# Patient Record
Sex: MALE | Race: WHITE | NOT HISPANIC OR LATINO | Employment: FULL TIME | URBAN - METROPOLITAN AREA
[De-identification: names, ages, dates, MRNs, and addresses within clinical notes are randomized per-mention and may not be internally consistent; named-entity substitution may affect disease eponyms.]

---

## 2017-05-08 ENCOUNTER — OFFICE VISIT (OUTPATIENT)
Dept: FAMILY MEDICINE CLINIC | Facility: CLINIC | Age: 47
End: 2017-05-08

## 2017-05-08 VITALS
OXYGEN SATURATION: 98 % | WEIGHT: 253 LBS | HEIGHT: 72 IN | BODY MASS INDEX: 34.27 KG/M2 | DIASTOLIC BLOOD PRESSURE: 88 MMHG | HEART RATE: 62 BPM | SYSTOLIC BLOOD PRESSURE: 138 MMHG

## 2017-05-08 DIAGNOSIS — Z00.00 ANNUAL PHYSICAL EXAM: Primary | ICD-10-CM

## 2017-05-08 DIAGNOSIS — M25.561 ACUTE PAIN OF BOTH KNEES: ICD-10-CM

## 2017-05-08 DIAGNOSIS — M25.562 ACUTE PAIN OF BOTH KNEES: ICD-10-CM

## 2017-05-08 DIAGNOSIS — IMO0001 ELEVATED BLOOD PRESSURE: ICD-10-CM

## 2017-05-08 LAB
BASOPHILS # BLD AUTO: 0.02 10*3/MM3 (ref 0–0.2)
BASOPHILS NFR BLD AUTO: 0.2 % (ref 0–1.5)
EOSINOPHIL # BLD AUTO: 0.08 10*3/MM3 (ref 0–0.7)
EOSINOPHIL NFR BLD AUTO: 1 % (ref 0.3–6.2)
ERYTHROCYTE [DISTWIDTH] IN BLOOD BY AUTOMATED COUNT: 15 % (ref 11.5–14.5)
HCT VFR BLD AUTO: 46.6 % (ref 40.4–52.2)
HGB BLD-MCNC: 14.4 G/DL (ref 13.7–17.6)
IMM GRANULOCYTES # BLD: 0.03 10*3/MM3 (ref 0–0.03)
IMM GRANULOCYTES NFR BLD: 0.4 % (ref 0–0.5)
LYMPHOCYTES # BLD AUTO: 1.98 10*3/MM3 (ref 0.9–4.8)
LYMPHOCYTES NFR BLD AUTO: 23.5 % (ref 19.6–45.3)
MCH RBC QN AUTO: 30.4 PG (ref 27–32.7)
MCHC RBC AUTO-ENTMCNC: 30.9 G/DL (ref 32.6–36.4)
MCV RBC AUTO: 98.3 FL (ref 79.8–96.2)
MONOCYTES # BLD AUTO: 0.55 10*3/MM3 (ref 0.2–1.2)
MONOCYTES NFR BLD AUTO: 6.5 % (ref 5–12)
NEUTROPHILS # BLD AUTO: 5.75 10*3/MM3 (ref 1.9–8.1)
NEUTROPHILS NFR BLD AUTO: 68.4 % (ref 42.7–76)
PLATELET # BLD AUTO: 185 10*3/MM3 (ref 140–500)
RBC # BLD AUTO: 4.74 10*6/MM3 (ref 4.6–6)
WBC # BLD AUTO: 8.41 10*3/MM3 (ref 4.5–10.7)

## 2017-05-08 PROCEDURE — 99212 OFFICE O/P EST SF 10 MIN: CPT | Performed by: NURSE PRACTITIONER

## 2017-05-08 PROCEDURE — 99386 PREV VISIT NEW AGE 40-64: CPT | Performed by: NURSE PRACTITIONER

## 2017-05-08 RX ORDER — MELOXICAM 15 MG/1
15 TABLET ORAL DAILY
COMMUNITY
Start: 2017-04-19 | End: 2017-11-16 | Stop reason: HOSPADM

## 2017-05-09 LAB
ALBUMIN SERPL-MCNC: 4.5 G/DL (ref 3.5–5.2)
ALBUMIN/GLOB SERPL: 2 G/DL
ALP SERPL-CCNC: 49 U/L (ref 39–117)
ALT SERPL-CCNC: 28 U/L (ref 1–41)
AST SERPL-CCNC: 17 U/L (ref 1–40)
BILIRUB SERPL-MCNC: 0.4 MG/DL (ref 0.1–1.2)
BUN SERPL-MCNC: 17 MG/DL (ref 6–20)
BUN/CREAT SERPL: 18.9 (ref 7–25)
CALCIUM SERPL-MCNC: 9.4 MG/DL (ref 8.6–10.5)
CHLORIDE SERPL-SCNC: 104 MMOL/L (ref 98–107)
CHOLEST SERPL-MCNC: 220 MG/DL (ref 0–200)
CHOLEST/HDLC SERPL: 4.23 {RATIO}
CO2 SERPL-SCNC: 24.8 MMOL/L (ref 22–29)
CREAT SERPL-MCNC: 0.9 MG/DL (ref 0.76–1.27)
GLOBULIN SER CALC-MCNC: 2.3 GM/DL
GLUCOSE SERPL-MCNC: 108 MG/DL (ref 65–99)
HBA1C MFR BLD: 5.58 % (ref 4.8–5.6)
HDLC SERPL-MCNC: 52 MG/DL (ref 40–60)
LDLC SERPL CALC-MCNC: 145 MG/DL (ref 0–100)
POTASSIUM SERPL-SCNC: 4.6 MMOL/L (ref 3.5–5.2)
PROT SERPL-MCNC: 6.8 G/DL (ref 6–8.5)
SODIUM SERPL-SCNC: 142 MMOL/L (ref 136–145)
TRIGL SERPL-MCNC: 116 MG/DL (ref 0–150)
VLDLC SERPL CALC-MCNC: 23.2 MG/DL (ref 5–40)

## 2017-07-14 ENCOUNTER — OFFICE VISIT (OUTPATIENT)
Dept: FAMILY MEDICINE CLINIC | Facility: CLINIC | Age: 47
End: 2017-07-14

## 2017-07-14 VITALS
OXYGEN SATURATION: 98 % | SYSTOLIC BLOOD PRESSURE: 142 MMHG | RESPIRATION RATE: 16 BRPM | HEART RATE: 64 BPM | DIASTOLIC BLOOD PRESSURE: 94 MMHG | HEIGHT: 72 IN | BODY MASS INDEX: 34.27 KG/M2 | WEIGHT: 253 LBS

## 2017-07-14 DIAGNOSIS — I10 ESSENTIAL HYPERTENSION: Primary | ICD-10-CM

## 2017-07-14 PROCEDURE — 99213 OFFICE O/P EST LOW 20 MIN: CPT | Performed by: NURSE PRACTITIONER

## 2017-07-14 RX ORDER — PREDNISONE 20 MG/1
20 TABLET ORAL DAILY
Qty: 16 TABLET | Refills: 0 | Status: SHIPPED | OUTPATIENT
Start: 2017-07-14 | End: 2017-08-11

## 2017-07-14 RX ORDER — LISINOPRIL 10 MG/1
10 TABLET ORAL DAILY
Qty: 30 TABLET | Refills: 1 | Status: SHIPPED | OUTPATIENT
Start: 2017-07-14 | End: 2017-08-24 | Stop reason: SDUPTHER

## 2017-07-14 NOTE — PROGRESS NOTES
"Urbano Mares is a 47 y.o. male.  Seen 07/14/2017    Assessment/Plan   Problem List Items Addressed This Visit     None             No Follow-up on file.  There are no Patient Instructions on file for this visit.    Subjective   Mr Mares  Is returning today for a BP check. He was seen for a physical on 5/8 and told to keep a BP diary and return for a recheck in 4 weeks. He has been keeping the diary and his readings have been elevated.  Chief Complaint   Patient presents with   • Poison Maria T     Social History   Substance Use Topics   • Smoking status: Current Every Day Smoker     Types: Cigars, Cigarettes   • Smokeless tobacco: Never Used   • Alcohol use 3.6 oz/week     6 Shots of liquor per week       History of Present Illness     The following portions of the patient's history were reviewed and updated as appropriate:PMHroutine: Social history , Past Medical History, Allergies, Current Medications and Active Problem List    Review of Systems   Constitutional: Negative for activity change and fatigue.   Cardiovascular: Negative for chest pain.   Neurological: Negative for dizziness and headaches.       Objective   Vitals:    07/14/17 0739   BP: 142/94   Pulse: 64   Resp: 16   SpO2: 98%   Weight: 253 lb (115 kg)   Height: 72\" (182.9 cm)     Body mass index is 34.31 kg/(m^2).  Physical Exam   Constitutional: He appears well-developed and well-nourished. No distress.   HENT:   Head: Normocephalic and atraumatic.   Eyes: EOM are normal.   Cardiovascular: Normal rate and regular rhythm.    Pulmonary/Chest: Effort normal and breath sounds normal.   Musculoskeletal: Normal range of motion.   Neurological: He is alert.   Skin: Skin is warm and dry.   Nursing note and vitals reviewed.    Reviewed Data:  No visits with results within 1 Month(s) from this visit.  Latest known visit with results is:    Office Visit on 05/08/2017   Component Date Value Ref Range Status   • Glucose 05/08/2017 108* 65 - 99 mg/dL Final   • BUN " 05/08/2017 17  6 - 20 mg/dL Final   • Creatinine 05/08/2017 0.90  0.76 - 1.27 mg/dL Final   • eGFR Non African Am 05/08/2017 90  >60 mL/min/1.73 Final   • eGFR African Am 05/08/2017 109  >60 mL/min/1.73 Final   • BUN/Creatinine Ratio 05/08/2017 18.9  7.0 - 25.0 Final   • Sodium 05/08/2017 142  136 - 145 mmol/L Final   • Potassium 05/08/2017 4.6  3.5 - 5.2 mmol/L Final   • Chloride 05/08/2017 104  98 - 107 mmol/L Final   • Total CO2 05/08/2017 24.8  22.0 - 29.0 mmol/L Final   • Calcium 05/08/2017 9.4  8.6 - 10.5 mg/dL Final   • Total Protein 05/08/2017 6.8  6.0 - 8.5 g/dL Final   • Albumin 05/08/2017 4.50  3.50 - 5.20 g/dL Final   • Globulin 05/08/2017 2.3  gm/dL Final   • A/G Ratio 05/08/2017 2.0  g/dL Final   • Total Bilirubin 05/08/2017 0.4  0.1 - 1.2 mg/dL Final   • Alkaline Phosphatase 05/08/2017 49  39 - 117 U/L Final   • AST (SGOT) 05/08/2017 17  1 - 40 U/L Final   • ALT (SGPT) 05/08/2017 28  1 - 41 U/L Final   • Total Cholesterol 05/08/2017 220* 0 - 200 mg/dL Final   • Triglycerides 05/08/2017 116  0 - 150 mg/dL Final   • HDL Cholesterol 05/08/2017 52  40 - 60 mg/dL Final   • VLDL Cholesterol 05/08/2017 23.2  5 - 40 mg/dL Final   • LDL Cholesterol  05/08/2017 145* 0 - 100 mg/dL Final   • Chol/HDL Ratio 05/08/2017 4.23   Final   • Hemoglobin A1C 05/08/2017 5.58  4.80 - 5.60 % Final    Comment: Hemoglobin A1C Ranges:  Increased Risk for Diabetes  5.7% to 6.4%  Diabetes                     >= 6.5%  Diabetic Goal                < 7.0%     • WBC 05/08/2017 8.41  4.50 - 10.70 10*3/mm3 Final   • RBC 05/08/2017 4.74  4.60 - 6.00 10*6/mm3 Final   • Hemoglobin 05/08/2017 14.4  13.7 - 17.6 g/dL Final   • Hematocrit 05/08/2017 46.6  40.4 - 52.2 % Final   • MCV 05/08/2017 98.3* 79.8 - 96.2 fL Final   • MCH 05/08/2017 30.4  27.0 - 32.7 pg Final   • MCHC 05/08/2017 30.9* 32.6 - 36.4 g/dL Final   • RDW 05/08/2017 15.0* 11.5 - 14.5 % Final   • Platelets 05/08/2017 185  140 - 500 10*3/mm3 Final   • Neutrophil Rel % 05/08/2017  68.4  42.7 - 76.0 % Final   • Lymphocyte Rel % 05/08/2017 23.5  19.6 - 45.3 % Final   • Monocyte Rel % 05/08/2017 6.5  5.0 - 12.0 % Final   • Eosinophil Rel % 05/08/2017 1.0  0.3 - 6.2 % Final   • Basophil Rel % 05/08/2017 0.2  0.0 - 1.5 % Final   • Neutrophils Absolute 05/08/2017 5.75  1.90 - 8.10 10*3/mm3 Final   • Lymphocytes Absolute 05/08/2017 1.98  0.90 - 4.80 10*3/mm3 Final   • Monocytes Absolute 05/08/2017 0.55  0.20 - 1.20 10*3/mm3 Final   • Eosinophils Absolute 05/08/2017 0.08  0.00 - 0.70 10*3/mm3 Final   • Basophils Absolute 05/08/2017 0.02  0.00 - 0.20 10*3/mm3 Final   • Immature Granulocyte Rel % 05/08/2017 0.4  0.0 - 0.5 % Final   • Immature Grans Absolute 05/08/2017 0.03  0.00 - 0.03 10*3/mm3 Final       Going to try a trial of lisinopril and follow up in 6 weeks with a BP diary.

## 2017-08-11 ENCOUNTER — OFFICE VISIT (OUTPATIENT)
Dept: FAMILY MEDICINE CLINIC | Facility: CLINIC | Age: 47
End: 2017-08-11

## 2017-08-11 VITALS
BODY MASS INDEX: 34.13 KG/M2 | WEIGHT: 252 LBS | HEART RATE: 106 BPM | HEIGHT: 72 IN | DIASTOLIC BLOOD PRESSURE: 96 MMHG | OXYGEN SATURATION: 98 % | SYSTOLIC BLOOD PRESSURE: 132 MMHG

## 2017-08-11 DIAGNOSIS — L23.7 POISON IVY DERMATITIS: Primary | ICD-10-CM

## 2017-08-11 PROCEDURE — 99213 OFFICE O/P EST LOW 20 MIN: CPT | Performed by: NURSE PRACTITIONER

## 2017-08-11 PROCEDURE — 96372 THER/PROPH/DIAG INJ SC/IM: CPT | Performed by: NURSE PRACTITIONER

## 2017-08-11 RX ORDER — TRIAMCINOLONE ACETONIDE 40 MG/ML
40 INJECTION, SUSPENSION INTRA-ARTICULAR; INTRAMUSCULAR ONCE
Status: COMPLETED | OUTPATIENT
Start: 2017-08-11 | End: 2017-08-11

## 2017-08-11 RX ORDER — TRIAMCINOLONE ACETONIDE 40 MG/ML
20 INJECTION, SUSPENSION INTRA-ARTICULAR; INTRAMUSCULAR ONCE
Status: COMPLETED | OUTPATIENT
Start: 2017-08-11 | End: 2017-08-11

## 2017-08-11 RX ADMIN — TRIAMCINOLONE ACETONIDE 20 MG: 40 INJECTION, SUSPENSION INTRA-ARTICULAR; INTRAMUSCULAR at 13:16

## 2017-08-11 RX ADMIN — TRIAMCINOLONE ACETONIDE 40 MG: 40 INJECTION, SUSPENSION INTRA-ARTICULAR; INTRAMUSCULAR at 13:17

## 2017-08-11 NOTE — PATIENT INSTRUCTIONS
Continue 20mg of lisinopril  Poison Ivy Dermatitis  Poison ivy dermatitis is inflammation of the skin that is caused by the allergens on the leaves of the poison ivy plant. The skin reaction often involves redness, swelling, blisters, and extreme itching.  CAUSES  This condition is caused by a specific chemical (urushiol) found in the sap of the poison ivy plant. This chemical is sticky and can be easily spread to people, animals, and objects. You can get poison ivy dermatitis by:  · Having direct contact with a poison ivy plant.  · Touching animals, other people, or objects that have come in contact with poison ivy and have the chemical on them.  RISK FACTORS  This condition is more likely to develop in:  · People who are outdoors often.  · People who go outdoors without wearing protective clothing, such as closed shoes, long pants, and a long-sleeved shirt.  SYMPTOMS  Symptoms of this condition include:  · Redness and itching.  · A rash that often includes bumps and blisters. The rash usually appears 48 hours after exposure.  · Swelling. This may occur if the reaction is more severe.  Symptoms usually last for 1-2 weeks. However, the first time you develop this condition, symptoms may last 3-4 weeks.  DIAGNOSIS  This condition may be diagnosed based on your symptoms and a physical exam. Your health care provider may also ask you about any recent outdoor activity.  TREATMENT  Treatment for this condition will vary depending on how severe it is. Treatment may include:  · Hydrocortisone creams or calamine lotions to relieve itching.  · Oatmeal baths to soothe the skin.  · Over-the-counter antihistamine tablets.  · Oral steroid medicine for more severe outbreaks.  HOME CARE INSTRUCTIONS  · Take or apply over-the-counter and prescription medicines only as told by your health care provider.  · Wash exposed skin as soon as possible with soap and cold water.  · Use hydrocortisone creams or calamine lotion as needed to  soothe the skin and relieve itching.  · Take oatmeal baths as needed. Use colloidal oatmeal. You can get this at your local pharmacy or grocery store. Follow the instructions on the packaging.  · Do not scratch or rub your skin.  · While you have the rash, wash clothes right after you wear them.  PREVENTION  · Learn to identify the poison ivy plant and avoid contact with the plant. This plant can be recognized by the number of leaves. Generally, poison ivy has three leaves with flowering branches on a single stem. The leaves are typically glossy, and they have jagged edges that come to a point at the front.  · If you have been exposed to poison ivy, thoroughly wash with soap and water right away. You have about 30 minutes to remove the plant resin before it will cause the rash. Be sure to wash under your fingernails because any plant resin there will continue to spread the rash.  · When hiking or camping, wear clothes that will help you to avoid exposure on the skin. This includes long pants, a long-sleeved shirt, tall socks, and hiking boots. You can also apply preventive lotion to your skin to help limit exposure.  · If you suspect that your clothes or outdoor gear came in contact with poison ivy, rinse them off outside with a garden hose before you bring them inside your house.  SEEK MEDICAL CARE IF:  · You have open sores in the rash area.  · You have more redness, swelling, or pain in the affected area.  · You have redness that spreads beyond the rash area.  · You have fluid, blood, or pus coming from the affected area.  · You have a fever.  · You have a rash over a large area of your body.  · You have a rash on your eyes, mouth, or genitals.  · Your rash does not improve after a few days.  SEEK IMMEDIATE MEDICAL CARE IF:  · Your face swells or your eyes swell shut.    · You have trouble breathing.    · You have trouble swallowing.       This information is not intended to replace advice given to you by your  health care provider. Make sure you discuss any questions you have with your health care provider.     Document Released: 12/15/2001 Document Revised: 04/10/2017 Document Reviewed: 05/25/2016  [employer] Interactive Patient Education ©2017 [employer]

## 2017-08-11 NOTE — PROGRESS NOTES
Urbano Mares is a 47 y.o. male.Patient presents with left leg rash that has been present for about 10 days. Rash started on left leg and has moved to his abdomen. Using Calamine lotion and Benadryl. Pt [place] h[place] allergic dermatitis [place]     Secondly patient was placed on Lisinopril 10 mg one month ago. His blood pressure has been running about 144/91. He was advised by Belem Hernandez to double the medication if it remained high, he took two this morning.  Seen 08/11/2017    Assessment/Plan   Problem List Items Addressed This Visit     None      Visit Diagnoses     Poison ivy dermatitis    -  Primary    Relevant Medications    betamethasone valerate (VALISONE) 0.1 % ointment    triamcinolone acetonide (KENALOG-40) injection 20 mg (Completed) (Start on 8/11/2017  2:00 PM)    triamcinolone acetonide (KENALOG-40) injection 40 mg (Completed) (Start on 8/11/2017  2:00 PM)             Return in about 4 weeks (around 9/8/2017), or bp with belem, for Recheck.  Patient Instructions   Continue 20mg of lisinopril  Poison Ivy Dermatitis  Poison ivy dermatitis is inflammation of the skin that is caused by the allergens on the leaves of the poison ivy plant. The skin reaction often involves redness, swelling, blisters, and extreme itching.  CAUSES  This condition is caused by a specific chemical (urushiol) found in the sap of the poison ivy plant. This chemical is sticky and can be easily spread to people, animals, and objects. You can get poison ivy dermatitis by:  · Having direct contact with a poison ivy plant.  · Touching animals, other people, or objects that have come in contact with poison ivy and have the chemical on them.  RISK FACTORS  This condition is more likely to develop in:  · People who are outdoors often.  · People who go outdoors without wearing protective clothing, such as closed shoes, long pants, and a long-sleeved shirt.  SYMPTOMS  Symptoms of this condition  include:  · Redness and itching.  · A rash that often includes bumps and blisters. The rash usually appears 48 hours after exposure.  · Swelling. This may occur if the reaction is more severe.  Symptoms usually last for 1-2 weeks. However, the first time you develop this condition, symptoms may last 3-4 weeks.  DIAGNOSIS  This condition may be diagnosed based on your symptoms and a physical exam. Your health care provider may also ask you about any recent outdoor activity.  TREATMENT  Treatment for this condition will vary depending on how severe it is. Treatment may include:  · Hydrocortisone creams or calamine lotions to relieve itching.  · Oatmeal baths to soothe the skin.  · Over-the-counter antihistamine tablets.  · Oral steroid medicine for more severe outbreaks.  HOME CARE INSTRUCTIONS  · Take or apply over-the-counter and prescription medicines only as told by your health care provider.  · Wash exposed skin as soon as possible with soap and cold water.  · Use hydrocortisone creams or calamine lotion as needed to soothe the skin and relieve itching.  · Take oatmeal baths as needed. Use colloidal oatmeal. You can get this at your local pharmacy or grocery store. Follow the instructions on the packaging.  · Do not scratch or rub your skin.  · While you have the rash, wash clothes right after you wear them.  PREVENTION  · Learn to identify the poison ivy plant and avoid contact with the plant. This plant can be recognized by the number of leaves. Generally, poison ivy has three leaves with flowering branches on a single stem. The leaves are typically glossy, and they have jagged edges that come to a point at the front.  · If you have been exposed to poison ivy, thoroughly wash with soap and water right away. You have about 30 minutes to remove the plant resin before it will cause the rash. Be sure to wash under your fingernails because any plant resin there will continue to spread the rash.  · When hiking or  camping, wear clothes that will help you to avoid exposure on the skin. This includes long pants, a long-sleeved shirt, tall socks, and hiking boots. You can also apply preventive lotion to your skin to help limit exposure.  · If you suspect that your clothes or outdoor gear came in contact with poison ivy, rinse them off outside with a garden hose before you bring them inside your house.  SEEK MEDICAL CARE IF:  · You have open sores in the rash area.  · You have more redness, swelling, or pain in the affected area.  · You have redness that spreads beyond the rash area.  · You have fluid, blood, or pus coming from the affected area.  · You have a fever.  · You have a rash over a large area of your body.  · You have a rash on your eyes, mouth, or genitals.  · Your rash does not improve after a few days.  SEEK IMMEDIATE MEDICAL CARE IF:  · Your face swells or your eyes swell shut.    · You have trouble breathing.    · You have trouble swallowing.       This information is not intended to replace advice given to you by your health care provider. Make sure you discuss any questions you have with your health care provider.     Document Released: 12/15/2001 Document Revised: 04/10/2017 Document Reviewed: 05/25/2016  [employer] Interactive Patient Education ©2017 [employer]        Subjective     Chief Complaint   Patient presents with   • Rash   • Hypertension     Social History   Substance Use Topics   • Smoking status: Current Every Day Smoker     Types: Cigars, Cigarettes   • Smokeless tobacco: Never Used   • Alcohol use 3.6 oz/week     6 Shots of liquor per week       History of Present Illness     The following portions of the patient's history were reviewed and updated as appropriate:PMHroutine: Social history , Allergies, Current Medications, Active Problem List and Health Maintenance    Review of Systems   Skin: Positive for rash.       Objective   Vitals:    08/11/17 1253   BP: 132/96   Pulse: 106   SpO2: 98%  "  Weight: 252 lb (114 kg)   Height: 72\" (182.9 cm)     Body mass index is 34.18 kg/(m^2).  Physical Exam   Constitutional: He is oriented to person, place, and time. He appears well-developed and well-nourished.   HENT:   Head: Normocephalic.   Neck: Normal range of motion.   Cardiovascular: Normal rate.    Pulmonary/Chest: Effort normal.   Abdominal: Soft.   Musculoskeletal: Normal range of motion.   Neurological: He is alert and oriented to person, place, and time.   Skin: Skin is warm and dry. Rash noted. Rash is maculopapular and urticarial. There is erythema.   Maculopapular erythematous linear rash to both lower extremities   Psychiatric: He has a normal mood and affect.   Nursing note and vitals reviewed.    Reviewed Data:  No visits with results within 1 Month(s) from this visit.  Latest known visit with results is:    Office Visit on 05/08/2017   Component Date Value Ref Range Status   • Glucose 05/08/2017 108* 65 - 99 mg/dL Final   • BUN 05/08/2017 17  6 - 20 mg/dL Final   • Creatinine 05/08/2017 0.90  0.76 - 1.27 mg/dL Final   • eGFR Non African Am 05/08/2017 90  >60 mL/min/1.73 Final   • eGFR African Am 05/08/2017 109  >60 mL/min/1.73 Final   • BUN/Creatinine Ratio 05/08/2017 18.9  7.0 - 25.0 Final   • Sodium 05/08/2017 142  136 - 145 mmol/L Final   • Potassium 05/08/2017 4.6  3.5 - 5.2 mmol/L Final   • Chloride 05/08/2017 104  98 - 107 mmol/L Final   • Total CO2 05/08/2017 24.8  22.0 - 29.0 mmol/L Final   • Calcium 05/08/2017 9.4  8.6 - 10.5 mg/dL Final   • Total Protein 05/08/2017 6.8  6.0 - 8.5 g/dL Final   • Albumin 05/08/2017 4.50  3.50 - 5.20 g/dL Final   • Globulin 05/08/2017 2.3  gm/dL Final   • A/G Ratio 05/08/2017 2.0  g/dL Final   • Total Bilirubin 05/08/2017 0.4  0.1 - 1.2 mg/dL Final   • Alkaline Phosphatase 05/08/2017 49  39 - 117 U/L Final   • AST (SGOT) 05/08/2017 17  1 - 40 U/L Final   • ALT (SGPT) 05/08/2017 28  1 - 41 U/L Final   • Total Cholesterol 05/08/2017 220* 0 - 200 mg/dL Final "   • Triglycerides 05/08/2017 116  0 - 150 mg/dL Final   • HDL Cholesterol 05/08/2017 52  40 - 60 mg/dL Final   • VLDL Cholesterol 05/08/2017 23.2  5 - 40 mg/dL Final   • LDL Cholesterol  05/08/2017 145* 0 - 100 mg/dL Final   • Chol/HDL Ratio 05/08/2017 4.23   Final   • Hemoglobin A1C 05/08/2017 5.58  4.80 - 5.60 % Final    Comment: Hemoglobin A1C Ranges:  Increased Risk for Diabetes  5.7% to 6.4%  Diabetes                     >= 6.5%  Diabetic Goal                < 7.0%     • WBC 05/08/2017 8.41  4.50 - 10.70 10*3/mm3 Final   • RBC 05/08/2017 4.74  4.60 - 6.00 10*6/mm3 Final   • Hemoglobin 05/08/2017 14.4  13.7 - 17.6 g/dL Final   • Hematocrit 05/08/2017 46.6  40.4 - 52.2 % Final   • MCV 05/08/2017 98.3* 79.8 - 96.2 fL Final   • MCH 05/08/2017 30.4  27.0 - 32.7 pg Final   • MCHC 05/08/2017 30.9* 32.6 - 36.4 g/dL Final   • RDW 05/08/2017 15.0* 11.5 - 14.5 % Final   • Platelets 05/08/2017 185  140 - 500 10*3/mm3 Final   • Neutrophil Rel % 05/08/2017 68.4  42.7 - 76.0 % Final   • Lymphocyte Rel % 05/08/2017 23.5  19.6 - 45.3 % Final   • Monocyte Rel % 05/08/2017 6.5  5.0 - 12.0 % Final   • Eosinophil Rel % 05/08/2017 1.0  0.3 - 6.2 % Final   • Basophil Rel % 05/08/2017 0.2  0.0 - 1.5 % Final   • Neutrophils Absolute 05/08/2017 5.75  1.90 - 8.10 10*3/mm3 Final   • Lymphocytes Absolute 05/08/2017 1.98  0.90 - 4.80 10*3/mm3 Final   • Monocytes Absolute 05/08/2017 0.55  0.20 - 1.20 10*3/mm3 Final   • Eosinophils Absolute 05/08/2017 0.08  0.00 - 0.70 10*3/mm3 Final   • Basophils Absolute 05/08/2017 0.02  0.00 - 0.20 10*3/mm3 Final   • Immature Granulocyte Rel % 05/08/2017 0.4  0.0 - 0.5 % Final   • Immature Grans Absolute 05/08/2017 0.03  0.00 - 0.03 10*3/mm3 Final

## 2017-08-23 ENCOUNTER — TELEPHONE (OUTPATIENT)
Dept: FAMILY MEDICINE CLINIC | Facility: CLINIC | Age: 47
End: 2017-08-23

## 2017-08-23 NOTE — TELEPHONE ENCOUNTER
----- Message from JOHN Hummel sent at 8/23/2017  2:05 PM EDT -----  Contact: [phone]   Maricel can you call Mr Mares and tell him to take 2 tablet daily for 2 weeks, keep the BP diary and return to office.  ----- Message -----     From: Mei Rees     Sent: 8/23/2017   9:27 AM       To: JOHN Hummel    Patient called regarding blood pressure medication.  One pill per day made no difference, two per day-still no improvement.  He has increased to three a day and is noticing some improvement.  Wants to clarify with you that three a day is ok or if you want to try a different medication.  He will need refill if continuing on current medication    Please call

## 2017-08-24 RX ORDER — LISINOPRIL 10 MG/1
20 TABLET ORAL DAILY
Qty: 60 TABLET | Refills: 1 | Status: SHIPPED | OUTPATIENT
Start: 2017-08-24 | End: 2017-08-25 | Stop reason: SDUPTHER

## 2017-08-24 NOTE — TELEPHONE ENCOUNTER
Called and informed pt he verbalized understanding.  He states he is out of medication. Will call pharmacy and have m  edication refilled.     Called pharmacy medication is filled as requested. Called and informed pt that medication would be ready for  at his convenience.

## 2017-08-25 RX ORDER — LISINOPRIL 10 MG/1
20 TABLET ORAL DAILY
Qty: 60 TABLET | Refills: 1 | Status: SHIPPED | OUTPATIENT
Start: 2017-08-25 | End: 2017-09-08 | Stop reason: SDUPTHER

## 2017-09-08 ENCOUNTER — OFFICE VISIT (OUTPATIENT)
Dept: FAMILY MEDICINE CLINIC | Facility: CLINIC | Age: 47
End: 2017-09-08

## 2017-09-08 VITALS
WEIGHT: 254 LBS | DIASTOLIC BLOOD PRESSURE: 80 MMHG | OXYGEN SATURATION: 96 % | HEIGHT: 72 IN | BODY MASS INDEX: 34.4 KG/M2 | HEART RATE: 92 BPM | SYSTOLIC BLOOD PRESSURE: 130 MMHG

## 2017-09-08 DIAGNOSIS — I10 ESSENTIAL HYPERTENSION: Primary | ICD-10-CM

## 2017-09-08 DIAGNOSIS — M25.561 ACUTE PAIN OF BOTH KNEES: ICD-10-CM

## 2017-09-08 DIAGNOSIS — M25.562 ACUTE PAIN OF BOTH KNEES: ICD-10-CM

## 2017-09-08 PROCEDURE — 99213 OFFICE O/P EST LOW 20 MIN: CPT | Performed by: NURSE PRACTITIONER

## 2017-09-08 RX ORDER — NAPROXEN 250 MG/1
250 TABLET ORAL 2 TIMES DAILY PRN
COMMUNITY
End: 2017-11-16 | Stop reason: HOSPADM

## 2017-09-08 RX ORDER — IBUPROFEN 200 MG
200 TABLET ORAL EVERY 6 HOURS PRN
COMMUNITY
End: 2017-11-16 | Stop reason: HOSPADM

## 2017-09-08 RX ORDER — LISINOPRIL 10 MG/1
20 TABLET ORAL DAILY
Qty: 90 TABLET | Refills: 1 | Status: SHIPPED | OUTPATIENT
Start: 2017-09-08 | End: 2017-10-06 | Stop reason: SDUPTHER

## 2017-09-08 RX ORDER — ACETAMINOPHEN 500 MG
500 TABLET ORAL EVERY 6 HOURS PRN
COMMUNITY
End: 2017-11-16 | Stop reason: HOSPADM

## 2017-09-08 NOTE — PATIENT INSTRUCTIONS

## 2017-09-08 NOTE — PROGRESS NOTES
"Urbano Mares is a 47 y.o. male.Patient presents for a blood pressure check. He has been monitoring his blood pressure at home. Readings are leveling out in the afternoon.  Also is having bilateral knee pain that he has had for a long time. He has been using ibuprofen and meloxicam but not at the same time. Is having a very hard time when he walks up hills.  Seen 09/08/2017    Assessment/Plan   Problem List Items Addressed This Visit     None      Visit Diagnoses     Essential hypertension    -  Primary    Relevant Medications    lisinopril (PRINIVIL,ZESTRIL) 10 MG tablet    Acute pain of both knees        Relevant Medications    diclofenac (VOLTAREN) 1 % gel gel    Other Relevant Orders    Ambulatory Referral to Orthopedic Surgery             No Follow-up on file.  There are no Patient Instructions on file for this visit.    Subjective     No chief complaint on file.    Social History   Substance Use Topics   • Smoking status: Current Every Day Smoker     Types: Cigars, Cigarettes   • Smokeless tobacco: Never Used   • Alcohol use 3.6 oz/week     6 Shots of liquor per week       History of Present Illness     The following portions of the patient's history were reviewed and updated as appropriate:PMHroutine: Social history , Allergies, Current Medications and Active Problem List    Review of Systems   Cardiovascular: Positive for palpitations. Negative for chest pain and leg swelling.   Musculoskeletal: Positive for gait problem and myalgias.   Neurological: Negative for dizziness and headaches.   Psychiatric/Behavioral: Negative for agitation.       Objective   Vitals:    09/08/17 1410   BP: 130/80   Pulse: 92   SpO2: 96%   Weight: 254 lb (115 kg)   Height: 72\" (182.9 cm)     Body mass index is 34.45 kg/(m^2).  Physical Exam   Constitutional: He appears well-developed and well-nourished. No distress.   HENT:   Head: Normocephalic.   Right Ear: External ear normal.   Left Ear: External ear normal.   Eyes: EOM are " normal. Pupils are equal, round, and reactive to light.   Cardiovascular: Normal rate and regular rhythm.    Musculoskeletal: Normal range of motion.   Pain with flexion to lateral aspect of knees, bilaterally.   Neurological: He is alert.   Skin: Skin is warm.   Nursing note and vitals reviewed.    Reviewed Data:  No visits with results within 1 Month(s) from this visit.  Latest known visit with results is:    Office Visit on 05/08/2017   Component Date Value Ref Range Status   • Glucose 05/08/2017 108* 65 - 99 mg/dL Final   • BUN 05/08/2017 17  6 - 20 mg/dL Final   • Creatinine 05/08/2017 0.90  0.76 - 1.27 mg/dL Final   • eGFR Non African Am 05/08/2017 90  >60 mL/min/1.73 Final   • eGFR African Am 05/08/2017 109  >60 mL/min/1.73 Final   • BUN/Creatinine Ratio 05/08/2017 18.9  7.0 - 25.0 Final   • Sodium 05/08/2017 142  136 - 145 mmol/L Final   • Potassium 05/08/2017 4.6  3.5 - 5.2 mmol/L Final   • Chloride 05/08/2017 104  98 - 107 mmol/L Final   • Total CO2 05/08/2017 24.8  22.0 - 29.0 mmol/L Final   • Calcium 05/08/2017 9.4  8.6 - 10.5 mg/dL Final   • Total Protein 05/08/2017 6.8  6.0 - 8.5 g/dL Final   • Albumin 05/08/2017 4.50  3.50 - 5.20 g/dL Final   • Globulin 05/08/2017 2.3  gm/dL Final   • A/G Ratio 05/08/2017 2.0  g/dL Final   • Total Bilirubin 05/08/2017 0.4  0.1 - 1.2 mg/dL Final   • Alkaline Phosphatase 05/08/2017 49  39 - 117 U/L Final   • AST (SGOT) 05/08/2017 17  1 - 40 U/L Final   • ALT (SGPT) 05/08/2017 28  1 - 41 U/L Final   • Total Cholesterol 05/08/2017 220* 0 - 200 mg/dL Final   • Triglycerides 05/08/2017 116  0 - 150 mg/dL Final   • HDL Cholesterol 05/08/2017 52  40 - 60 mg/dL Final   • VLDL Cholesterol 05/08/2017 23.2  5 - 40 mg/dL Final   • LDL Cholesterol  05/08/2017 145* 0 - 100 mg/dL Final   • Chol/HDL Ratio 05/08/2017 4.23   Final   • Hemoglobin A1C 05/08/2017 5.58  4.80 - 5.60 % Final    Comment: Hemoglobin A1C Ranges:  Increased Risk for Diabetes  5.7% to 6.4%  Diabetes                      >= 6.5%  Diabetic Goal                < 7.0%     • WBC 05/08/2017 8.41  4.50 - 10.70 10*3/mm3 Final   • RBC 05/08/2017 4.74  4.60 - 6.00 10*6/mm3 Final   • Hemoglobin 05/08/2017 14.4  13.7 - 17.6 g/dL Final   • Hematocrit 05/08/2017 46.6  40.4 - 52.2 % Final   • MCV 05/08/2017 98.3* 79.8 - 96.2 fL Final   • MCH 05/08/2017 30.4  27.0 - 32.7 pg Final   • MCHC 05/08/2017 30.9* 32.6 - 36.4 g/dL Final   • RDW 05/08/2017 15.0* 11.5 - 14.5 % Final   • Platelets 05/08/2017 185  140 - 500 10*3/mm3 Final   • Neutrophil Rel % 05/08/2017 68.4  42.7 - 76.0 % Final   • Lymphocyte Rel % 05/08/2017 23.5  19.6 - 45.3 % Final   • Monocyte Rel % 05/08/2017 6.5  5.0 - 12.0 % Final   • Eosinophil Rel % 05/08/2017 1.0  0.3 - 6.2 % Final   • Basophil Rel % 05/08/2017 0.2  0.0 - 1.5 % Final   • Neutrophils Absolute 05/08/2017 5.75  1.90 - 8.10 10*3/mm3 Final   • Lymphocytes Absolute 05/08/2017 1.98  0.90 - 4.80 10*3/mm3 Final   • Monocytes Absolute 05/08/2017 0.55  0.20 - 1.20 10*3/mm3 Final   • Eosinophils Absolute 05/08/2017 0.08  0.00 - 0.70 10*3/mm3 Final   • Basophils Absolute 05/08/2017 0.02  0.00 - 0.20 10*3/mm3 Final   • Immature Granulocyte Rel % 05/08/2017 0.4  0.0 - 0.5 % Final   • Immature Grans Absolute 05/08/2017 0.03  0.00 - 0.03 10*3/mm3 Final     Reviewed his BP readings, suggested taking the medicine at night and continue the BP diary.  Referral to ortho,Dr Lujan, for evaluation.

## 2017-10-06 ENCOUNTER — TELEPHONE (OUTPATIENT)
Dept: FAMILY MEDICINE CLINIC | Facility: CLINIC | Age: 47
End: 2017-10-06

## 2017-10-06 RX ORDER — LISINOPRIL 10 MG/1
20 TABLET ORAL DAILY
Qty: 90 TABLET | Refills: 1 | Status: SHIPPED | OUTPATIENT
Start: 2017-10-06 | End: 2018-02-27 | Stop reason: SDUPTHER

## 2017-10-06 NOTE — TELEPHONE ENCOUNTER
----- Message from Mei Rees sent at 10/6/2017 10:35 AM EDT -----  Needs prescription for lisinopril (PRINIVIL,ZESTRIL) 20 MG tablet sent to [facility].

## 2017-10-13 ENCOUNTER — OFFICE VISIT (OUTPATIENT)
Dept: ORTHOPEDIC SURGERY | Facility: CLINIC | Age: 47
End: 2017-10-13

## 2017-10-13 VITALS — BODY MASS INDEX: 33.59 KG/M2 | WEIGHT: 248 LBS | TEMPERATURE: 97.9 F | HEIGHT: 72 IN

## 2017-10-13 DIAGNOSIS — M17.12 ARTHRITIS OF LEFT KNEE: ICD-10-CM

## 2017-10-13 DIAGNOSIS — G89.29 CHRONIC PAIN OF BOTH KNEES: Primary | ICD-10-CM

## 2017-10-13 DIAGNOSIS — M17.11 ARTHRITIS OF RIGHT KNEE: ICD-10-CM

## 2017-10-13 DIAGNOSIS — M25.562 CHRONIC PAIN OF BOTH KNEES: Primary | ICD-10-CM

## 2017-10-13 DIAGNOSIS — M25.561 CHRONIC PAIN OF BOTH KNEES: Primary | ICD-10-CM

## 2017-10-13 PROCEDURE — 73562 X-RAY EXAM OF KNEE 3: CPT | Performed by: ORTHOPAEDIC SURGERY

## 2017-10-13 PROCEDURE — 99204 OFFICE O/P NEW MOD 45 MIN: CPT | Performed by: ORTHOPAEDIC SURGERY

## 2017-10-13 NOTE — PROGRESS NOTES
Patient Name: Urbano Mares   YOB: 1970  Referring Primary Care Physician: JOHN Iglesias      Chief Complaint:    Chief Complaint   Patient presents with   • Left Knee - Establish Care   • Right Knee - Establish Care        Subjective:    HPI:   Urbano Mares is a pleasant 47 y.o. 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  He saw dr wren who did cortisone and eufflexa that didn't help much.  Has been on nsaids, lost 12 lbs, uses exercise bike, ice and healt and has pain and stifness that ae increasing and preventing him from doing adls.  Referred by friend    This problem is  new to this examiner.     Medications:   Home Medications:  Current Outpatient Prescriptions on File Prior to Visit   Medication Sig   • acetaminophen (TYLENOL) 500 MG tablet Take 500 mg by mouth Every 6 (Six) Hours As Needed for Mild Pain .   • lisinopril (PRINIVIL,ZESTRIL) 10 MG tablet Take 2 tablets by mouth Daily.   • meloxicam (MOBIC) 15 MG tablet Take 15 mg by mouth Daily.   • naproxen (NAPROSYN) 250 MG tablet Take 250 mg by mouth 2 (Two) Times a Day As Needed.   • ibuprofen (ADVIL,MOTRIN) 200 MG tablet Take 200 mg by mouth Every 6 (Six) Hours As Needed for Mild Pain .   • [DISCONTINUED] betamethasone valerate (VALISONE) 0.1 % ointment Apply  topically 2 (Two) Times a Day.   • [DISCONTINUED] diclofenac (VOLTAREN) 1 % gel gel Apply 4 g topically 4 (Four) Times a Day As Needed (pain).     No current facility-administered medications on file prior to visit.      Current Medications:  Scheduled Meds:  Continuous Infusions:  No current facility-administered medications for this visit.   PRN Meds:.    I have reviewed the patient's medical history in detail and updated the computerized patient record.  Review and  summarization of old records includes:    Past Medical History:   Diagnosis Date   • Joint pain    • Pre-hypertension       History reviewed. No pertinent surgical history.     Social History     Occupational History   • Not on file.     Social History Main Topics   • Smoking status: Current Some Day Smoker     Types: Cigars   • Smokeless tobacco: Never Used   • Alcohol use 3.6 oz/week     6 Shots of liquor per week   • Drug use: No   • Sexual activity: Defer    Social History     Social History Narrative        Family History   Problem Relation Age of Onset   • Cancer Mother    • Cancer Father      Prostate, Bladder, Brain   • Hypertension Sister    • Hypertension Brother        ROS: 14 point review of systems was performed and all other systems were reviewed and are negative except for documented findings in HPI and today's encounter.     Allergies: No Known Allergies  Constitutional:  Denies fever, shaking or chills   Eyes:  Denies change in visual acuity   HENT:  Denies nasal congestion or sore throat   Respiratory:  Denies cough or shortness of breath   Cardiovascular:  Denies chest pain or severe LE edema   GI:  Denies abdominal pain, nausea, vomiting, bloody stools or diarrhea   Musculoskeletal:  Numbness, tingling, pain, or loss of motor function only as noted above in history of present illness.  : Denies painful urination or hematuria  Integument:  Denies rash, lesion or ulceration   Neurologic:  Denies headache or focal weakness  Endocrine:  Denies lymphadenopathy  Psych:  Denies confusion or change in mental status   Hem:  Denies active bleeding    Objective:    Physical Exam: 47 y.o. male  Body mass index is 33.63 kg/(m^2)., @WT@, @HT@  Vitals:    10/13/17 1000   Temp: 97.9 °F (36.6 °C)     Vital signs reviewed.   General Appearance:    Alert, cooperative, in no acute distress                  Eyes: conjunctiva clear  ENT: external ears and nose atraumatic  CV: no peripheral edema  Resp: normal  respiratory effort  Skin: no rashes or wounds; normal turgor  Psych: mood and affect appropriate  Lymph: no nodes appreciated  Neuro: gross sensation intact  Vascular:  Palpable peripheral pulse in noted extremity  Musculoskeletal Extremities: KNEE Exam: medial joint line tenderness with crepitation, synovitis, swelling, and joint effusion bilateral knee.    Radiology:   Imaging done today and discussed at length with the patient:    Indication: pain related symptoms,  Views: 3V AP, LAT & 40 degree PA bilateral knee(s)   Findings: severe end-stage arthritis (bone on bone, subchondral sclerosis/cysts, osteophytes)  Comparison views: not available      Assessment:     ICD-10-CM ICD-9-CM   1. Chronic pain of both knees M25.561 719.46    M25.562 338.29    G89.29    2. Arthritis of left knee M17.12 716.96   3. Arthritis of right knee M17.11 716.96        Procedures       Plan: Biomechanics of pertinent body area discussed.  Risks, benefits, alternatives, comparisons, and complications of accepted medicines, injections, recommendations, surgical procedures, and therapies explained and education provided in laymen's terms. The patient was given the opportunity to ask questions and they were answerved to their satisfaction.   Natural history and expected course of this patient's diagnosis discussed along with evaluation of therapies. Questions answered.  Cryotherapy/brachy therapy as indicated with instructions.   TKA: Patient reports no pertinent medical issues needing clearance. Continuation of conservative management vs. TKA: I reviewed anatomy of a total knee arthroplasty in laymen's terms, as well as typical postoperative recovery and possibly 6-12 months for maximal recovery, and possible need for rehabilitation stay after hospitalization. We also discussed risks, benefits, alternatives, and limitations of procedure with risks including but not limited to neurovascular damage, bleeding, infection, malalignment,  chronic pian, failure of implants, osteolysis, loosening of implants, loss of motion, weakness, stiffness, instability, DVT, pulmonary embolus, death, stroke, complex regional pain syndrome, myocardial infarction, and need for additional procedures. Concept of substitution vs. replacement discussed.  No guarantees were given regarding results of surgery.  Patient verbalized understanding, and was given the opportunity to ask and have all questions answered today.       10/13/2017

## 2017-11-02 NOTE — PROGRESS NOTES
Pre-Operative Orthopedic Assessment      Patient: Urbano Mares    YOB: 1970      Age/Gender: 47 y.o. male  Medical Record Number: [id number]  Surgical Procedure Planned: LEFT TOTAL KNEE ARTHROPLASTY WITH CECILY NAVIGATION     Surgeon: Anthony Lujan MD    Date of Surgery Planned: 11/14/2017    Question Value Score    Patient Score   1. What is your age group? 50-65 years  66-75 years  >75 years =2  =1  =0 2   2. Gender? Male  Female =2  =1 2   3. How far on average can you walk? (a block is 200 meters) Two blocks or more (+\- rest)  1-2 blocks (+\- rest)  Housebound (most of time) =2  =1  =0 1   4. Which gait aid to you use? (more often than not) None  Single-Point Stick  Crutches/Frame =2  =1  =0 1   5. Do you use community  supports? (home help, meals on wheels, district nursing) None or one per week  Two or more per week =1  =0 1   6. Will you live with someone who can care for you after your operation? Yes  No =3  =0 3      Your Score (out of 12)  10     Key Destination at Discharge from acute care predicted by score   Scores < 6  -- extended inpatient rehabilitation   Scores 6-9 -- additional intervention to discharge directly home (Rehabilitation in home)   Scores > 9 -- directly home         Discharge Disposition/Planning:     Patient Response   Discussed the Predicted discharge disposition needed based on RAPT Assessment with the patient.    yes   Patient selected discharge disposition:   home   Out Patient Rehabilitation Facility of Choice:    n/a   Home Health Services Preferred:   undecided   Has the patient completed or been scheduled to complete pre-operative testing? Scheduled for 11/7/17 at 0800   Post-Operative Care Giver Name and Phone Number:    Urmila (wife) [phone]     Subacute Inpatient Rehabilitation:  Complete this section only if planning inpatient services at a Subacute Facility     Patient Response   Subacute Facility Preferred (Please list 2  facilities:   n/a   Requires pre-certification for inpatient rehabilitation services?      n/a   Planned source of transportation to inpatient rehabilitation facility?   n/a    If choosing inpatient services at an Acute or Subacute Facility please list a subsequent back-up plan (in case patient fails to qualify for inpatient rehabilitation). Back-up plans should include caregiver (family member or friend) for first 24-48 post- -operatively.    n/a   Home Equipment Patient Response   Does patient have a walker for home use?    no   Does patient have a 3 in 1 commode for home use?    no   Does patient have a shower chair for home use?    yes   Does patient have an elevated commode seat for home use? Have higher height commode   Does patient have a cane for home use?    yes   Is there any other medical equipment in the home? If so,  List in comment section below no   Pre-Operative Class Attendance Patient Response   Attended or scheduled to attend the pre-operative class within 1 year of total joint replacement? Plans to attend on 11/7/17   Not planning to attend the pre-operative class (see comments below)    n/a   Patient Education  Completed   Expected time of discharge discussed yes   Encouraged to attend Pre-Operative Class    yes   Education re: Back-up plan for patients planning discharge to subacute facilities n/a   Education re: Predicted Discharge Disposition based on RAPT score    yes   Patient receptive and voiced understanding of information given    yes                                                                                                            Comments:    Spoke with patient Urbano Mares via return call [phone].  Verified home address to be correct on face sheet of [address] [place] [address].  Lives with wife in a 3 level house.  Stated that there are 3 steps to the back entry of the house with no railing.  Stated that bedrooms are upstairs, but he will probably just  go down stairs and stay down there for a few days.  Railing noted at stairs inside.   He informs wife will be off 4 days to help him at home.  Uses a cane occasionally.  Has done outpatient therapy at [facility],  has a family member that is a therapist there, and would prefer to do outpatient there after home health.  Patients current discharge plan is to go return home at with assist of spouse Urmila and prefers home health to follow (need to provide patient with listing of home health agencies/ and obtain patients preference).  Astrid Andrade RN                                           Signature: Astrid Andrade RN    Date:  11/2/2017

## 2017-11-07 ENCOUNTER — APPOINTMENT (OUTPATIENT)
Dept: PREADMISSION TESTING | Facility: HOSPITAL | Age: 47
End: 2017-11-07

## 2017-11-07 VITALS
WEIGHT: 250 LBS | RESPIRATION RATE: 16 BRPM | SYSTOLIC BLOOD PRESSURE: 134 MMHG | HEART RATE: 52 BPM | TEMPERATURE: 97.8 F | HEIGHT: 72 IN | DIASTOLIC BLOOD PRESSURE: 89 MMHG | OXYGEN SATURATION: 95 % | BODY MASS INDEX: 33.86 KG/M2

## 2017-11-07 DIAGNOSIS — M17.12 ARTHRITIS OF LEFT KNEE: ICD-10-CM

## 2017-11-07 LAB
ANION GAP SERPL CALCULATED.3IONS-SCNC: 13.1 MMOL/L
BILIRUB UR QL STRIP: NEGATIVE
BUN BLD-MCNC: 21 MG/DL (ref 6–20)
BUN/CREAT SERPL: 21.9 (ref 7–25)
CALCIUM SPEC-SCNC: 9.8 MG/DL (ref 8.6–10.5)
CHLORIDE SERPL-SCNC: 102 MMOL/L (ref 98–107)
CLARITY UR: CLEAR
CO2 SERPL-SCNC: 24.9 MMOL/L (ref 22–29)
COLOR UR: YELLOW
CREAT BLD-MCNC: 0.96 MG/DL (ref 0.76–1.27)
DEPRECATED RDW RBC AUTO: 50.7 FL (ref 37–54)
ERYTHROCYTE [DISTWIDTH] IN BLOOD BY AUTOMATED COUNT: 14.2 % (ref 11.5–14.5)
GFR SERPL CREATININE-BSD FRML MDRD: 83 ML/MIN/1.73
GLUCOSE BLD-MCNC: 115 MG/DL (ref 65–99)
GLUCOSE UR STRIP-MCNC: NEGATIVE MG/DL
HCT VFR BLD AUTO: 46.2 % (ref 40.4–52.2)
HGB BLD-MCNC: 14.7 G/DL (ref 13.7–17.6)
HGB UR QL STRIP.AUTO: NEGATIVE
KETONES UR QL STRIP: NEGATIVE
LEUKOCYTE ESTERASE UR QL STRIP.AUTO: NEGATIVE
MCH RBC QN AUTO: 30.9 PG (ref 27–32.7)
MCHC RBC AUTO-ENTMCNC: 31.8 G/DL (ref 32.6–36.4)
MCV RBC AUTO: 97.1 FL (ref 79.8–96.2)
NITRITE UR QL STRIP: NEGATIVE
PH UR STRIP.AUTO: <=5 [PH] (ref 5–8)
PLATELET # BLD AUTO: 192 10*3/MM3 (ref 140–500)
PMV BLD AUTO: 11.1 FL (ref 6–12)
POTASSIUM BLD-SCNC: 5 MMOL/L (ref 3.5–5.2)
PROT UR QL STRIP: NEGATIVE
RBC # BLD AUTO: 4.76 10*6/MM3 (ref 4.6–6)
SODIUM BLD-SCNC: 140 MMOL/L (ref 136–145)
SP GR UR STRIP: 1.02 (ref 1–1.03)
UROBILINOGEN UR QL STRIP: NORMAL
WBC NRBC COR # BLD: 8.29 10*3/MM3 (ref 4.5–10.7)

## 2017-11-07 PROCEDURE — 93010 ELECTROCARDIOGRAM REPORT: CPT | Performed by: INTERNAL MEDICINE

## 2017-11-07 PROCEDURE — 80048 BASIC METABOLIC PNL TOTAL CA: CPT | Performed by: ORTHOPAEDIC SURGERY

## 2017-11-07 PROCEDURE — 81003 URINALYSIS AUTO W/O SCOPE: CPT | Performed by: ORTHOPAEDIC SURGERY

## 2017-11-07 PROCEDURE — 36415 COLL VENOUS BLD VENIPUNCTURE: CPT

## 2017-11-07 PROCEDURE — 93005 ELECTROCARDIOGRAM TRACING: CPT

## 2017-11-07 PROCEDURE — 85027 COMPLETE CBC AUTOMATED: CPT | Performed by: ORTHOPAEDIC SURGERY

## 2017-11-07 ASSESSMENT — KOOS JR
KOOS JR SCORE: 21
KOOS JR SCORE: 34.174

## 2017-11-07 NOTE — DISCHARGE INSTRUCTIONS
NO medications the morning of surgery:        General Instructions:  • Do not eat solid food after midnight the night before surgery.  • You may drink clear liquids day of surgery but must stop at least one hour before your hospital arrival TIME @ 1000 AM STOP DRINKING!!!  • It is beneficial for you to have a clear drink that contains carbohydrates the day of surgery.  We suggest a 12 to 20 ounce bottle of Gatorade or Powerade for non-diabetic patients or a 12 to 20 ounce bottle of G2 or Powerade Zero for diabetic patients.     Clear liquids are liquids you can see through.  Nothing red in color.     Plain water                               Sports drinks  Sodas                                   Gelatin (Jell-O)  Fruit juices without pulp such as white grape juice and apple juice  Popsicles that contain no fruit or yogurt  Tea or coffee (no cream or milk added)  Gatorade / Powerade  G2 / Powerade Zero    • Patients who avoid smoking, chewing tobacco and alcohol for 4 weeks prior to surgery have a reduced risk of post-operative complications.  Quit smoking as many days before surgery as you can.  • Do not smoke, use chewing tobacco or drink alcohol the day of surgery.   • Bring any papers given to you in the doctor’s office.  • Wear clean comfortable clothes and socks.  • Do not wear contact lenses or make-up.  Bring a case for your glasses.   • Bring crutches or walker if applicable.  • Remove all piercings.  Leave jewelry and any other valuables at home.  • The Pre-Admission Testing nurse will instruct you to bring medications if unable to obtain an accurate list in Pre-Admission Testing.        Preventing a Surgical Site Infection:  • For 2 to 3 days before surgery, avoid shaving with a razor because the razor can irritate skin and make it easier to develop an infection.  • The night prior to surgery sleep in a clean bed with clean clothing.  Do not allow pets to sleep with you.  • Shower on the morning of surgery  using a fresh bar of anti-bacterial soap (such as Dial) and clean washcloth.  Dry with a clean towel and dress in clean clothing.  • Ask your surgeon if you will be receiving antibiotics prior to surgery.  • Make sure you, your family, and all healthcare providers clean their hands with soap and water or an alcohol based hand  before caring for you or your wound.    Day of surgery: 11- ARRIVE @ 1100 AM REPORT TO THE MAIN OR   Upon arrival, a Pre-op nurse and Anesthesiologist will review your health history, obtain vital signs, and answer questions you may have.  The only belongings needed at this time will be your home medications.  If you are staying overnight your family can leave the rest of your belongings in the car and bring them to your room later.  A Pre-op nurse will start an IV and you may receive medication in preparation for surgery, including something to help you relax.  Your family will be able to see you in the Pre-op area.  While you are in surgery your family should notify the waiting room  if they leave the waiting room area and provide a contact phone number.    Please be aware that surgery does come with discomfort.  We want to make every effort to control your discomfort so please discuss any uncontrolled symptoms with your nurse.   Your doctor will most likely have prescribed pain medications.      If you are going home after surgery you will receive individualized written care instructions before being discharged.  A responsible adult must drive you to and from the hospital on the day of your surgery and stay with you for 24 hours.    If you are staying overnight following surgery, you will be transported to your hospital room following the recovery period.  [facility] has all private rooms.    If you have any questions please call Pre-Admission Testing at [phone].  Deductibles and co-payments are collected on the day of service. Please be prepared  to pay the required co-pay, deductible or deposit on the day of service as defined by your plan.    2% CHLORAHEXIDINE GLUCONATE* CLOTH  Preparing or “prepping” skin before surgery can reduce the risk of infection at the surgical site. To make the process easier, [facility] has chosen disposable cloths moistened with a rinse-free, 2% Chlorhexidine Gluconate (CHG) antiseptic solution. The steps below outline the prepping process and should be carefully followed.        Use the prep cloth on the area that is circled in the diagram             Directions Night before Surgery 11- PM PRIOR TO SURGERY (LEFT LEG)  1) Shower using a fresh bar of anti-bacterial soap (such as Dial) and clean washcloth.  Use a clean towel to completely dry your skin.  2) Do not use any lotions, oils or creams on your skin.  3) Open the package and remove 1 cloth, wipe your skin for 30 seconds in a circular motion.  Allow to dry for 3 minutes.  4) Repeat #3 with second cloth.  5) Do not touch your eyes, ears, or mouth with the prep cloth.  6) Allow the wet prep solution to air dry.  7) Discard the prep cloth and wash your hands with soap and water.   8) Dress in clean bed clothes and sleep on fresh clean bed sheets.   9) You may experience some temporary itching after the prep.    Directions Day of Surgery 11- AM PRIOR TO SURGERY (LEFT LEG)  1) Repeat steps 1,2,3,4,5,6,7, and 9.   2) Dress in clean clothes before coming to the hospital.    BACTROBAN NASAL OINTMENT  There are many germs normally in your nose. Bactroban is an ointment that will help reduce these germs. Please follow these instructions for Bactroban use:      ____The day before surgery in the morning  [id number]_______    ____The day before surgery in the evening              [id number]___    ____The day of surgery in the morning    [id number]_______    **Squirt ½ package of Bactroban Ointment onto a cotton applicator and apply to  inside of 1st nostril.  Squirt the remaining Bactroban and apply to the inside of the other nostril..

## 2017-11-09 ENCOUNTER — OFFICE VISIT (OUTPATIENT)
Dept: ORTHOPEDIC SURGERY | Facility: CLINIC | Age: 47
End: 2017-11-09

## 2017-11-09 VITALS — BODY MASS INDEX: 33.86 KG/M2 | HEIGHT: 72 IN | TEMPERATURE: 97.7 F | WEIGHT: 250 LBS

## 2017-11-09 DIAGNOSIS — M17.12 ARTHRITIS OF LEFT KNEE: Primary | ICD-10-CM

## 2017-11-09 PROCEDURE — S0260 H&P FOR SURGERY: HCPCS | Performed by: NURSE PRACTITIONER

## 2017-11-09 NOTE — PROGRESS NOTES
History & Physical       Patient: Urbano Mares  YOB: 1970  Medical Record Number: [id number]  Body mass index is 33.91 kg/(m^2).    Surgeon:  Dr. Anthony Lujan    Chief Complaints:   Chief Complaint   Patient presents with   • Left Knee - Pre-op Exam       Subjective:    History of Present Illness: 47 y.o. male presents with   Chief Complaint   Patient presents with   • Left Knee - Pre-op Exam   . Onset of symptoms was years ago and has been progressively worsening despite more conservative treatment measures.  Symptoms are associated with ability to move, exercise, and perform activities of daily living.  Symptoms are aggravated by weight bearing and ROM necessary for activities of daily living.   Symptoms improve with rest, ice and elevation only minimally.      Allergies: No Known Allergies    Medications:   Home Medications:  Current Outpatient Prescriptions on File Prior to Visit   Medication Sig   • acetaminophen (TYLENOL) 500 MG tablet Take 500 mg by mouth Every 6 (Six) Hours As Needed for Mild Pain .   • CHLORHEXIDINE GLUCONATE CLOTH EX Apply  topically. AS DIRECTED:  PM DAY BEFORE SURGERY  AM DAY OF SURGERY   • ibuprofen (ADVIL,MOTRIN) 200 MG tablet Take 200 mg by mouth Every 6 (Six) Hours As Needed for Mild Pain . HOLD PRIOR TO SURGERY   • lisinopril (PRINIVIL,ZESTRIL) 10 MG tablet Take 2 tablets by mouth Daily.   • meloxicam (MOBIC) 15 MG tablet Take 15 mg by mouth Daily. HOLD PRIOR TO SURGERY   • mupirocin (BACTROBAN) 2 % nasal ointment into each nostril. AS DIRECTED:  AM & PM DAY BEFORE SURGERY  AM DAY OF SURGERY   • naproxen (NAPROSYN) 250 MG tablet Take 250 mg by mouth 2 (Two) Times a Day As Needed. HOLD PRIOR TO SURGERY     Current Facility-Administered Medications on File Prior to Visit   Medication   • [] mupirocin (BACTROBAN) 2 % nasal ointment     Current Medications:  Scheduled Meds:  Continuous Infusions:  No current facility-administered medications for this visit.    PRN Meds:.    I have reviewed the patient's medical history in detail and updated the computerized patient record.  Review and summarization of old records include:    Past Medical History:   Diagnosis Date   • Arthritis     OSTEO   • Hypertension    • Joint pain    • Left knee pain         Past Surgical History:   Procedure Laterality Date   • WISDOM TOOTH EXTRACTION          Social History     Occupational History   • Not on file.     Social History Main Topics   • Smoking status: Current Some Day Smoker     Types: Cigars   • Smokeless tobacco: Never Used      Comment: FEW YEARLY    • Alcohol use 3.6 oz/week     3 Shots of liquor, 3 Cans of beer per week   • Drug use: No   • Sexual activity: Defer    Social History     Social History Narrative        Family History   Problem Relation Age of Onset   • Cancer Mother    • Cancer Father      Prostate, Bladder, Brain   • Hypertension Sister    • Hypertension Brother    • Malig Hyperthermia Neg Hx        ROS: 14 point review of systems was performed and was negative except for documented findings in HPI and today's encounter.     Allergies: No Known Allergies  Constitutional:  Denies fever, shaking or chills   Eyes:  Denies change in visual acuity   HENT:  Denies nasal congestion or sore throat   Respiratory:  Denies cough or shortness of breath   Cardiovascular:  Denies chest pain or severe LE edema   GI:  Denies abdominal pain, nausea, vomiting, bloody stools or diarrhea   Musculoskeletal:  Denies numbness tingling or loss of motor function except as outlined above in history of present illness.  : Denies painful urination or hematuria  Integument:  Denies rash, lesion or ulceration   Neurologic:  Denies headache or focal weakness  Endocrine:  Denies lymphadenopathy  Psych:  Denies confusion or change in mental status   Hem:  Denies active bleeding    Physical Exam: 47 y.o. male  Body mass index is 33.91 kg/(m^2)., @HT@, @WT@  Vitals:    11/09/17 0748   Temp: 97.7  °F (36.5 °C)     Vital signs reviewed.   General Appearance:    Alert, cooperative, in no acute distress                  Eyes: conjunctiva clear  ENT: external ears and nose atraumatic  CV: no peripheral edema  Resp: normal respiratory effort  Skin: no rashes or wounds; normal turgor  Psych: mood and affect appropriate  Lymph: no nodes appreciated  Neuro: gross sensation intact  Vascular:  Palpable peripheral pulse in noted extremity  Musculoskeletal Extremities: DETAILED KNEE Exam: Left knee: Painful gait w/wo limp, muscle atrophy, erythema, ecchymosis, or gross deformity noted, Large knee effusion, + medial joint line tenderness, Active range of motion flexion contracture, 5/5 strength flexion and extension, The knee is stable to varus and valgus stress testing, VARUS VALGUS NEUTRAL: varus alignment of the limb, Lachman negative, Posterior drawer negative, Evy's negative, Patellofemoral grind +, Sensation grossly intact to light tough throughout the lower extremity, Skin is intact, Distal pulses are palpable, No signs or symptoms of DVT          Diagnostic Tests:  Appointment on 11/07/2017   Component Date Value Ref Range Status   • Glucose 11/07/2017 115* 65 - 99 mg/dL Final   • BUN 11/07/2017 21* 6 - 20 mg/dL Final   • Creatinine 11/07/2017 0.96  0.76 - 1.27 mg/dL Final   • Sodium 11/07/2017 140  136 - 145 mmol/L Final   • Potassium 11/07/2017 5.0  3.5 - 5.2 mmol/L Final   • Chloride 11/07/2017 102  98 - 107 mmol/L Final   • CO2 11/07/2017 24.9  22.0 - 29.0 mmol/L Final   • Calcium 11/07/2017 9.8  8.6 - 10.5 mg/dL Final   • eGFR Non African Amer 11/07/2017 83  >60 mL/min/1.73 Final   • BUN/Creatinine Ratio 11/07/2017 21.9  7.0 - 25.0 Final   • Anion Gap 11/07/2017 13.1  mmol/L Final   • WBC 11/07/2017 8.29  4.50 - 10.70 10*3/mm3 Final   • RBC 11/07/2017 4.76  4.60 - 6.00 10*6/mm3 Final   • Hemoglobin 11/07/2017 14.7  13.7 - 17.6 g/dL Final   • Hematocrit 11/07/2017 46.2  40.4 - 52.2 % Final   • MCV  11/07/2017 97.1* 79.8 - 96.2 fL Final   • MCH 11/07/2017 30.9  27.0 - 32.7 pg Final   • MCHC 11/07/2017 31.8* 32.6 - 36.4 g/dL Final   • RDW 11/07/2017 14.2  11.5 - 14.5 % Final   • RDW-SD 11/07/2017 50.7  37.0 - 54.0 fl Final   • MPV 11/07/2017 11.1  6.0 - 12.0 fL Final   • Platelets 11/07/2017 192  140 - 500 10*3/mm3 Final   • Color, UA 11/07/2017 Yellow  Yellow, Straw Final   • Appearance, UA 11/07/2017 Clear  Clear Final   • pH, UA 11/07/2017 <=5.0  5.0 - 8.0 Final   • Specific Gravity, UA 11/07/2017 1.020  1.005 - 1.030 Final   • Glucose, UA 11/07/2017 Negative  Negative Final   • Ketones, UA 11/07/2017 Negative  Negative Final   • Bilirubin, UA 11/07/2017 Negative  Negative Final   • Blood, UA 11/07/2017 Negative  Negative Final   • Protein, UA 11/07/2017 Negative  Negative Final   • Leuk Esterase, UA 11/07/2017 Negative  Negative Final   • Nitrite, UA 11/07/2017 Negative  Negative Final   • Urobilinogen, UA 11/07/2017 0.2 E.U./dL  0.2 - 1.0 E.U./dL Final     Xr Knee 3 View Bilateral    Result Date: 10/13/2017  Impression: Ordering physician's impression is located in the Encounter Note dated 10/13/17       Imaging was done previously in the office, images were personally viewed and discussed at length with the patient:    Indication: pain related symptoms,  Views: 3V AP, LAT & 40 degree PA bilateral knee(s)   Findings: severe end-stage arthritis (bone on bone, subchondral sclerosis/cysts, osteophytes)  Comparison views: viewed last xray done in the office.     Assessment:  Patient Active Problem List   Diagnosis   • Chronic pain of both knees   • Arthritis of left knee   • Arthritis of right knee       Plan:  Dr. Anthony Lujan reviewed anatomy of a total joint arthroplasty in laymen's terms, as well as typical postoperative recovery and possibly 6-12 months for maximal recovery, and possible need for rehabilitation stay after hospitalization. We also discussed risks, benefits, alternatives, and limitations  of procedure with risks including but not limited to neurovascular damage, bleeding, infection, malalignment, chronic pian, failure of implants, osteolysis, loosening of implants, loss of motion, weakness, stiffness, instability, DVT, pulmonary embolus, death, stroke, complex regional pain syndrome, myocardial infarction, and need for additional procedures. Concept of substitution vs. replacement discussed.  No guarantees were given regarding results of surgery.      Urbano Mares was given the opportunity to ask and have all questions answered today.  The patient voiced understanding of the risks, benefits, and alternative forms of treatment that were discussed and the patient consents to proceed with surgery.     Patient's blood clot history is negative.  Planned DVT prophylaxis for surgery:  Aspirin    Discharge Plan: POD 2-3 to home and home health    Patient was seen by JOHN Armstrong in the office today.    Date: 11/9/2017  JOHN Hinkle

## 2017-11-09 NOTE — PATIENT INSTRUCTIONS
[facility]    KNEE HOME EXERCISES      It is important that you maintain and possibly improve your strength and range of motion of your knee.      •  Walk frequently for short intervals  •  Using a cane or walker to allow you to walk safely if needed  •  Avoid sitting for long periods of time to avoid cramping and swelling of your leg   •  Do exercises either on a bed or in a chair.  •  If any of the exercises cause you pain, either eliminate that exercise or decrease          the motion or repetitions      Start exercises with 10 repetitions and increase to 30 repetitions.  Do two sets of each exercise each day    ANKLE PUMPS    1. Move your feet up and down as if you are pumping on a gas pedal       STRAIGHT LEG RAISES    1. Lie flat with your injured leg straight.  Keep the other leg bent.  2. Tighten the injured leg's thigh muscle.  3. Lift leg, with knee locked in the straight position no higher than the other knee for  seconds  4. Lower leg slowly. Rest for 5 seconds      SHORT ARC QUAD    1. Lie with both knees bent over a pillow  2. Straighten both knees  3. Hold 5 seconds  4. Bend knees back to starting position and repeat sequence       QUADRICEPS     1. Lie flat with your injured let straight.  Keep the other leg bent.  2. Tighten the injured leg's thigh muscle by trying to move your kneecap toward the  thigh.  3. Make your leg as stiff as you can.  4. Hold for 5 seconds and relax for 5 seconds        HAMSTRING STRETCH    1. Lie flat with your injured leg straight. Keep the other leg bent  2. Press your heel of your injured leg into the floor for 5 seconds       OR  1. Sit with injured leg out straight.   2. Loop a sheet around the ball of foot and toes.  3. Gently pull on the sheet, keeping the leg straight  4. Hold for 10-30 seconds      KNEE FLEXION-EXTENSION SITTING     1. Sit with injured let bent as shown  2. Straighten leg at the knee  3. Hold 5 seconds  4. Bend knee back  to starting position   5. Rest for 2-5 seconds and repeat sequence       HEEL SLIDES     1. Lie on back with legs straight  2. Slide heels toward buttocks  3. Return to starting position       HEEL SLIDS WITH SHEET    1. Lie on your back with a sheet wrapped around your foot  2. Pull on the sheet to assist you in bending your knee and sliding your heel toward  your buttocks  3. Hold for 5 seconds        KNEE FLEXION STRETCH    1. Sit in a chair  2. Bend bad knee back as far as you can   3. Hold stretch for 5-10 seconds      CHAIR PUSH UPS    1. Sit in a chair with arm rests  2. Place hands on armrests  3. Straighten arms, raising buttocks up off of chair         WALL SLIDES    1. Stand with your back and head against a wall.    2. Keep your feet shoulder width apart and 6-12 inches from the wall  3. Slowly slide down the wall until your knees are slightly bent.    4. Hold for 5 seconds and then slowly slide back up  5. As you get stronger, then you can slowly increase the bend in your knees, but do  not drop your buttocks below the level of your knees       STEP UPS    1. Stand with your foot on your injured leg on a 3-5 inch support (such as a block of  wood)  2. Place other foot on the floor  3. Shift your weight onto the foot on the block and try to straighten the knee and  raising the other foot off of the floor  4. Slowly lower your foot until only the heel touches the floor

## 2017-11-14 ENCOUNTER — ANESTHESIA EVENT (OUTPATIENT)
Dept: PERIOP | Facility: HOSPITAL | Age: 47
End: 2017-11-14

## 2017-11-14 ENCOUNTER — APPOINTMENT (OUTPATIENT)
Dept: GENERAL RADIOLOGY | Facility: HOSPITAL | Age: 47
End: 2017-11-14

## 2017-11-14 ENCOUNTER — HOSPITAL ENCOUNTER (INPATIENT)
Facility: HOSPITAL | Age: 47
LOS: 2 days | Discharge: HOME HEALTH | End: 2017-11-16
Attending: ORTHOPAEDIC SURGERY | Admitting: ORTHOPAEDIC SURGERY

## 2017-11-14 ENCOUNTER — ANESTHESIA (OUTPATIENT)
Dept: PERIOP | Facility: HOSPITAL | Age: 47
End: 2017-11-14

## 2017-11-14 DIAGNOSIS — R26.2 DIFFICULTY WALKING: Primary | ICD-10-CM

## 2017-11-14 DIAGNOSIS — M17.12 ARTHRITIS OF LEFT KNEE: ICD-10-CM

## 2017-11-14 PROCEDURE — 0SRD0J9 REPLACEMENT OF LEFT KNEE JOINT WITH SYNTHETIC SUBSTITUTE, CEMENTED, OPEN APPROACH: ICD-10-PCS | Performed by: ORTHOPAEDIC SURGERY

## 2017-11-14 PROCEDURE — C1776 JOINT DEVICE (IMPLANTABLE): HCPCS | Performed by: ORTHOPAEDIC SURGERY

## 2017-11-14 PROCEDURE — 73560 X-RAY EXAM OF KNEE 1 OR 2: CPT

## 2017-11-14 PROCEDURE — 25010000002 HYDRALAZINE PER 20 MG: Performed by: NURSE ANESTHETIST, CERTIFIED REGISTERED

## 2017-11-14 PROCEDURE — C1713 ANCHOR/SCREW BN/BN,TIS/BN: HCPCS | Performed by: ORTHOPAEDIC SURGERY

## 2017-11-14 PROCEDURE — 25010000002 HYDROMORPHONE PER 4 MG: Performed by: NURSE ANESTHETIST, CERTIFIED REGISTERED

## 2017-11-14 PROCEDURE — 8E0YXBZ COMPUTER ASSISTED PROCEDURE OF LOWER EXTREMITY: ICD-10-PCS | Performed by: ORTHOPAEDIC SURGERY

## 2017-11-14 PROCEDURE — 25010000003 CEFAZOLIN IN DEXTROSE 2-4 GM/100ML-% SOLUTION: Performed by: ORTHOPAEDIC SURGERY

## 2017-11-14 PROCEDURE — 25010000002 EPINEPHRINE PER 0.1 MG: Performed by: ORTHOPAEDIC SURGERY

## 2017-11-14 PROCEDURE — 20985 CPTR-ASST DIR MS PX: CPT | Performed by: ORTHOPAEDIC SURGERY

## 2017-11-14 PROCEDURE — 25010000002 MIDAZOLAM PER 1 MG: Performed by: ANESTHESIOLOGY

## 2017-11-14 PROCEDURE — 25010000002 ROPIVACAINE PER 1 MG: Performed by: ORTHOPAEDIC SURGERY

## 2017-11-14 PROCEDURE — 25010000002 DEXAMETHASONE PER 1 MG: Performed by: NURSE ANESTHETIST, CERTIFIED REGISTERED

## 2017-11-14 PROCEDURE — 25010000002 SUCCINYLCHOLINE PER 20 MG: Performed by: ANESTHESIOLOGY

## 2017-11-14 PROCEDURE — 25010000002 FENTANYL CITRATE (PF) 100 MCG/2ML SOLUTION: Performed by: ANESTHESIOLOGY

## 2017-11-14 PROCEDURE — 25010000002 PROPOFOL 10 MG/ML EMULSION: Performed by: NURSE ANESTHETIST, CERTIFIED REGISTERED

## 2017-11-14 PROCEDURE — 25010000002 KETOROLAC TROMETHAMINE PER 15 MG: Performed by: ORTHOPAEDIC SURGERY

## 2017-11-14 PROCEDURE — 25010000002 MORPHINE SULFATE (PF) 8 MG/ML SOLUTION 1 ML VIAL: Performed by: ORTHOPAEDIC SURGERY

## 2017-11-14 PROCEDURE — 27447 TOTAL KNEE ARTHROPLASTY: CPT | Performed by: ORTHOPAEDIC SURGERY

## 2017-11-14 PROCEDURE — 25010000002 FENTANYL CITRATE (PF) 100 MCG/2ML SOLUTION: Performed by: NURSE ANESTHETIST, CERTIFIED REGISTERED

## 2017-11-14 DEVICE — P.F.C. SIGMA OVAL DOME PATELLA 3-PEG 41MM CEMENTED
Type: IMPLANTABLE DEVICE | Site: KNEE | Status: FUNCTIONAL
Brand: P.F.C. SIGMA

## 2017-11-14 DEVICE — SIGMA TIBIAL INSERT FIXED BEARING CURVED PLUS 4 10MM XLK
Type: IMPLANTABLE DEVICE | Site: KNEE | Status: FUNCTIONAL
Brand: SIGMA

## 2017-11-14 DEVICE — SIGMA FEMORAL CRUCIATE RETAINING CEMENTED 4 LEFT
Type: IMPLANTABLE DEVICE | Site: KNEE | Status: FUNCTIONAL
Brand: SIGMA

## 2017-11-14 DEVICE — SMARTSET GMV HIGH PERFORMANCE GENTAMICIN MEDIUM VISCOSITY BONE CEMENT 40G
Type: IMPLANTABLE DEVICE | Site: KNEE | Status: FUNCTIONAL
Brand: SMARTSET

## 2017-11-14 DEVICE — P.F.C. SIGMA TIBIAL TRAY FIXED BEARING MODULAR COCR 4 CEMENTED
Type: IMPLANTABLE DEVICE | Site: KNEE | Status: FUNCTIONAL
Brand: P.F.C. SIGMA

## 2017-11-14 DEVICE — IMPLANTABLE DEVICE: Type: IMPLANTABLE DEVICE | Site: KNEE | Status: FUNCTIONAL

## 2017-11-14 RX ORDER — FLUMAZENIL 0.1 MG/ML
0.2 INJECTION INTRAVENOUS AS NEEDED
Status: DISCONTINUED | OUTPATIENT
Start: 2017-11-14 | End: 2017-11-14 | Stop reason: HOSPADM

## 2017-11-14 RX ORDER — HYDROMORPHONE HCL IN 0.9% NACL 10 MG/50ML
PATIENT CONTROLLED ANALGESIA SYRINGE INTRAVENOUS
Status: COMPLETED
Start: 2017-11-14 | End: 2017-11-14

## 2017-11-14 RX ORDER — OXYCODONE AND ACETAMINOPHEN 7.5; 325 MG/1; MG/1
2 TABLET ORAL
Status: DISCONTINUED | OUTPATIENT
Start: 2017-11-14 | End: 2017-11-15

## 2017-11-14 RX ORDER — SODIUM CHLORIDE, SODIUM LACTATE, POTASSIUM CHLORIDE, CALCIUM CHLORIDE 600; 310; 30; 20 MG/100ML; MG/100ML; MG/100ML; MG/100ML
100 INJECTION, SOLUTION INTRAVENOUS CONTINUOUS
Status: ACTIVE | OUTPATIENT
Start: 2017-11-14 | End: 2017-11-15

## 2017-11-14 RX ORDER — ALBUTEROL SULFATE 2.5 MG/3ML
2.5 SOLUTION RESPIRATORY (INHALATION) ONCE AS NEEDED
Status: DISCONTINUED | OUTPATIENT
Start: 2017-11-14 | End: 2017-11-14 | Stop reason: HOSPADM

## 2017-11-14 RX ORDER — ACETAMINOPHEN 325 MG/1
325 TABLET ORAL EVERY 4 HOURS PRN
Status: DISCONTINUED | OUTPATIENT
Start: 2017-11-14 | End: 2017-11-16 | Stop reason: HOSPADM

## 2017-11-14 RX ORDER — FAMOTIDINE 10 MG/ML
20 INJECTION, SOLUTION INTRAVENOUS ONCE
Status: COMPLETED | OUTPATIENT
Start: 2017-11-14 | End: 2017-11-14

## 2017-11-14 RX ORDER — LIDOCAINE HYDROCHLORIDE 20 MG/ML
INJECTION, SOLUTION INFILTRATION; PERINEURAL AS NEEDED
Status: DISCONTINUED | OUTPATIENT
Start: 2017-11-14 | End: 2017-11-14 | Stop reason: SURG

## 2017-11-14 RX ORDER — FENTANYL CITRATE 50 UG/ML
50 INJECTION, SOLUTION INTRAMUSCULAR; INTRAVENOUS
Status: DISCONTINUED | OUTPATIENT
Start: 2017-11-14 | End: 2017-11-14 | Stop reason: HOSPADM

## 2017-11-14 RX ORDER — LISINOPRIL 20 MG/1
20 TABLET ORAL DAILY
Status: DISCONTINUED | OUTPATIENT
Start: 2017-11-14 | End: 2017-11-16 | Stop reason: HOSPADM

## 2017-11-14 RX ORDER — ONDANSETRON 4 MG/1
4 TABLET, FILM COATED ORAL EVERY 6 HOURS PRN
Status: DISCONTINUED | OUTPATIENT
Start: 2017-11-14 | End: 2017-11-16 | Stop reason: HOSPADM

## 2017-11-14 RX ORDER — CEFAZOLIN SODIUM 2 G/100ML
INJECTION, SOLUTION INTRAVENOUS
Status: DISPENSED
Start: 2017-11-14 | End: 2017-11-15

## 2017-11-14 RX ORDER — EPHEDRINE SULFATE 50 MG/ML
5 INJECTION, SOLUTION INTRAVENOUS ONCE AS NEEDED
Status: DISCONTINUED | OUTPATIENT
Start: 2017-11-14 | End: 2017-11-14 | Stop reason: HOSPADM

## 2017-11-14 RX ORDER — BISACODYL 10 MG
10 SUPPOSITORY, RECTAL RECTAL DAILY PRN
Status: DISCONTINUED | OUTPATIENT
Start: 2017-11-14 | End: 2017-11-16 | Stop reason: HOSPADM

## 2017-11-14 RX ORDER — PROMETHAZINE HYDROCHLORIDE 25 MG/1
25 SUPPOSITORY RECTAL ONCE AS NEEDED
Status: DISCONTINUED | OUTPATIENT
Start: 2017-11-14 | End: 2017-11-14 | Stop reason: HOSPADM

## 2017-11-14 RX ORDER — HYDRALAZINE HYDROCHLORIDE 20 MG/ML
5 INJECTION INTRAMUSCULAR; INTRAVENOUS
Status: DISCONTINUED | OUTPATIENT
Start: 2017-11-14 | End: 2017-11-14 | Stop reason: HOSPADM

## 2017-11-14 RX ORDER — NALOXONE HCL 0.4 MG/ML
0.1 VIAL (ML) INJECTION
Status: DISCONTINUED | OUTPATIENT
Start: 2017-11-14 | End: 2017-11-16 | Stop reason: HOSPADM

## 2017-11-14 RX ORDER — DIPHENHYDRAMINE HYDROCHLORIDE 50 MG/ML
12.5 INJECTION INTRAMUSCULAR; INTRAVENOUS
Status: DISCONTINUED | OUTPATIENT
Start: 2017-11-14 | End: 2017-11-14 | Stop reason: HOSPADM

## 2017-11-14 RX ORDER — SODIUM CHLORIDE 0.9 % (FLUSH) 0.9 %
1-10 SYRINGE (ML) INJECTION AS NEEDED
Status: DISCONTINUED | OUTPATIENT
Start: 2017-11-14 | End: 2017-11-14 | Stop reason: HOSPADM

## 2017-11-14 RX ORDER — PROMETHAZINE HYDROCHLORIDE 12.5 MG/1
12.5 TABLET ORAL EVERY 6 HOURS PRN
Status: DISCONTINUED | OUTPATIENT
Start: 2017-11-14 | End: 2017-11-16 | Stop reason: HOSPADM

## 2017-11-14 RX ORDER — ASPIRIN 325 MG
325 TABLET, DELAYED RELEASE (ENTERIC COATED) ORAL 2 TIMES DAILY
Status: DISCONTINUED | OUTPATIENT
Start: 2017-11-14 | End: 2017-11-16 | Stop reason: HOSPADM

## 2017-11-14 RX ORDER — ONDANSETRON 2 MG/ML
4 INJECTION INTRAMUSCULAR; INTRAVENOUS EVERY 6 HOURS PRN
Status: DISCONTINUED | OUTPATIENT
Start: 2017-11-14 | End: 2017-11-16 | Stop reason: HOSPADM

## 2017-11-14 RX ORDER — DEXAMETHASONE SODIUM PHOSPHATE 10 MG/ML
INJECTION INTRAMUSCULAR; INTRAVENOUS AS NEEDED
Status: DISCONTINUED | OUTPATIENT
Start: 2017-11-14 | End: 2017-11-14 | Stop reason: SURG

## 2017-11-14 RX ORDER — LABETALOL HYDROCHLORIDE 5 MG/ML
5 INJECTION, SOLUTION INTRAVENOUS
Status: DISCONTINUED | OUTPATIENT
Start: 2017-11-14 | End: 2017-11-14 | Stop reason: HOSPADM

## 2017-11-14 RX ORDER — OXYCODONE AND ACETAMINOPHEN 7.5; 325 MG/1; MG/1
1 TABLET ORAL
Status: DISCONTINUED | OUTPATIENT
Start: 2017-11-14 | End: 2017-11-15

## 2017-11-14 RX ORDER — SODIUM CHLORIDE, SODIUM LACTATE, POTASSIUM CHLORIDE, CALCIUM CHLORIDE 600; 310; 30; 20 MG/100ML; MG/100ML; MG/100ML; MG/100ML
9 INJECTION, SOLUTION INTRAVENOUS CONTINUOUS
Status: DISCONTINUED | OUTPATIENT
Start: 2017-11-14 | End: 2017-11-16 | Stop reason: HOSPADM

## 2017-11-14 RX ORDER — HYDROMORPHONE HYDROCHLORIDE 1 MG/ML
0.5 INJECTION, SOLUTION INTRAMUSCULAR; INTRAVENOUS; SUBCUTANEOUS
Status: DISCONTINUED | OUTPATIENT
Start: 2017-11-14 | End: 2017-11-14 | Stop reason: HOSPADM

## 2017-11-14 RX ORDER — MIDAZOLAM HYDROCHLORIDE 1 MG/ML
1 INJECTION INTRAMUSCULAR; INTRAVENOUS
Status: DISCONTINUED | OUTPATIENT
Start: 2017-11-14 | End: 2017-11-14 | Stop reason: HOSPADM

## 2017-11-14 RX ORDER — PROPOFOL 10 MG/ML
VIAL (ML) INTRAVENOUS AS NEEDED
Status: DISCONTINUED | OUTPATIENT
Start: 2017-11-14 | End: 2017-11-14 | Stop reason: SURG

## 2017-11-14 RX ORDER — HYDROMORPHONE HCL 110MG/55ML
PATIENT CONTROLLED ANALGESIA SYRINGE INTRAVENOUS AS NEEDED
Status: DISCONTINUED | OUTPATIENT
Start: 2017-11-14 | End: 2017-11-14 | Stop reason: SURG

## 2017-11-14 RX ORDER — OXYCODONE AND ACETAMINOPHEN 7.5; 325 MG/1; MG/1
1 TABLET ORAL ONCE AS NEEDED
Status: DISCONTINUED | OUTPATIENT
Start: 2017-11-14 | End: 2017-11-14 | Stop reason: HOSPADM

## 2017-11-14 RX ORDER — DIPHENHYDRAMINE HYDROCHLORIDE 50 MG/ML
25 INJECTION INTRAMUSCULAR; INTRAVENOUS EVERY 6 HOURS PRN
Status: DISCONTINUED | OUTPATIENT
Start: 2017-11-14 | End: 2017-11-16 | Stop reason: HOSPADM

## 2017-11-14 RX ORDER — PROMETHAZINE HYDROCHLORIDE 25 MG/ML
12.5 INJECTION, SOLUTION INTRAMUSCULAR; INTRAVENOUS ONCE AS NEEDED
Status: DISCONTINUED | OUTPATIENT
Start: 2017-11-14 | End: 2017-11-14 | Stop reason: HOSPADM

## 2017-11-14 RX ORDER — BISACODYL 5 MG/1
10 TABLET, DELAYED RELEASE ORAL DAILY PRN
Status: DISCONTINUED | OUTPATIENT
Start: 2017-11-14 | End: 2017-11-16 | Stop reason: HOSPADM

## 2017-11-14 RX ORDER — ONDANSETRON 4 MG/1
4 TABLET, ORALLY DISINTEGRATING ORAL EVERY 6 HOURS PRN
Status: DISCONTINUED | OUTPATIENT
Start: 2017-11-14 | End: 2017-11-16 | Stop reason: HOSPADM

## 2017-11-14 RX ORDER — NALOXONE HCL 0.4 MG/ML
0.2 VIAL (ML) INJECTION AS NEEDED
Status: DISCONTINUED | OUTPATIENT
Start: 2017-11-14 | End: 2017-11-14 | Stop reason: HOSPADM

## 2017-11-14 RX ORDER — PROMETHAZINE HYDROCHLORIDE 25 MG/1
25 TABLET ORAL ONCE AS NEEDED
Status: DISCONTINUED | OUTPATIENT
Start: 2017-11-14 | End: 2017-11-14 | Stop reason: HOSPADM

## 2017-11-14 RX ORDER — HYDROMORPHONE HCL IN 0.9% NACL 10 MG/50ML
PATIENT CONTROLLED ANALGESIA SYRINGE INTRAVENOUS CONTINUOUS
Status: DISCONTINUED | OUTPATIENT
Start: 2017-11-14 | End: 2017-11-16 | Stop reason: HOSPADM

## 2017-11-14 RX ORDER — CEFAZOLIN SODIUM 2 G/100ML
2 INJECTION, SOLUTION INTRAVENOUS EVERY 8 HOURS
Status: COMPLETED | OUTPATIENT
Start: 2017-11-14 | End: 2017-11-15

## 2017-11-14 RX ORDER — MIDAZOLAM HYDROCHLORIDE 1 MG/ML
2 INJECTION INTRAMUSCULAR; INTRAVENOUS
Status: DISCONTINUED | OUTPATIENT
Start: 2017-11-14 | End: 2017-11-14 | Stop reason: HOSPADM

## 2017-11-14 RX ORDER — HYDROCODONE BITARTRATE AND ACETAMINOPHEN 7.5; 325 MG/1; MG/1
1 TABLET ORAL ONCE AS NEEDED
Status: DISCONTINUED | OUTPATIENT
Start: 2017-11-14 | End: 2017-11-14 | Stop reason: HOSPADM

## 2017-11-14 RX ORDER — DOCUSATE SODIUM 100 MG/1
100 CAPSULE, LIQUID FILLED ORAL 2 TIMES DAILY
Status: DISCONTINUED | OUTPATIENT
Start: 2017-11-14 | End: 2017-11-16 | Stop reason: HOSPADM

## 2017-11-14 RX ORDER — MAGNESIUM HYDROXIDE 1200 MG/15ML
LIQUID ORAL AS NEEDED
Status: DISCONTINUED | OUTPATIENT
Start: 2017-11-14 | End: 2017-11-14 | Stop reason: HOSPADM

## 2017-11-14 RX ORDER — DIPHENHYDRAMINE HCL 25 MG
50 CAPSULE ORAL EVERY 6 HOURS PRN
Status: DISCONTINUED | OUTPATIENT
Start: 2017-11-14 | End: 2017-11-16 | Stop reason: HOSPADM

## 2017-11-14 RX ORDER — ONDANSETRON 2 MG/ML
4 INJECTION INTRAMUSCULAR; INTRAVENOUS ONCE AS NEEDED
Status: DISCONTINUED | OUTPATIENT
Start: 2017-11-14 | End: 2017-11-14 | Stop reason: HOSPADM

## 2017-11-14 RX ORDER — SODIUM CHLORIDE, SODIUM LACTATE, POTASSIUM CHLORIDE, CALCIUM CHLORIDE 600; 310; 30; 20 MG/100ML; MG/100ML; MG/100ML; MG/100ML
INJECTION, SOLUTION INTRAVENOUS CONTINUOUS PRN
Status: DISCONTINUED | OUTPATIENT
Start: 2017-11-14 | End: 2017-11-14 | Stop reason: SURG

## 2017-11-14 RX ORDER — CEFAZOLIN SODIUM 2 G/100ML
2 INJECTION, SOLUTION INTRAVENOUS ONCE
Status: COMPLETED | OUTPATIENT
Start: 2017-11-14 | End: 2017-11-14

## 2017-11-14 RX ORDER — SUCCINYLCHOLINE CHLORIDE 20 MG/ML
INJECTION INTRAMUSCULAR; INTRAVENOUS AS NEEDED
Status: DISCONTINUED | OUTPATIENT
Start: 2017-11-14 | End: 2017-11-14 | Stop reason: SURG

## 2017-11-14 RX ORDER — PROMETHAZINE HYDROCHLORIDE 25 MG/1
12.5 TABLET ORAL ONCE AS NEEDED
Status: DISCONTINUED | OUTPATIENT
Start: 2017-11-14 | End: 2017-11-14 | Stop reason: HOSPADM

## 2017-11-14 RX ADMIN — SODIUM CHLORIDE, POTASSIUM CHLORIDE, SODIUM LACTATE AND CALCIUM CHLORIDE 9 ML/HR: 600; 310; 30; 20 INJECTION, SOLUTION INTRAVENOUS at 12:32

## 2017-11-14 RX ADMIN — MIDAZOLAM 2 MG: 1 INJECTION INTRAMUSCULAR; INTRAVENOUS at 12:32

## 2017-11-14 RX ADMIN — FENTANYL CITRATE 50 MCG: 50 INJECTION, SOLUTION INTRAMUSCULAR; INTRAVENOUS at 14:07

## 2017-11-14 RX ADMIN — HYDROMORPHONE HYDROCHLORIDE 0.5 MG: 2 INJECTION, SOLUTION INTRAMUSCULAR; INTRAVENOUS; SUBCUTANEOUS at 14:51

## 2017-11-14 RX ADMIN — LISINOPRIL 20 MG: 20 TABLET ORAL at 19:23

## 2017-11-14 RX ADMIN — HYDRALAZINE HYDROCHLORIDE 5 MG: 20 INJECTION INTRAMUSCULAR; INTRAVENOUS at 16:36

## 2017-11-14 RX ADMIN — FENTANYL CITRATE 50 MCG: 50 INJECTION, SOLUTION INTRAMUSCULAR; INTRAVENOUS at 14:15

## 2017-11-14 RX ADMIN — MUPIROCIN: 20 OINTMENT TOPICAL at 19:23

## 2017-11-14 RX ADMIN — ASPIRIN 325 MG: 325 TABLET, DELAYED RELEASE ORAL at 22:11

## 2017-11-14 RX ADMIN — MIDAZOLAM 2 MG: 1 INJECTION INTRAMUSCULAR; INTRAVENOUS at 14:04

## 2017-11-14 RX ADMIN — PROPOFOL 50 MG: 10 INJECTION, EMULSION INTRAVENOUS at 14:31

## 2017-11-14 RX ADMIN — HYDROMORPHONE HYDROCHLORIDE 0.5 MG: 2 INJECTION, SOLUTION INTRAMUSCULAR; INTRAVENOUS; SUBCUTANEOUS at 14:39

## 2017-11-14 RX ADMIN — FAMOTIDINE 20 MG: 10 INJECTION, SOLUTION INTRAVENOUS at 12:32

## 2017-11-14 RX ADMIN — Medication: at 15:50

## 2017-11-14 RX ADMIN — FENTANYL CITRATE 50 MCG: 50 INJECTION, SOLUTION INTRAMUSCULAR; INTRAVENOUS at 14:31

## 2017-11-14 RX ADMIN — CEFAZOLIN SODIUM 2 G: 2 INJECTION, SOLUTION INTRAVENOUS at 14:04

## 2017-11-14 RX ADMIN — HYDROMORPHONE HYDROCHLORIDE 0.5 MG: 2 INJECTION, SOLUTION INTRAMUSCULAR; INTRAVENOUS; SUBCUTANEOUS at 14:35

## 2017-11-14 RX ADMIN — SODIUM CHLORIDE, POTASSIUM CHLORIDE, SODIUM LACTATE AND CALCIUM CHLORIDE 100 ML/HR: 600; 310; 30; 20 INJECTION, SOLUTION INTRAVENOUS at 22:12

## 2017-11-14 RX ADMIN — HYDROMORPHONE HYDROCHLORIDE 0.5 MG: 1 INJECTION, SOLUTION INTRAMUSCULAR; INTRAVENOUS; SUBCUTANEOUS at 16:22

## 2017-11-14 RX ADMIN — DEXAMETHASONE SODIUM PHOSPHATE 8 MG: 10 INJECTION INTRAMUSCULAR; INTRAVENOUS at 14:32

## 2017-11-14 RX ADMIN — FENTANYL CITRATE 50 MCG: 50 INJECTION INTRAMUSCULAR; INTRAVENOUS at 16:17

## 2017-11-14 RX ADMIN — SUCCINYLCHOLINE CHLORIDE 140 MG: 20 INJECTION, SOLUTION INTRAMUSCULAR; INTRAVENOUS; PARENTERAL at 15:10

## 2017-11-14 RX ADMIN — FENTANYL CITRATE 50 MCG: 50 INJECTION, SOLUTION INTRAMUSCULAR; INTRAVENOUS at 14:24

## 2017-11-14 RX ADMIN — HYDROMORPHONE HYDROCHLORIDE 0.5 MG: 1 INJECTION, SOLUTION INTRAMUSCULAR; INTRAVENOUS; SUBCUTANEOUS at 16:35

## 2017-11-14 RX ADMIN — SODIUM CHLORIDE, POTASSIUM CHLORIDE, SODIUM LACTATE AND CALCIUM CHLORIDE: 600; 310; 30; 20 INJECTION, SOLUTION INTRAVENOUS at 13:15

## 2017-11-14 RX ADMIN — CEFAZOLIN SODIUM 2 G: 2 INJECTION, SOLUTION INTRAVENOUS at 16:26

## 2017-11-14 RX ADMIN — FENTANYL CITRATE 50 MCG: 50 INJECTION INTRAMUSCULAR; INTRAVENOUS at 16:45

## 2017-11-14 RX ADMIN — PROPOFOL 200 MG: 10 INJECTION, EMULSION INTRAVENOUS at 14:07

## 2017-11-14 RX ADMIN — DOCUSATE SODIUM 100 MG: 100 CAPSULE, LIQUID FILLED ORAL at 19:22

## 2017-11-14 RX ADMIN — FENTANYL CITRATE 50 MCG: 50 INJECTION, SOLUTION INTRAMUSCULAR; INTRAVENOUS at 15:01

## 2017-11-14 RX ADMIN — LIDOCAINE HYDROCHLORIDE 100 MG: 20 INJECTION, SOLUTION INFILTRATION; PERINEURAL at 14:07

## 2017-11-14 RX ADMIN — POLYETHYLENE GLYCOL 3350 17 G: 17 POWDER, FOR SOLUTION ORAL at 19:23

## 2017-11-14 NOTE — ANESTHESIA PROCEDURE NOTES
Airway  Urgency: elective    Date/Time: 11/14/2017 2:09 PM  Airway not difficult    General Information and Staff    Patient location during procedure: OR  Anesthesiologist: FABIOLA SMART  CRNA: GABRIELLA ROMEO    Indications and Patient Condition  Indications for airway management: airway protection    Preoxygenated: yes  MILS not maintained throughout  Mask difficulty assessment: 1 - vent by mask    Final Airway Details  Final airway type: supraglottic airway      Successful airway: classic  Size 5    Number of attempts at approach: 1    Additional Comments  Pre O2, SIAI

## 2017-11-14 NOTE — ANESTHESIA PREPROCEDURE EVALUATION
Anesthesia Evaluation     NPO Solid Status: > 8 hours  NPO Liquid Status: > 8 hours     Airway   Mallampati: III  Neck ROM: full  possible difficult intubation  Dental      Comment: caps    Pulmonary     breath sounds clear to auscultation  (+) a smoker Current Abstained day of surgery,   Cardiovascular     Rhythm: regular    (+) hypertension,       Neuro/Psych  GI/Hepatic/Renal/Endo    (+) obesity, morbid obesity,     Musculoskeletal     Abdominal   (+) obese,    Substance History      OB/GYN          Other   (+) arthritis                                     Anesthesia Plan    ASA 3     general     intravenous induction   Anesthetic plan and risks discussed with patient.

## 2017-11-14 NOTE — OP NOTE
Operative Note    Name: Urbano Mares  YOB: 1970  MRN: [id number]  BMI: Body mass index is 34.08 kg/(m^2).    DATE OF SURGERY: 11/14/2017    PREOPERATIVE DIAGNOSIS: left knee end-stage osteoarthritis    POSTOPERATIVE DIAGNOSIS: left knee end-stage osteoarthritis    PROCEDURE PERFORMED: left total knee replacement with Orondo navigation    SURGEON: Dr. Anthony Lujan    ASSISTANT: GERMÁN Green    IMPLANTS: Depuy PFC size 4L femoral component, size 4 tibial baseplate, 10mm polyethylene insert, 41 mm patellar button    Estimated Blood Loss: 100cc  Specimens : none  Complications: none  22 Modifier:  none    DESCRIPTION OF PROCEDURE: The patient was taken to the operating room and placed in the supine position. Preoperative antibiotics were administered. Surgical time out was performed. After adequate induction of anesthesia, the leg was prepped and draped in the usual sterile fashion.  Surgical time out was performed. The leg was exsanguinated with an Esmarch bandage and the tourniquet inflated to 250 mmHg. A midline incision was performed followed by a medial parapatellar arthrotomy. The patella was subluxed laterally.  A portion of the fat pad, ACL, and anterior horns of the meniscus were excised.  The Mister Mario navigation device was affixed and navigated. The distal cut was made. The femur was then sized with a sizing guide. The femoral cutting block was placed and all femoral cuts were performed. The proximal tibia was exposed. Orondo navigation protocol was accomplished.  11 millimeters were removed.  We used the extramedullary tibial cutting guide set for removal of 3mm of bone off the low side. The tibial cut was performed. The posterior horns of the menisci were excised. The posterior osteophytes were removed. Flexion extension blocks were then used to balance the knee. The tibial cut surface was then sized with the sizing templates and the tibial and femoral trial were then placed. The  tray was aligned with the middle third of the tubercle.    Attention was then placed to the patella. The patella was balanced to track centrally through range of motion.  It measured 24 and patella resurfacing was performed.   At this point all trial components were removed, the knee was copiously irrigated with pulsed lavage, and the knee was injected with anesthetic cocktail solution. The cut surfaces were then dried with clean lap sponges, and the components were cemented tibia, followed by femura. The knee was held in full extension and all excess cement was removed. The knee was held still until the cement had completely hardened. We then placed the trial polyethylene spacer which resulted in full extension and excellent flexion-extension balance. We placed the final polyethylene spacer.   The knee was then copiously irrigated with the full 3 liters. The tourniquet was then released. There was excellent hemostasis. We closed the knee in multiple layers in standard fashion. Sterile dressing were applied. At the end of the case, the sponge and needle counts were reported as being correct. There were no known complications. The patient was then transported to the recovery room.    Surgical assistant performed retraction, suction, hemostasis, and specific limb positioning and manipulation required for accuracy of joint placement, and assistance in wound closure and dressing application.       Anthony Lujan M.D.

## 2017-11-14 NOTE — PLAN OF CARE
Problem: Patient Care Overview (Adult)  Goal: Plan of Care Review  Outcome: Ongoing (interventions implemented as appropriate)    11/14/17 1212   Coping/Psychosocial Response Interventions   Plan Of Care Reviewed With patient   Patient Care Overview   Progress no change       Goal: Adult Individualization and Mutuality  Outcome: Ongoing (interventions implemented as appropriate)    11/14/17 1212   Individualization   Patient Specific Preferences call Urbano   Patient Specific Goals walk without pain   Patient Specific Interventions teach S&S of infection   Mutuality/Individual Preferences   What Anxieties, Fears or Concerns Do You Have About Your Health or Care? none   What Questions Do You Have About Your Health or Care? none   What Information Would Help Us Give You More Personalized Care? none       Goal: Discharge Needs Assessment  Outcome: Ongoing (interventions implemented as appropriate)    11/14/17 1156 11/14/17 1212   Discharge Needs Assessment   Concerns To Be Addressed --  denies needs/concerns at this time   Readmission Within The Last 30 Days --  no previous admission in last 30 days   Self-Care   Equipment Currently Used at Home --  cane, straight   Current Health   Anticipated Changes Related to Illness --  none   Living Environment   Transportation Available car;family or friend will provide --          Problem: Perioperative Period (Adult)  Goal: Signs and Symptoms of Listed Potential Problems Will be Absent or Manageable (Perioperative Period)  Outcome: Ongoing (interventions implemented as appropriate)    11/14/17 1212   Perioperative Period   Problems Assessed (Perioperative Period) pain;hypoxia/hypoxemia;situational response   Problems Present (Perioperative Period) pain

## 2017-11-14 NOTE — ANESTHESIA POSTPROCEDURE EVALUATION
"Patient: Urbano Mares    Procedure Summary     Date Anesthesia Start Anesthesia Stop Room / Location    11/14/17 1400 1546 [facility] / [facility]       Procedure Diagnosis Surgeon Provider    LEFT TOTAL KNEE ARTHROPLASTY WITH CECILY NAVIGATION (Left Knee) Arthritis of left knee  (Arthritis of left knee [M17.12]) MD Kolby Rivera MD          Anesthesia Type: general  Last vitals  BP   (!) 164/104 (11/14/17 1645)   Temp   37.3 °C (99.1 °F) (11/14/17 1645)   Pulse   77 (11/14/17 1645)   Resp   16 (11/14/17 1645)     SpO2   96 % (11/14/17 1645)     Post Anesthesia Care and Evaluation    Patient location during evaluation: bedside  Patient participation: complete - patient participated  Level of consciousness: awake and alert  Pain management: adequate  Airway patency: patent  Anesthetic complications: No anesthetic complications    Cardiovascular status: acceptable  Respiratory status: acceptable  Hydration status: acceptable    Comments: BP (!) 164/104  Pulse 77  Temp 37.3 °C (99.1 °F) (Oral)   Resp 16  Ht 72\" (182.9 cm)  Wt 251 lb 4.8 oz (114 kg)  SpO2 96%  BMI 34.08 kg/m2          "

## 2017-11-15 LAB
ANION GAP SERPL CALCULATED.3IONS-SCNC: 12.1 MMOL/L
BUN BLD-MCNC: 16 MG/DL (ref 6–20)
BUN/CREAT SERPL: 16.7 (ref 7–25)
CALCIUM SPEC-SCNC: 9.4 MG/DL (ref 8.6–10.5)
CHLORIDE SERPL-SCNC: 100 MMOL/L (ref 98–107)
CO2 SERPL-SCNC: 25.9 MMOL/L (ref 22–29)
CREAT BLD-MCNC: 0.96 MG/DL (ref 0.76–1.27)
GFR SERPL CREATININE-BSD FRML MDRD: 83 ML/MIN/1.73
GLUCOSE BLD-MCNC: 165 MG/DL (ref 65–99)
HCT VFR BLD AUTO: 41.4 % (ref 40.4–52.2)
HGB BLD-MCNC: 12.9 G/DL (ref 13.7–17.6)
POTASSIUM BLD-SCNC: 4.4 MMOL/L (ref 3.5–5.2)
SODIUM BLD-SCNC: 138 MMOL/L (ref 136–145)

## 2017-11-15 PROCEDURE — 85018 HEMOGLOBIN: CPT | Performed by: ORTHOPAEDIC SURGERY

## 2017-11-15 PROCEDURE — 80048 BASIC METABOLIC PNL TOTAL CA: CPT | Performed by: ORTHOPAEDIC SURGERY

## 2017-11-15 PROCEDURE — 97110 THERAPEUTIC EXERCISES: CPT

## 2017-11-15 PROCEDURE — G0008 ADMIN INFLUENZA VIRUS VAC: HCPCS | Performed by: ORTHOPAEDIC SURGERY

## 2017-11-15 PROCEDURE — 97161 PT EVAL LOW COMPLEX 20 MIN: CPT

## 2017-11-15 PROCEDURE — 85014 HEMATOCRIT: CPT | Performed by: ORTHOPAEDIC SURGERY

## 2017-11-15 PROCEDURE — 25010000003 CEFAZOLIN IN DEXTROSE 2-4 GM/100ML-% SOLUTION: Performed by: ORTHOPAEDIC SURGERY

## 2017-11-15 PROCEDURE — 99024 POSTOP FOLLOW-UP VISIT: CPT | Performed by: ORTHOPAEDIC SURGERY

## 2017-11-15 PROCEDURE — 25010000002 INFLUENZA VAC SUBUNIT QUAD 0.5 ML SUSPENSION PREFILLED SYRINGE: Performed by: ORTHOPAEDIC SURGERY

## 2017-11-15 PROCEDURE — 97150 GROUP THERAPEUTIC PROCEDURES: CPT

## 2017-11-15 PROCEDURE — 90661 CCIIV3 VAC ABX FR 0.5 ML IM: CPT | Performed by: ORTHOPAEDIC SURGERY

## 2017-11-15 RX ORDER — HYDROCODONE BITARTRATE AND ACETAMINOPHEN 7.5; 325 MG/1; MG/1
2 TABLET ORAL
Status: DISCONTINUED | OUTPATIENT
Start: 2017-11-15 | End: 2017-11-16 | Stop reason: HOSPADM

## 2017-11-15 RX ORDER — PSEUDOEPHEDRINE HCL 30 MG
100 TABLET ORAL 2 TIMES DAILY
Qty: 60 CAPSULE | Refills: 2 | Status: SHIPPED | OUTPATIENT
Start: 2017-11-15 | End: 2018-01-04

## 2017-11-15 RX ORDER — CHLORPROMAZINE HYDROCHLORIDE 25 MG/1
25 TABLET, FILM COATED ORAL 3 TIMES DAILY PRN
Status: DISCONTINUED | OUTPATIENT
Start: 2017-11-15 | End: 2017-11-16 | Stop reason: HOSPADM

## 2017-11-15 RX ORDER — BISACODYL 5 MG/1
10 TABLET, DELAYED RELEASE ORAL DAILY PRN
Qty: 30 TABLET | Refills: 3 | Status: SHIPPED | OUTPATIENT
Start: 2017-11-15 | End: 2018-01-04

## 2017-11-15 RX ORDER — HYDROCODONE BITARTRATE AND ACETAMINOPHEN 7.5; 325 MG/1; MG/1
TABLET ORAL
Qty: 100 TABLET | Refills: 0 | Status: SHIPPED | OUTPATIENT
Start: 2017-11-15 | End: 2017-11-29 | Stop reason: SDUPTHER

## 2017-11-15 RX ORDER — OXYCODONE AND ACETAMINOPHEN 7.5; 325 MG/1; MG/1
TABLET ORAL
Qty: 100 TABLET | Refills: 0 | Status: SHIPPED | OUTPATIENT
Start: 2017-11-15 | End: 2017-11-29 | Stop reason: ALTCHOICE

## 2017-11-15 RX ORDER — HYDROCODONE BITARTRATE AND ACETAMINOPHEN 7.5; 325 MG/1; MG/1
1 TABLET ORAL
Status: DISCONTINUED | OUTPATIENT
Start: 2017-11-15 | End: 2017-11-16 | Stop reason: HOSPADM

## 2017-11-15 RX ORDER — ONDANSETRON 4 MG/1
4 TABLET, ORALLY DISINTEGRATING ORAL EVERY 6 HOURS PRN
Qty: 20 TABLET | Refills: 2 | Status: SHIPPED | OUTPATIENT
Start: 2017-11-15 | End: 2018-01-04

## 2017-11-15 RX ORDER — CHLORPROMAZINE HYDROCHLORIDE 25 MG/1
25 TABLET, FILM COATED ORAL 3 TIMES DAILY PRN
Qty: 30 TABLET | Refills: 0 | Status: SHIPPED | OUTPATIENT
Start: 2017-11-15 | End: 2018-03-01

## 2017-11-15 RX ADMIN — HYDROCODONE BITARTRATE AND ACETAMINOPHEN 2 TABLET: 7.5; 325 TABLET ORAL at 15:55

## 2017-11-15 RX ADMIN — MUPIROCIN: 20 OINTMENT TOPICAL at 17:43

## 2017-11-15 RX ADMIN — POLYETHYLENE GLYCOL 3350 17 G: 17 POWDER, FOR SOLUTION ORAL at 08:15

## 2017-11-15 RX ADMIN — HYDROCODONE BITARTRATE AND ACETAMINOPHEN 2 TABLET: 7.5; 325 TABLET ORAL at 20:58

## 2017-11-15 RX ADMIN — POLYETHYLENE GLYCOL 3350 17 G: 17 POWDER, FOR SOLUTION ORAL at 17:43

## 2017-11-15 RX ADMIN — ASPIRIN 325 MG: 325 TABLET, DELAYED RELEASE ORAL at 08:16

## 2017-11-15 RX ADMIN — ASPIRIN 325 MG: 325 TABLET, DELAYED RELEASE ORAL at 17:43

## 2017-11-15 RX ADMIN — CEFAZOLIN SODIUM 2 G: 2 INJECTION, SOLUTION INTRAVENOUS at 00:09

## 2017-11-15 RX ADMIN — DOCUSATE SODIUM 100 MG: 100 CAPSULE, LIQUID FILLED ORAL at 08:16

## 2017-11-15 RX ADMIN — MUPIROCIN: 20 OINTMENT TOPICAL at 08:16

## 2017-11-15 RX ADMIN — A/SINGAPORE/GP1908/2015 IVR-180 (H1N1) (AN A/MICHIGAN/45/2015-LIKE VIRUS), A/SINGAPORE/GP2050/2015 (H3N2) (AN A/HONG KONG/4801/2014 - LIKE VIRUS), B/UTAH/9/2014 (A B/PHUKET/3073/2013-LIKE VIRUS), B/HONG KONG/259/2010 (A B/BRISBANE/60/08-LIKE VIRUS) 0.5 ML: 15; 15; 15; 15 INJECTION, SUSPENSION INTRAMUSCULAR at 08:21

## 2017-11-15 RX ADMIN — CEFAZOLIN SODIUM 2 G: 2 INJECTION, SOLUTION INTRAVENOUS at 08:16

## 2017-11-15 RX ADMIN — LISINOPRIL 20 MG: 20 TABLET ORAL at 08:16

## 2017-11-15 RX ADMIN — CHLORPROMAZINE HYDROCHLORIDE 25 MG: 25 TABLET, SUGAR COATED ORAL at 16:54

## 2017-11-15 RX ADMIN — DOCUSATE SODIUM 100 MG: 100 CAPSULE, LIQUID FILLED ORAL at 17:43

## 2017-11-15 RX ADMIN — OXYCODONE HYDROCHLORIDE AND ACETAMINOPHEN 2 TABLET: 7.5; 325 TABLET ORAL at 05:47

## 2017-11-15 NOTE — PLAN OF CARE
Problem: Patient Care Overview (Adult)  Goal: Plan of Care Review  Outcome: Ongoing (interventions implemented as appropriate)    11/14/17 2122   Coping/Psychosocial Response Interventions   Plan Of Care Reviewed With patient   Patient Care Overview   Progress no change   Outcome Evaluation   Outcome Summary/Follow up Plan s/p L TKA- awaqke and alert, tolerating fluids, pain controlled with PCA. B/P has been well controlled since apresilone in PACU. Discussed the need to continue to monitor due to hx of HTN.        Goal: Adult Individualization and Mutuality  Outcome: Ongoing (interventions implemented as appropriate)  Goal: Discharge Needs Assessment  Outcome: Ongoing (interventions implemented as appropriate)    Problem: Perioperative Period (Adult)  Goal: Signs and Symptoms of Listed Potential Problems Will be Absent or Manageable (Perioperative Period)  Outcome: Ongoing (interventions implemented as appropriate)    Problem: Fall Risk (Adult)  Goal: Identify Related Risk Factors and Signs and Symptoms  Outcome: Outcome(s) achieved Date Met:  11/14/17  Goal: Absence of Falls  Outcome: Ongoing (interventions implemented as appropriate)    Problem: Knee Replacement, Total (Adult)  Goal: Signs and Symptoms of Listed Potential Problems Will be Absent or Manageable (Knee Replacement, Total)  Outcome: Ongoing (interventions implemented as appropriate)

## 2017-11-15 NOTE — PLAN OF CARE
Problem: Patient Care Overview (Adult)  Goal: Plan of Care Review    11/15/17 0958   Outcome Evaluation   Outcome Summary/Follow up Plan Pt is POD1 L TKA. he was previously independent with mobility. He has a walker at home. Educated on home exercise program and proper use of walker. Will see to improve L knee ROM, strength, balance and endurance. Anticipate d/c home with [facility] when meidcally able.          Problem: Inpatient Physical Therapy  Goal: Bed Mobility Goal LTG- PT    11/15/17 0958   Bed Mobility PT LTG   Bed Mobility PT LTG, Date Established 11/15/17   Bed Mobility PT LTG, Time to Achieve 4 days   Bed Mobility PT LTG, Activity Type all bed mobility   Bed Mobility PT LTG, [place] Level independent       Goal: Transfer Training Goal 1 LTG- PT    11/15/17 0958   Transfer Training PT LTG   Transfer Training PT LTG, Date Established 11/15/17   Transfer Training PT LTG, Time to Achieve 4 days   Transfer Training PT LTG, Activity Type all transfers   Transfer Training PT LTG, [place] Level independent   Transfer Training PT LTG, Assist Device walker, rolling       Goal: Gait Training Goal LTG- PT    11/15/17 0958   Gait Training PT LTG   Gait Training Goal PT LTG, Date Established 11/15/17   Gait Training Goal PT LTG, Time to Achieve 4 days   Gait Training Goal PT LTG, [place] Level supervision required   Gait Training Goal PT LTG, Assist Device walker, rolling   Gait Training Goal PT LTG, Distance to Achieve 200       Goal: Stair Training Goal LTG- PT    11/15/17 0958   Stair Training PT LTG   Stair Training Goal PT LTG, Date Established 11/15/17   Stair Training Goal PT LTG, Time to Achieve 4 days   Stair Training Goal PT LTG, Number of Steps 12   Stair Training Goal PT LTG, [place] Level supervision required   Stair Training Goal PT LTG, Assist Device 2 handrails       Goal: Patient Education Goal LTG- PT    11/15/17 0958   Patient Education PT LTG   Patient Education PT LTG, Date  Established 11/15/17   Patient Education PT LTG, Time to Achieve 4 days   Patient Education PT LTG, Education Type HEP   Patient Education PT LTG, Education Understanding demonstrate adequately;verbalize understanding

## 2017-11-15 NOTE — PAYOR COMM NOTE
"UR CONTACT:     SHABBIR               P:  [phone]  F:  [phone]        Urbano Mares (47 y.o. Male)     Date of Birth Social Security Number Address Home Phone MRN    1970  [address]  [place] [id number] [phone] [id number]    Jainism Marital Status          Unknown        Admission Date Admission Type Admitting Provider Attending Provider Department, Room/Bed    17 Elective Anthony Lujan MD Makk, Stephen P, MD [address], P886/1    Discharge Date Discharge Disposition Discharge Destination                      Attending Provider: Anthony Lujan MD     Allergies:  No Known Allergies    Isolation:  None   Infection:  None   Code Status:  FULL    Ht:  72\" (182.9 cm)   Wt:  251 lb 4.8 oz (114 kg)    Admission Cmt:  None   Principal Problem:  Arthritis of left knee [M17.12]                 Active Insurance as of 2017     Primary Coverage     Payor Plan Insurance Group Employer/Plan Group    [employer] P[employer] [id number]     Payor Plan Address Payor Plan Phone Number Effective From Effective To    [address] [phone] 2017     [place] [address]       Subscriber Name Subscriber Birth Date Member ID       URMILA MARES 1966 [id number]                 Emergency Contacts      (Rel.) Home Phone Work Phone Mobile Phone    Urmila Mares (Spouse) -- -- [phone]            Lines, Drains & Airways    Active LDAs     Name:   Placement date:   Placement time:   Site:   Days:    Peripheral IV Line - Single Lumen 17 1215 metacarpal vein (top of hand), right 20 gauge  17    1215      1                Hospital Medications (active)       Dose Frequency Start End    acetaminophen (TYLENOL) tablet 325 mg 325 mg Every 4 Hours PRN 2017     Sig - Route: Take 1 tablet by mouth Every 4 (Four) Hours As Needed for Fever (greater than 101 F). - Oral    aspirin EC tablet 325 mg 325 mg " 2 Times Daily 11/14/2017     Sig - Route: Take 1 tablet by mouth 2 (Two) Times a Day. - Oral    bisacodyl (DULCOLAX) EC tablet 10 mg 10 mg Daily PRN 11/14/2017     Sig - Route: Take 2 tablets by mouth Daily As Needed for Constipation. - Oral    bisacodyl (DULCOLAX) suppository 10 mg 10 mg Daily PRN 11/14/2017     Sig - Route: Insert 1 suppository into the rectum Daily As Needed for Constipation. - Rectal    ceFAZolin in dextrose (ANCEF) 2-4 GM/100ML-% IVPB solution  - ADS Override Pull   11/14/2017 11/15/2017    Notes to Pharmacy: Created by cabinet override    ceFAZolin in dextrose (ANCEF) IVPB solution 2 g 2 g Every 8 Hours 11/14/2017 11/15/2017    Sig - Route: Infuse 100 mL into a venous catheter Every 8 (Eight) Hours. - Intravenous    ceFAZolin in dextrose (ANCEF) IVPB solution 2 g 2 g Once 11/14/2017 11/14/2017    Sig - Route: Infuse 100 mL into a venous catheter 1 (One) Time. - Intravenous    chlorproMAZINE (THORAZINE) tablet 25 mg 25 mg 3 Times Daily PRN 11/15/2017     Sig - Route: Take 1 tablet by mouth 3 (Three) Times a Day As Needed (hiccups). - Oral    diphenhydrAMINE (BENADRYL) capsule 50 mg 50 mg Every 6 Hours PRN 11/14/2017     Sig - Route: Take 2 capsules by mouth Every 6 (Six) Hours As Needed for Itching. - Oral    diphenhydrAMINE (BENADRYL) injection 25 mg 25 mg Every 6 Hours PRN 11/14/2017     Sig - Route: Infuse 0.5 mL into a venous catheter Every 6 (Six) Hours As Needed for Itching. - Intravenous    docusate sodium (COLACE) capsule 100 mg 100 mg 2 Times Daily 11/14/2017     Sig - Route: Take 1 capsule by mouth 2 (Two) Times a Day. - Oral    famotidine (PEPCID) injection 20 mg 20 mg Once 11/14/2017 11/14/2017    Sig - Route: Infuse 2 mL into a venous catheter 1 (One) Time. - Intravenous    HYDROcodone-acetaminophen (NORCO) 7.5-325 MG per tablet 1 tablet 1 tablet Every 3 Hours PRN 11/15/2017     Sig - Route: Take 1 tablet by mouth Every 3 (Three) Hours As Needed for Moderate Pain  (do not exceed  "4gm tylenol in 24hrs). - Oral    Linked Group 1:  \"Or\" Linked Group Details        HYDROcodone-acetaminophen (NORCO) 7.5-325 MG per tablet 2 tablet 2 tablet Every 3 Hours PRN 11/15/2017     Sig - Route: Take 2 tablets by mouth Every 3 (Three) Hours As Needed for Severe Pain  (do not exceed 4gm tylenol in 24hrs). - Oral    Linked Group 1:  \"Or\" Linked Group Details        HYDROmorphone (DILAUDID) PCA 0.2 mg/ml 50 mL syringe  Continuous 11/14/2017 11/28/2017    Sig - Route: Infuse  into a venous catheter Continuous. - Intravenous    Influenza Vac Subunit Quad (FLUCELVAX) injection 0.5 mL 0.5 mL During Hospitalization 11/14/2017 11/15/2017    Sig - Route: Inject 0.5 mL into the shoulder, thigh, or buttocks During Hospitalization for Immunization. - Intramuscular    Cosign for Ordering: Accepted by Anthony Lujan MD on 11/15/2017  8:52 AM    lactated ringers infusion 100 mL/hr Continuous 11/14/2017 11/15/2017    Sig - Route: Infuse 100 mL/hr into a venous catheter Continuous. - Intravenous    lactated ringers infusion 9 mL/hr Continuous 11/14/2017     Sig - Route: Infuse 9 mL/hr into a venous catheter Continuous. - Intravenous    lisinopril (PRINIVIL,ZESTRIL) tablet 20 mg 20 mg Daily 11/14/2017     Sig - Route: Take 1 tablet by mouth Daily. - Oral    magnesium hydroxide (MILK OF MAGNESIA) suspension 2400 mg/10mL 10 mL 10 mL Daily PRN 11/14/2017     Sig - Route: Take 10 mL by mouth Daily As Needed for Constipation. - Oral    mupirocin (BACTROBAN) 2 % nasal ointment  2 Times Daily 11/14/2017 11/16/2017    Sig - Route: by Each Nare route 2 (Two) Times a Day. - Each Nare    naloxone (NARCAN) injection 0.1 mg 0.1 mg Every 5 Minutes PRN 11/14/2017     Sig - Route: Infuse 0.25 mL into a venous catheter Every 5 (Five) Minutes As Needed for Opioid Reversal or Respiratory Depression (see administration instructions). - Intravenous    ondansetron (ZOFRAN) injection 4 mg 4 mg Every 6 Hours PRN 11/14/2017     Sig - Route: Infuse 2 " "mL into a venous catheter Every 6 (Six) Hours As Needed for Nausea or Vomiting. - Intravenous    Linked Group 2:  \"Or\" Linked Group Details        ondansetron (ZOFRAN) tablet 4 mg 4 mg Every 6 Hours PRN 11/14/2017     Sig - Route: Take 1 tablet by mouth Every 6 (Six) Hours As Needed for Nausea or Vomiting. - Oral    Linked Group 2:  \"Or\" Linked Group Details        ondansetron ODT (ZOFRAN-ODT) disintegrating tablet 4 mg 4 mg Every 6 Hours PRN 11/14/2017     Sig - Route: Take 1 tablet by mouth Every 6 (Six) Hours As Needed for Nausea or Vomiting. - Oral    Linked Group 2:  \"Or\" Linked Group Details        polyethylene glycol 3350 powder (packet) 17 g 2 Times Daily 11/14/2017     Sig - Route: Take 17 g by mouth 2 (Two) Times a Day. - Oral    promethazine (PHENERGAN) tablet 12.5 mg 12.5 mg Every 6 Hours PRN 11/14/2017     Sig - Route: Take 1 tablet by mouth Every 6 (Six) Hours As Needed for Nausea or Vomiting. - Oral    ropivacaine (NAROPIN) 50 mL, Morphine (PF) 5 mg, ketorolac (TORADOL) 30 mg, EPINEPHrine PF (ADRENALIN) 0.3 mg in sodium chloride 0.9 % 101.8 mL  Once 11/14/2017 11/14/2017    Sig - Route: Inject  as directed 1 (One) Time. - Injection    albuterol (PROVENTIL) nebulizer solution 0.083% 2.5 mg/3mL (Discontinued) 2.5 mg Once As Needed 11/14/2017 11/14/2017    Sig - Route: Take 2.5 mg by nebulization 1 (One) Time As Needed for Wheezing or Shortness of Air (bronchospasm). - Nebulization    Reason for Discontinue: Patient Transfer    ceFAZolin (ANCEF) 2 g in sodium chloride 0.9 % 100 mL IVPB (Discontinued) 2 g Once 11/14/2017 11/14/2017    Sig - Route: Infuse 2 g into a venous catheter 1 (One) Time. - Intravenous    dexamethasone (DECADRON) injection (Discontinued)  As Needed 11/14/2017 11/14/2017    Sig - Route: Infuse  into a venous catheter As Needed. - Intravenous    Reason for Discontinue: Anesthesia Stop    diphenhydrAMINE (BENADRYL) injection 12.5 mg (Discontinued) 12.5 mg Every 15 Minutes PRN " 11/14/2017 11/14/2017    Sig - Route: Infuse 0.25 mL into a venous catheter Every 15 (Fifteen) Minutes As Needed for Itching (May repeat x 1). - Intravenous    Reason for Discontinue: Patient Transfer    ePHEDrine injection 5 mg (Discontinued) 5 mg Once As Needed 11/14/2017 11/14/2017    Sig - Route: Infuse 0.1 mL into a venous catheter 1 (One) Time As Needed (symptomatic hypotension - Notify attending anesthesiologist if this needs to be given). - Intravenous    Reason for Discontinue: Patient Transfer    fentaNYL citrate (PF) (SUBLIMAZE) injection 50 mcg (Discontinued) 50 mcg Every 10 Minutes PRN 11/14/2017 11/14/2017    Sig - Route: Infuse 1 mL into a venous catheter Every 10 (Ten) Minutes As Needed for Severe Pain . - Intravenous    Reason for Discontinue: Patient Transfer    fentaNYL citrate (PF) (SUBLIMAZE) injection 50 mcg (Discontinued) 50 mcg Every 5 Minutes PRN 11/14/2017 11/14/2017    Sig - Route: Infuse 1 mL into a venous catheter Every 5 (Five) Minutes As Needed for Moderate Pain  or Severe Pain . - Intravenous    Reason for Discontinue: Patient Transfer    flumazenil (ROMAZICON) injection 0.2 mg (Discontinued) 0.2 mg As Needed 11/14/2017 11/14/2017    Sig - Route: Infuse 2 mL into a venous catheter As Needed (for benzodiazepine induced unresponsiveness or sedation). - Intravenous    Reason for Discontinue: Patient Transfer    hydrALAZINE (APRESOLINE) injection 5 mg (Discontinued) 5 mg Every 10 Minutes PRN 11/14/2017 11/14/2017    Sig - Route: Infuse 0.25 mL into a venous catheter Every 10 (Ten) Minutes As Needed for High Blood Pressure (for systolic blood pressure greater than 180 mmHg or diastolic blood pressure greater than 105 mmHg). - Intravenous    Reason for Discontinue: Patient Transfer    HYDROcodone-acetaminophen (NORCO) 7.5-325 MG per tablet 1 tablet (Discontinued) 1 tablet Once As Needed 11/14/2017 11/14/2017    Sig - Route: Take 1 tablet by mouth 1 (One) Time As Needed for Mild Pain . -  "Oral    Reason for Discontinue: Patient Transfer    HYDROmorphone (DILAUDID) injection 0.5 mg (Discontinued) 0.5 mg Every 5 Minutes PRN 11/14/2017 11/14/2017    Sig - Route: Infuse 0.5 mL into a venous catheter Every 5 (Five) Minutes As Needed for Moderate Pain  or Severe Pain . - Intravenous    Reason for Discontinue: Patient Transfer    HYDROmorphone (DILAUDID) injection (Discontinued)  As Needed 11/14/2017 11/14/2017    Sig: As Needed for Severe Pain .    Reason for Discontinue: Anesthesia Stop    labetalol (NORMODYNE,TRANDATE) injection 5 mg (Discontinued) 5 mg Every 5 Minutes PRN 11/14/2017 11/14/2017    Sig - Route: Infuse 1 mL into a venous catheter Every 5 (Five) Minutes As Needed for High Blood Pressure (for systolic blood pressure greater than 180 mmHg or diastolic blood pressure greater than 105 mmHg). - Intravenous    Reason for Discontinue: Patient Transfer    lactated ringers infusion (Discontinued)  Continuous PRN 11/14/2017 11/14/2017    Sig - Route: Infuse  into a venous catheter Continuous As Needed. - Intravenous    Reason for Discontinue: Anesthesia Stop    lidocaine (XYLOCAINE) 2% injection (Discontinued)  As Needed 11/14/2017 11/14/2017    Sig: As Needed.    Reason for Discontinue: Anesthesia Stop    meperidine (DEMEROL) injection 12.5 mg (Discontinued) 12.5 mg Every 5 Minutes PRN 11/14/2017 11/14/2017    Sig - Route: Infuse 0.25 mL into a venous catheter Every 5 (Five) Minutes As Needed for Shivering (May repeat x 1). - Intravenous    Reason for Discontinue: Patient Transfer    midazolam (VERSED) injection 1 mg (Discontinued) 1 mg Every 5 Minutes PRN 11/14/2017 11/14/2017    Sig - Route: Infuse 1 mL into a venous catheter Every 5 (Five) Minutes As Needed for Anxiety (Max 4mg midazolam TOTAL). - Intravenous    Reason for Discontinue: Patient Transfer    Linked Group 3:  \"Or\" Linked Group Details        midazolam (VERSED) injection 1 mg (Discontinued) 1 mg Every 5 Minutes PRN 11/14/2017 " "11/14/2017    Sig - Route: Infuse 1 mL into a venous catheter Every 5 (Five) Minutes As Needed for Anxiety. - Intravenous    Reason for Discontinue: Patient Transfer    midazolam (VERSED) injection 2 mg (Discontinued) 2 mg Every 5 Minutes PRN 11/14/2017 11/14/2017    Sig - Route: Infuse 2 mL into a venous catheter Every 5 (Five) Minutes As Needed for Anxiety (Max 4mg midazolam TOTAL). - Intravenous    Reason for Discontinue: Patient Transfer    Linked Group 3:  \"Or\" Linked Group Details        naloxone (NARCAN) injection 0.2 mg (Discontinued) 0.2 mg As Needed 11/14/2017 11/14/2017    Sig - Route: Infuse 0.5 mL into a venous catheter As Needed for Opioid Reversal or Respiratory Depression (unresponsiveness, decrease oxygen saturation). - Intravenous    Reason for Discontinue: Patient Transfer    ondansetron (ZOFRAN) injection 4 mg (Discontinued) 4 mg Once As Needed 11/14/2017 11/14/2017    Sig - Route: Infuse 2 mL into a venous catheter 1 (One) Time As Needed for Nausea or Vomiting. - Intravenous    Reason for Discontinue: Patient Transfer    oxyCODONE-acetaminophen (PERCOCET) 7.5-325 MG per tablet 1 tablet (Discontinued) 1 tablet Every 3 Hours PRN 11/14/2017 11/15/2017    Sig - Route: Take 1 tablet by mouth Every 3 (Three) Hours As Needed for Moderate Pain . - Oral    oxyCODONE-acetaminophen (PERCOCET) 7.5-325 MG per tablet 1 tablet (Discontinued) 1 tablet Once As Needed 11/14/2017 11/14/2017    Sig - Route: Take 1 tablet by mouth 1 (One) Time As Needed for Moderate Pain . - Oral    Reason for Discontinue: Patient Transfer    oxyCODONE-acetaminophen (PERCOCET) 7.5-325 MG per tablet 2 tablet (Discontinued) 2 tablet Every 3 Hours PRN 11/14/2017 11/15/2017    Sig - Route: Take 2 tablets by mouth Every 3 (Three) Hours As Needed for Severe Pain . - Oral    promethazine (PHENERGAN) injection 12.5 mg (Discontinued) 12.5 mg Once As Needed 11/14/2017 11/14/2017    Sig - Route: Infuse 0.5 mL into a venous catheter 1 (One) " "Time As Needed for Nausea or Vomiting. - Intravenous    Reason for Discontinue: Patient Transfer    Linked Group 4:  \"Or\" Linked Group Details        promethazine (PHENERGAN) injection 12.5 mg (Discontinued) 12.5 mg Once As Needed 11/14/2017 11/14/2017    Sig - Route: Inject 0.5 mL into the shoulder, thigh, or buttocks 1 (One) Time As Needed for Nausea or Vomiting. - Intramuscular    Reason for Discontinue: Patient Transfer    Linked Group 4:  \"Or\" Linked Group Details        promethazine (PHENERGAN) suppository 25 mg (Discontinued) 25 mg Once As Needed 11/14/2017 11/14/2017    Sig - Route: Insert 1 suppository into the rectum 1 (One) Time As Needed for Nausea or Vomiting. - Rectal    Reason for Discontinue: Patient Transfer    Linked Group 4:  \"Or\" Linked Group Details        promethazine (PHENERGAN) tablet 12.5 mg (Discontinued) 12.5 mg Once As Needed 11/14/2017 11/14/2017    Sig - Route: Take 0.5 tablets by mouth 1 (One) Time As Needed for Nausea or Vomiting. - Oral    Reason for Discontinue: Patient Transfer    promethazine (PHENERGAN) tablet 25 mg (Discontinued) 25 mg Once As Needed 11/14/2017 11/14/2017    Sig - Route: Take 1 tablet by mouth 1 (One) Time As Needed for Nausea or Vomiting. - Oral    Reason for Discontinue: Patient Transfer    Linked Group 4:  \"Or\" Linked Group Details        Propofol (DIPRIVAN) injection (Discontinued)  As Needed 11/14/2017 11/14/2017    Sig - Route: Infuse  into a venous catheter As Needed. - Intravenous    Reason for Discontinue: Anesthesia Stop    sodium chloride (NS) irrigation solution (Discontinued)  As Needed 11/14/2017 11/14/2017    Sig: As Needed.    Reason for Discontinue: Patient Discharge    sodium chloride 0.9 % flush 1-10 mL (Discontinued) 1-10 mL As Needed 11/14/2017 11/14/2017    Sig - Route: Infuse 1-10 mL into a venous catheter As Needed for Line Care. - Intravenous    Reason for Discontinue: Patient Transfer    sodium chloride 3,000 mL with bacitracin 150,000 " Units irrigation (Discontinued)  As Needed 11/14/2017 11/14/2017    Sig: As Needed.    Reason for Discontinue: Patient Discharge    succinylcholine (ANECTINE) injection (Discontinued)  As Needed 11/14/2017 11/14/2017    Sig: As Needed.    Reason for Discontinue: Anesthesia Stop             Physician Progress Notes      Anthony Lujan MD at 11/15/2017  8:52 AM  Version 1 of 1             Orthopedic Total Joint Progress Note        Patient: Urbano Mares    Date of Admission: 11/14/2017 10:39 AM    YOB: 1970    Medical Record Number: [id number]    Attending Physician: Anthony Lujan MD      POD # 1 Day Post-Op Procedure(s) (LRB):  LEFT TOTAL KNEE ARTHROPLASTY WITH CECILY NAVIGATION (Left)       Systemic or Specific Complaints: No Complaints      Allergies: No Known Allergies    Medications:   Current Medications:  Scheduled Meds:  aspirin 325 mg Oral BID   docusate sodium 100 mg Oral BID   lisinopril 20 mg Oral Daily   mupirocin  Each Nare BID   polyethylene glycol 17 g Oral BID     Continuous Infusions:  HYDROmorphone HCl-NaCl     lactated ringers 100 mL/hr Last Rate: 100 mL/hr (11/14/17 2212)   lactated ringers 9 mL/hr Last Rate: 9 mL/hr (11/14/17 1232)     PRN Meds:.•  acetaminophen  •  bisacodyl  •  bisacodyl  •  diphenhydrAMINE  •  diphenhydrAMINE  •  magnesium hydroxide  •  naloxone  •  ondansetron **OR** ondansetron ODT **OR** ondansetron  •  oxyCODONE-acetaminophen  •  oxyCODONE-acetaminophen  •  promethazine      Physical Exam: 47 y.o. male Body mass index is 34.08 kg/(m^2).  General Appearance:    alert and oriented            Pain Relief: Patient reports good relief Vitals:    11/14/17 2004 11/14/17 2246 11/15/17 0329 11/15/17 0715   BP: 125/69 125/70 116/66 106/61   BP Location: Left arm Right arm Right arm Right arm   Patient Position: Lying Lying Lying Lying   Pulse: 79 88 81 79   Resp: 16 16 16 16   Temp: 97.9 °F (36.6 °C) 98.9 °F (37.2 °C) 97.1 °F (36.2 °C) 98.1 °F (36.7 °C)    TempSrc: Oral Oral Oral Oral   SpO2: 95% 98% 96% 98%   Weight:       Height:              HEENT:    Normocephalic, without obvious abnormality, atraumatic.          PERRLA; EOMI; Neck supple with trachea midline. No JVD.       Lungs:     Respirations regular, even and unlabored      Heart:    Regular rhythm and normal rate.   Abdomen:     Normal bowel sounds, soft non-tender, non-distended       Extremities:   Operative extremity neurovascular status intact. ROM intact.    Incision intact w/out signs or symptoms of infection.  No cyanosis, no calf tenderness     Pulses:     Pulses palpable and equal bilaterally     Skin:     Skin Warm/Dry w/out cyanosis     Activity: Mobilizing Per P.T.   Weight Bearing: As Tolerated    Diagnostic Tests:   Admission on 11/14/2017   Component Date Value Ref Range Status   • Glucose 11/15/2017 165* 65 - 99 mg/dL Final   • BUN 11/15/2017 16  6 - 20 mg/dL Final   • Creatinine 11/15/2017 0.96  0.76 - 1.27 mg/dL Final   • Sodium 11/15/2017 138  136 - 145 mmol/L Final   • Potassium 11/15/2017 4.4  3.5 - 5.2 mmol/L Final   • Chloride 11/15/2017 100  98 - 107 mmol/L Final   • CO2 11/15/2017 25.9  22.0 - 29.0 mmol/L Final   • Calcium 11/15/2017 9.4  8.6 - 10.5 mg/dL Final   • eGFR Non African Amer 11/15/2017 83  >60 mL/min/1.73 Final   • BUN/Creatinine Ratio 11/15/2017 16.7  7.0 - 25.0 Final   • Anion Gap 11/15/2017 12.1  mmol/L Final   • Hemoglobin 11/15/2017 12.9* 13.7 - 17.6 g/dL Final   • Hematocrit 11/15/2017 41.4  40.4 - 52.2 % Final       Xr Knee 1 Or 2 View Left    Result Date: 11/14/2017  Narrative: 2 VIEW PORTABLE LEFT KNEE  HISTORY: Knee replacement for osteoarthritis.  FINDINGS: The patient has had recent total knee replacement and the alignment appears satisfactory.  This report was finalized on 11/14/2017 5:57 PM by Dr. Fadi Garay MD.        Personally viewed ortho images and report     Assessment:  Doing well POD  # 1 Day Post-Op following total joint replacement  Acute  Blood Loss Anemia, stable  Post-operative Pain  Limited mobility, requires use of walker and assistance when OOB.    Patient Active Problem List   Diagnosis   • Chronic pain of both knees   • Arthritis of left knee   • Arthritis of right knee        Plan:  Continue to monitor labs and/or v/s, for tolerance to post op blood loss.  Continue efforts to increase mobilization.  Continue Pain Control Measures.  Continue incisional Care.  DVT prophylaxis.  Follow up in office with Anthony Lujan M.D. In 2 weeks.    Discharge Plan:POD 2-3 to home and home health    Date: 11/15/2017  Anthony Lujan MD             Electronically signed by Anthony Lujan MD at 11/15/2017  8:52 AM

## 2017-11-15 NOTE — PROGRESS NOTES
Orthopedic Total Joint Progress Note        Patient: Urbano Mares    Date of Admission: 11/14/2017 10:39 AM    YOB: 1970    Medical Record Number: [id number]    Attending Physician: Anthony Lujan MD      POD # 1 Day Post-Op Procedure(s) (LRB):  LEFT TOTAL KNEE ARTHROPLASTY WITH CECILY NAVIGATION (Left)       Systemic or Specific Complaints: No Complaints      Allergies: No Known Allergies    Medications:   Current Medications:  Scheduled Meds:  aspirin 325 mg Oral BID   docusate sodium 100 mg Oral BID   lisinopril 20 mg Oral Daily   mupirocin  Each Nare BID   polyethylene glycol 17 g Oral BID     Continuous Infusions:  HYDROmorphone HCl-NaCl     lactated ringers 100 mL/hr Last Rate: 100 mL/hr (11/14/17 2212)   lactated ringers 9 mL/hr Last Rate: 9 mL/hr (11/14/17 1232)     PRN Meds:.•  acetaminophen  •  bisacodyl  •  bisacodyl  •  diphenhydrAMINE  •  diphenhydrAMINE  •  magnesium hydroxide  •  naloxone  •  ondansetron **OR** ondansetron ODT **OR** ondansetron  •  oxyCODONE-acetaminophen  •  oxyCODONE-acetaminophen  •  promethazine      Physical Exam: 47 y.o. male Body mass index is 34.08 kg/(m^2).  General Appearance:    alert and oriented            Pain Relief: Patient reports good relief Vitals:    11/14/17 2004 11/14/17 2246 11/15/17 0329 11/15/17 0715   BP: 125/69 125/70 116/66 106/61   BP Location: Left arm Right arm Right arm Right arm   Patient Position: Lying Lying Lying Lying   Pulse: 79 88 81 79   Resp: 16 16 16 16   Temp: 97.9 °F (36.6 °C) 98.9 °F (37.2 °C) 97.1 °F (36.2 °C) 98.1 °F (36.7 °C)   TempSrc: Oral Oral Oral Oral   SpO2: 95% 98% 96% 98%   Weight:       Height:              HEENT:    Normocephalic, without obvious abnormality, atraumatic.          PERRLA; EOMI; Neck supple with trachea midline. No JVD.       Lungs:     Respirations regular, even and unlabored      Heart:    Regular rhythm and normal rate.   Abdomen:     Normal bowel sounds, soft non-tender, non-distended        Extremities:   Operative extremity neurovascular status intact. ROM intact.    Incision intact w/out signs or symptoms of infection.  No cyanosis, no calf tenderness     Pulses:     Pulses palpable and equal bilaterally     Skin:     Skin Warm/Dry w/out cyanosis     Activity: Mobilizing Per P.T.   Weight Bearing: As Tolerated    Diagnostic Tests:   Admission on 11/14/2017   Component Date Value Ref Range Status   • Glucose 11/15/2017 165* 65 - 99 mg/dL Final   • BUN 11/15/2017 16  6 - 20 mg/dL Final   • Creatinine 11/15/2017 0.96  0.76 - 1.27 mg/dL Final   • Sodium 11/15/2017 138  136 - 145 mmol/L Final   • Potassium 11/15/2017 4.4  3.5 - 5.2 mmol/L Final   • Chloride 11/15/2017 100  98 - 107 mmol/L Final   • CO2 11/15/2017 25.9  22.0 - 29.0 mmol/L Final   • Calcium 11/15/2017 9.4  8.6 - 10.5 mg/dL Final   • eGFR Non African Amer 11/15/2017 83  >60 mL/min/1.73 Final   • BUN/Creatinine Ratio 11/15/2017 16.7  7.0 - 25.0 Final   • Anion Gap 11/15/2017 12.1  mmol/L Final   • Hemoglobin 11/15/2017 12.9* 13.7 - 17.6 g/dL Final   • Hematocrit 11/15/2017 41.4  40.4 - 52.2 % Final       Xr Knee 1 Or 2 View Left    Result Date: 11/14/2017  Narrative: 2 VIEW PORTABLE LEFT KNEE  HISTORY: Knee replacement for osteoarthritis.  FINDINGS: The patient has had recent total knee replacement and the alignment appears satisfactory.  This report was finalized on 11/14/2017 5:57 PM by Dr. Fadi Garay MD.        Personally viewed ortho images and report     Assessment:  Doing well POD  # 1 Day Post-Op following total joint replacement  Acute Blood Loss Anemia, stable  Post-operative Pain  Limited mobility, requires use of walker and assistance when OOB.    Patient Active Problem List   Diagnosis   • Chronic pain of both knees   • Arthritis of left knee   • Arthritis of right knee        Plan:  Continue to monitor labs and/or v/s, for tolerance to post op blood loss.  Continue efforts to increase mobilization.  Continue Pain  Control Measures.  Continue incisional Care.  DVT prophylaxis.  Follow up in office with Anthony Lujan M.D. In 2 weeks.    Discharge Plan:POD 2-3 to home and home health    Date: 11/15/2017  Anthony uLjan MD

## 2017-11-15 NOTE — THERAPY TREATMENT NOTE
Acute Care - Physical Therapy Treatment Note  [facility]     Patient Name: Urbano Mares  : 1970  MRN: [id number]  Today's Date: 11/15/2017  Onset of Illness/Injury or Date of Surgery Date:  (POD1 L TKA)     Referring Physician: Le    Admit Date: 2017    Visit Dx:    ICD-10-CM ICD-9-CM   1. Difficulty walking R26.2 719.7   2. Arthritis of left knee M17.12 716.96     Patient Active Problem List   Diagnosis   • Chronic pain of both knees   • Arthritis of left knee   • Arthritis of right knee               Adult Rehabilitation Note       11/15/17 1618          Rehab Assessment/Intervention    Discipline physical therapist  -MS      Document Type therapy note (daily note)  -MS      Subjective Information agree to therapy;complains of;fatigue;pain  -MS      Patient Effort, Rehab Treatment good  -MS      Symptoms Noted During/After Treatment fatigue;increased pain  -MS      Precautions/Limitations fall precautions  -MS      Recorded by [MS] Jimmie Breaux, PT      Pain Assessment    Pain Assessment 0-10  -MS      Pain Score 5  -MS      Post Pain Score 5  -MS      Pain Type Acute pain;Surgical pain  -MS      Pain Location Knee  -MS      Pain Orientation Left  -MS      Pain Intervention(s) Medication (See MAR);Cold applied;Repositioned;Elevated;Rest  -MS      Recorded by [MS] Jimmie Breaux PT      ROM (Range of Motion)    General ROM Detail Left knee AROM (14, 90)  -MS      Recorded by [MS] Jimmie Breaux, PT      Bed Mobility, Assessment/Treatment    Bed Mobility, Comment Up in chair for PM gym  -MS      Recorded by [MS] Jimmie Breaux PT      Transfer Assessment/Treatment    Transfers, Sit-Stand [place] contact guard assist  -MS      Transfers, Stand-Sit [place] contact guard assist  -MS      Transfers, Sit-Stand-Sit, Assist Device rolling walker  -MS      Recorded by [MS] Jimmie Breaux PT      Gait Assessment/Treatment    Gait, [place] Level contact guard assist  -MS       Gait, Assistive Device rolling walker  -MS      Gait, Distance (Feet) 140  -MS      Gait, Gait Pattern Analysis swing-through gait  -MS      Gait, Gait Deviations left:;antalgic;liban decreased  -MS      Recorded by [MS] Jimmie Breaux, PT      Therapy Exercises    Exercise Protocols total knee  -MS      Total Knee Exercises left:;15 reps;completed protocol  -MS      Recorded by [MS] Jimmie Breaux PT      Positioning and Restraints    Pre-Treatment Position sitting in chair/recliner  -MS      Post Treatment Position chair  -MS      In Chair notified nsg;reclined;sitting;call light within reach;encouraged to call for assist;with family/caregiver  -MS      Recorded by [MS] Jimmie Breaux PT        User Key  (r) = Recorded By, (t) = Taken By, (c) = Cosigned By    Initials Name Effective Dates    MS Jimmie ANABELA Breaux, PT 12/01/15 -                 IP PT Goals       11/15/17 0958          Bed Mobility PT LTG    Bed Mobility PT LTG, Date Established 11/15/17  -MG      Bed Mobility PT LTG, Time to Achieve 4 days  -MG      Bed Mobility PT LTG, Activity Type all bed mobility  -MG      Bed Mobility PT LTG, [place] Level independent  -MG      Transfer Training PT LTG    Transfer Training PT LTG, Date Established 11/15/17  -MG      Transfer Training PT LTG, Time to Achieve 4 days  -MG      Transfer Training PT LTG, Activity Type all transfers  -MG      Transfer Training PT LTG, [place] Level independent  -MG      Transfer Training PT LTG, Assist Device walker, rolling  -MG      Gait Training PT LTG    Gait Training Goal PT LTG, Date Established 11/15/17  -MG      Gait Training Goal PT LTG, Time to Achieve 4 days  -MG      Gait Training Goal PT LTG, [place] Level supervision required  -MG      Gait Training Goal PT LTG, Assist Device walker, rolling  -MG      Gait Training Goal PT LTG, Distance to Achieve 200  -MG      Stair Training PT LTG    Stair Training Goal PT LTG, Date Established 11/15/17  -MG       Stair Training Goal PT LTG, Time to Achieve 4 days  -MG      Stair Training Goal PT LTG, Number of Steps 12  -MG      Stair Training Goal PT LTG, [place] Level supervision required  -MG      Stair Training Goal PT LTG, Assist Device 2 handrails  -MG      Patient Education PT LTG    Patient Education PT LTG, Date Established 11/15/17  -MG      Patient Education PT LTG, Time to Achieve 4 days  -MG      Patient Education PT LTG, Education Type HEP  -MG      Patient Education PT LTG, Education Understanding demonstrate adequately;verbalize understanding  -MG        User Key  (r) = Recorded By, (t) = Taken By, (c) = Cosigned By    Initials Name Provider Type    MG Megan Gosselin, PT Physical Therapist          Physical Therapy Education     Title: PT OT SLP Therapies (Done)     Topic: Physical Therapy (Done)     Point: Mobility training (Done)    Learning Progress Summary    Learner Readiness Method Response Comment Documented by Status   Patient Acceptance E VU,NR  MG 11/15/17 0958 Done               Point: Home exercise program (Done)    Learning Progress Summary    Learner Readiness Method Response Comment Documented by Status   Patient Acceptance E VU,NR  MG 11/15/17 0958 Done               Point: Body mechanics (Done)    Learning Progress Summary    Learner Readiness Method Response Comment Documented by Status   Patient Acceptance E VU,NR  MG 11/15/17 0958 Done               Point: Precautions (Done)    Learning Progress Summary    Learner Readiness Method Response Comment Documented by Status   Patient Acceptance E VU,NR  MG 11/15/17 0958 Done                      User Key     Initials Effective Dates Name Provider Type Discipline    MG 09/13/17 -  Megan Gosselin, PT Physical Therapist PT                    PT Recommendation and Plan  Anticipated Equipment Needs At Discharge:  (has FWW)  Anticipated Discharge Disposition: home with home health  Planned Therapy Interventions: balance training, bed mobility  training, gait training, home exercise program, patient/family education, ROM (Range of Motion), strengthening, stair training, transfer training  PT Frequency: 2 times/day  Plan of Care Review  Plan Of Care Reviewed With: patient  Progress: improving  Outcome Summary/Follow up Plan: Improved tolerance to functional activity this PM with an increase in gait distance and progression of ther. ex. protocol          Outcome Measures       11/15/17 1600 11/15/17 1000       How much help from another person do you currently need...    Turning from your back to your side while in flat bed without using bedrails? 4  -MS 4  -MG     Moving from lying on back to sitting on the side of a flat bed without bedrails? 4  -MS 4  -MG     Moving to and from a bed to a chair (including a wheelchair)? 3  -MS 3  -MG     Standing up from a chair using your arms (e.g., wheelchair, bedside chair)? 3  -MS 3  -MG     Climbing 3-5 steps with a railing? 3  -MS 3  -MG     To walk in hospital room? 3  -MS 3  -MG     AM-PAC 6 Clicks Score 20  -MS 20  -MG     Functional Assessment    Outcome Measure Options AM-PAC 6 Clicks Basic Mobility (PT)  -MS AM-PAC 6 Clicks Basic Mobility (PT)  -MG       User Key  (r) = Recorded By, (t) = Taken By, (c) = Cosigned By    Initials Name Provider Type    MS Samuel ANABELA Breaux, PT Physical Therapist    MG Megan Gosselin, PT Physical Therapist           Time Calculation:         PT Charges       11/15/17 1620 11/15/17 1000       Time Calculation    Start Time 1500  -MS 0902  -MG     Stop Time 1538  -MS 0920  -MG     Time Calculation (min) 38 min  -MS 18 min  -MG     PT Received On 11/15/17  -MS 11/15/17  -MG     PT - Next Appointment 11/16/17  -MS 11/15/17  -MG     PT Goal Re-Cert Due Date  11/18/17  -MG       User Key  (r) = Recorded By, (t) = Taken By, (c) = Cosigned By    Initials Name Provider Type    MS Jimmie ANABELA Breaux, PT Physical Therapist    MG Megan Gosselin, PT Physical Therapist          Therapy Charges  for Today     Code Description Service Date Service Provider Modifiers Qty    42114370359 HC PT THER PROC EA 15 MIN 11/15/2017 Jimmie Breaux, PT GP 1    88221477525 HC PT THER PROC GROUP 11/15/2017 Jimmie Breaux, PT GP 1          PT G-Codes  Outcome Measure Options: AM-PAC 6 Clicks Basic Mobility (PT)    Jimmie Breaux, PT  11/15/2017

## 2017-11-15 NOTE — PLAN OF CARE
Problem: Patient Care Overview (Adult)  Goal: Plan of Care Review  Outcome: Ongoing (interventions implemented as appropriate)    11/15/17 0524   Coping/Psychosocial Response Interventions   Plan Of Care Reviewed With patient   Patient Care Overview   Progress improving   Outcome Evaluation   Outcome Summary/Follow up Plan Pt has done well since arrival from PACU. Ambulating well with walker use and staff assistance. Up in chair x1. Pain well controlled with CA and educated on oral meds PRN. Voiding adequately. Education provided on HTN management with BP monitoring.        Goal: Adult Individualization and Mutuality  Outcome: Ongoing (interventions implemented as appropriate)  Goal: Discharge Needs Assessment  Outcome: Ongoing (interventions implemented as appropriate)    Problem: Perioperative Period (Adult)  Goal: Signs and Symptoms of Listed Potential Problems Will be Absent or Manageable (Perioperative Period)  Outcome: Ongoing (interventions implemented as appropriate)    11/15/17 0524   Perioperative Period   Problems Assessed (Perioperative Period) all   Problems Present (Perioperative Period) pain         Problem: Fall Risk (Adult)  Goal: Absence of Falls  Outcome: Ongoing (interventions implemented as appropriate)    11/15/17 0524   Fall Risk (Adult)   Absence of Falls achieves outcome         Problem: Knee Replacement, Total (Adult)  Goal: Signs and Symptoms of Listed Potential Problems Will be Absent or Manageable (Knee Replacement, Total)  Outcome: Ongoing (interventions implemented as appropriate)    11/15/17 0524   Knee Replacement, Total   Problems Assessed (Total Knee Replacement) all   Problems Present (Total Knee Replacement) pain;decreased range of motion

## 2017-11-15 NOTE — PLAN OF CARE
Problem: Patient Care Overview (Adult)  Goal: Plan of Care Review    11/15/17 1620   Coping/Psychosocial Response Interventions   Plan Of Care Reviewed With patient   Patient Care Overview   Progress improving   Outcome Evaluation   Outcome Summary/Follow up Plan Improved tolerance to functional activity this PM with an increase in gait distance and progression of ther. ex. protocol

## 2017-11-15 NOTE — PROGRESS NOTES
Discharge Planning Assessment  [facility]     Patient Name: Urbano Mares  MRN: [id number]  Today's Date: 11/15/2017    Admit Date: 11/14/2017          Discharge Needs Assessment       11/15/17 1612    Living Environment    Lives With spouse    Living Arrangements house    Type of Financial/Environmental Concern none    Transportation Available car;family or friend will provide    Living Environment    Quality Of Family Relationships involved    Discharge Needs Assessment    Concerns To Be Addressed no discharge needs identified;denies needs/concerns at this time    Readmission Within The Last 30 Days no previous admission in last 30 days    Equipment Currently Used at Home none    Discharge Facility/Level Of Care Needs home with home health            Discharge Plan       11/15/17 1615    Case Management/Social Work Plan    Additional Comments S/w pt verified facesheet and d/c plan, pt plans to d/c home w/ the help of his spouse and [facility]. Nelia/[facility] notified and can accept.       11/15/17 1612    Case Management/Social Work Plan    Plan Home w/ [facility]         Discharge Placement     Facility/Agency Request Status Selected? Address Phone Number Fax Number    [facility] Accepted    Yes [address] [address] [phone]        Rosa Isela Gallardo RN 11/15/2017 16:14    11/15/2017 Nelia mei.                            Demographic Summary     None            Functional Status     None            Psychosocial     None            Abuse/Neglect     None            Legal     None            Substance Abuse     None            Patient Forms     None          Rosa Isela Gallardo RN

## 2017-11-15 NOTE — THERAPY EVALUATION
Acute Care - Physical Therapy Initial Evaluation  [facility]     Patient Name: Urbano Mares  : 1970  MRN: [id number]  Today's Date: 11/15/2017   Onset of Illness/Injury or Date of Surgery Date:  (POD1 L TKA)     Referring Physician: Le      Admit Date: 2017     Visit Dx:    ICD-10-CM ICD-9-CM   1. Difficulty walking R26.2 719.7   2. Arthritis of left knee M17.12 716.96     Patient Active Problem List   Diagnosis   • Chronic pain of both knees   • Arthritis of left knee   • Arthritis of right knee     Past Medical History:   Diagnosis Date   • Arthritis     OSTEO   • Hypertension    • Joint pain    • Left knee pain      Past Surgical History:   Procedure Laterality Date   • TOTAL KNEE ARTHROPLASTY Left 2017    Procedure: LEFT TOTAL KNEE ARTHROPLASTY WITH CECILY NAVIGATION;  Surgeon: Anthony Lujan MD;  Location: [facility] OR;  Service:    • WISDOM TOOTH EXTRACTION            PT ASSESSMENT (last 72 hours)      PT Evaluation       11/15/17 0900 17 1212    Rehab Evaluation    Document Type evaluation  -MG     Subjective Information no complaints;agree to therapy  -MG     Patient Effort, Rehab Treatment good  -MG     Symptoms Noted During/After Treatment none  -MG     General Information    Onset of Illness/Injury or Date of Surgery Date --   POD1 L TKA  -MG     Referring Physician Le  -MG     Precautions/Limitations fall precautions  -MG     Prior Level of Function independent:  -MG     Equipment Currently Used at Home none  -MG cane, straight  -DK    Plans/Goals Discussed With patient  -MG     Barriers to Rehab none identified  -MG     Living Environment    Living Environment Comment 3+3 stairs to enter. 13 stairs to basement (where bed is)  -MG     Clinical Impression    Patient/Family Goals Statement return home  -MG     Criteria for Skilled Therapeutic Interventions Met yes;treatment indicated  -MG     Impairments Found (describe specific impairments) aerobic  capacity/endurance;gait, locomotion, and balance;muscle performance;ROM  -MG     Rehab Potential good, to achieve stated therapy goals  -MG     Pain Assessment    Pain Assessment 0-10  -MG     Pain Score 2  -MG     Pain Type Surgical pain  -MG     Pain Location Knee  -MG     Pain Orientation Left  -MG     Pain Intervention(s) Repositioned;Ambulation/increased activity  -MG     Cognitive Assessment/Intervention    Current Cognitive/Communication Assessment functional  -MG     Orientation Status oriented x 4  -MG     Follows Commands/Answers Questions 100% of the time  -MG     Personal Safety WNL/WFL  -MG     Personal Safety Interventions fall prevention program maintained;gait belt  -MG     ROM (Range of Motion)    General ROM --   0-50  -MG     MMT (Manual Muscle Testing)    General MMT Assessment --   post op L knee weakness  -MG     Bed Mobility, Assessment/Treatment    Bed Mob, Supine to Sit, [place] supervision required  -MG     Bed Mob, Sit to Supine, [place] supervision required  -MG     Transfer Assessment/Treatment    Transfers, Sit-Stand [place] contact guard assist  -MG     Transfers, Stand-Sit [place] contact guard assist  -MG     Transfers, Sit-Stand-Sit, Assist Device rolling walker  -MG     Transfer, Safety Issues balance decreased during turns;step length decreased  -MG     Transfer, Impairments impaired balance;strength decreased  -MG     Gait Assessment/Treatment    Gait, [place] Level contact guard assist  -MG     Gait, Assistive Device rolling walker  -MG     Gait, Distance (Feet) 120  -MG     Gait, Gait Pattern Analysis swing-to gait  -MG     Gait, Gait Deviations liban decreased;step length decreased  -MG     Gait, Comment cues for step length and walker use  -MG     Therapy Exercises    Exercise Protocols total knee  -MG     Total Knee Exercises left:;10 reps;completed protocol  -MG     Positioning and Restraints    Pre-Treatment Position in bed  -MG     Post  Treatment Position chair  -MG     In Chair reclined;call light within reach;encouraged to call for assist  -       11/14/17 1674       General Information    Equipment Currently Used at Home none  -DK     Living Environment    Lives With spouse  -DK     Living Arrangements house  -DK     Home Accessibility stairs (1 railing present)  -DK     Stair Railings at Home inside, present at both sides  -DK     Type of Financial/Environmental Concern none  -DK     Transportation Available car;family or friend will provide  -DK       User Key  (r) = Recorded By, (t) = Taken By, (c) = Cosigned By    Initials Name Provider Type    DK Ivana Nelson, RN Registered Nurse    MG Megan Gosselin, PT Physical Therapist          Physical Therapy Education     Title: PT OT SLP Therapies (Done)     Topic: Physical Therapy (Done)     Point: Mobility training (Done)    Learning Progress Summary    Learner Readiness Method Response Comment Documented by Status   Patient Acceptance E VU,NR   11/15/17 0958 Done               Point: Home exercise program (Done)    Learning Progress Summary    Learner Readiness Method Response Comment Documented by Status   Patient Acceptance E VU,NR  MG 11/15/17 0958 Done               Point: Body mechanics (Done)    Learning Progress Summary    Learner Readiness Method Response Comment Documented by Status   Patient Acceptance E VU,NR  MG 11/15/17 0958 Done               Point: Precautions (Done)    Learning Progress Summary    Learner Readiness Method Response Comment Documented by Status   Patient Acceptance E VU,NR  MG 11/15/17 0958 Done                      User Key     Initials Effective Dates Name Provider Type Discipline     09/13/17 -  Megan Gosselin, PT Physical Therapist PT                PT Recommendation and Plan  Anticipated Equipment Needs At Discharge:  (has FWW)  Anticipated Discharge Disposition: home with home health  Planned Therapy Interventions: balance training, bed mobility  training, gait training, home exercise program, patient/family education, ROM (Range of Motion), strengthening, stair training, transfer training  PT Frequency: 2 times/day  Plan of Care Review  Outcome Summary/Follow up Plan: Pt is POD1 L TKA. he was previously independent with mobility. He has a walker at home. Educated on home exercise program and proper use of walker. Will see to improve L knee ROM, strength, balance and endurance. Anticipate d/c home with [facility] when meidcally able.           IP PT Goals       11/15/17 0958          Bed Mobility PT LTG    Bed Mobility PT LTG, Date Established 11/15/17  -MG      Bed Mobility PT LTG, Time to Achieve 4 days  -MG      Bed Mobility PT LTG, Activity Type all bed mobility  -MG      Bed Mobility PT LTG, [place] Level independent  -MG      Transfer Training PT LTG    Transfer Training PT LTG, Date Established 11/15/17  -MG      Transfer Training PT LTG, Time to Achieve 4 days  -MG      Transfer Training PT LTG, Activity Type all transfers  -MG      Transfer Training PT LTG, [place] Level independent  -MG      Transfer Training PT LTG, Assist Device walker, rolling  -MG      Gait Training PT LTG    Gait Training Goal PT LTG, Date Established 11/15/17  -MG      Gait Training Goal PT LTG, Time to Achieve 4 days  -MG      Gait Training Goal PT LTG, [place] Level supervision required  -MG      Gait Training Goal PT LTG, Assist Device walker, rolling  -MG      Gait Training Goal PT LTG, Distance to Achieve 200  -MG      Stair Training PT LTG    Stair Training Goal PT LTG, Date Established 11/15/17  -MG      Stair Training Goal PT LTG, Time to Achieve 4 days  -MG      Stair Training Goal PT LTG, Number of Steps 12  -MG      Stair Training Goal PT LTG, [place] Level supervision required  -MG      Stair Training Goal PT LTG, Assist Device 2 handrails  -MG      Patient Education PT LTG    Patient Education PT LTG, Date Established 11/15/17  -MG      Patient  Education PT LTG, Time to Achieve 4 days  -MG      Patient Education PT LTG, Education Type HEP  -MG      Patient Education PT LTG, Education Understanding demonstrate adequately;verbalize understanding  -MG        User Key  (r) = Recorded By, (t) = Taken By, (c) = Cosigned By    Initials Name Provider Type    MG Megan Gosselin, PT Physical Therapist                Outcome Measures       11/15/17 1000          How much help from another person do you currently need...    Turning from your back to your side while in flat bed without using bedrails? 4  -MG      Moving from lying on back to sitting on the side of a flat bed without bedrails? 4  -MG      Moving to and from a bed to a chair (including a wheelchair)? 3  -MG      Standing up from a chair using your arms (e.g., wheelchair, bedside chair)? 3  -MG      Climbing 3-5 steps with a railing? 3  -MG      To walk in hospital room? 3  -MG      AM-PAC 6 Clicks Score 20  -MG      Functional Assessment    Outcome Measure Options AM-PAC 6 Clicks Basic Mobility (PT)  -MG        User Key  (r) = Recorded By, (t) = Taken By, (c) = Cosigned By    Initials Name Provider Type    MG Megan Gosselin, PT Physical Therapist           Time Calculation:         PT Charges       11/15/17 1000          Time Calculation    Start Time 0902  -MG      Stop Time 0920  -MG      Time Calculation (min) 18 min  -MG      PT Received On 11/15/17  -MG      PT - Next Appointment 11/15/17  -MG      PT Goal Re-Cert Due Date 11/18/17  -MG        User Key  (r) = Recorded By, (t) = Taken By, (c) = Cosigned By    Initials Name Provider Type    MG Megan Gosselin, PT Physical Therapist          Therapy Charges for Today     Code Description Service Date Service Provider Modifiers Qty    13696801198 HC PT EVAL LOW COMPLEXITY 2 11/15/2017 Megan Gosselin, PT GP 1    64361432488 HC PT THER PROC EA 15 MIN 11/15/2017 Megan Gosselin, PT GP 1          PT G-Codes  Outcome Measure Options: AM-PAC 6 Clicks Basic  Mobility (PT)      Megan Gosselin, PT  11/15/2017

## 2017-11-15 NOTE — PLAN OF CARE
Problem: Patient Care Overview (Adult)  Goal: Plan of Care Review  Outcome: Ongoing (interventions implemented as appropriate)    11/15/17 1620 11/15/17 1702   Coping/Psychosocial Response Interventions   Plan Of Care Reviewed With patient --    Patient Care Overview   Progress improving --    Outcome Evaluation   Outcome Summary/Follow up Plan --  POD 1 L TKA-PCA discontinued. Pain continues with initiation of oral meds. Patient has had sever hiccups so MD changed pain meds and ordered thorazine. one dose given. Pt with dx of HTN-reviewed need to continue to monitor and take meds as ordered.       Goal: Adult Individualization and Mutuality  Outcome: Ongoing (interventions implemented as appropriate)  Goal: Discharge Needs Assessment  Outcome: Ongoing (interventions implemented as appropriate)    Problem: Perioperative Period (Adult)  Goal: Signs and Symptoms of Listed Potential Problems Will be Absent or Manageable (Perioperative Period)  Outcome: Ongoing (interventions implemented as appropriate)    Problem: Fall Risk (Adult)  Goal: Absence of Falls  Outcome: Ongoing (interventions implemented as appropriate)    Problem: Knee Replacement, Total (Adult)  Goal: Signs and Symptoms of Listed Potential Problems Will be Absent or Manageable (Knee Replacement, Total)  Outcome: Ongoing (interventions implemented as appropriate)

## 2017-11-16 VITALS
BODY MASS INDEX: 34.04 KG/M2 | RESPIRATION RATE: 16 BRPM | HEIGHT: 72 IN | HEART RATE: 96 BPM | OXYGEN SATURATION: 94 % | DIASTOLIC BLOOD PRESSURE: 79 MMHG | TEMPERATURE: 99.9 F | SYSTOLIC BLOOD PRESSURE: 138 MMHG | WEIGHT: 251.3 LBS

## 2017-11-16 LAB
HCT VFR BLD AUTO: 38.4 % (ref 40.4–52.2)
HGB BLD-MCNC: 12.2 G/DL (ref 13.7–17.6)

## 2017-11-16 PROCEDURE — 97150 GROUP THERAPEUTIC PROCEDURES: CPT

## 2017-11-16 PROCEDURE — 85018 HEMOGLOBIN: CPT | Performed by: ORTHOPAEDIC SURGERY

## 2017-11-16 PROCEDURE — 99024 POSTOP FOLLOW-UP VISIT: CPT | Performed by: NURSE PRACTITIONER

## 2017-11-16 PROCEDURE — 97110 THERAPEUTIC EXERCISES: CPT

## 2017-11-16 PROCEDURE — 85014 HEMATOCRIT: CPT | Performed by: ORTHOPAEDIC SURGERY

## 2017-11-16 RX ADMIN — DOCUSATE SODIUM 100 MG: 100 CAPSULE, LIQUID FILLED ORAL at 08:19

## 2017-11-16 RX ADMIN — LISINOPRIL 20 MG: 20 TABLET ORAL at 08:19

## 2017-11-16 RX ADMIN — HYDROCODONE BITARTRATE AND ACETAMINOPHEN 2 TABLET: 7.5; 325 TABLET ORAL at 05:31

## 2017-11-16 RX ADMIN — POLYETHYLENE GLYCOL 3350 17 G: 17 POWDER, FOR SOLUTION ORAL at 08:19

## 2017-11-16 RX ADMIN — ASPIRIN 325 MG: 325 TABLET, DELAYED RELEASE ORAL at 08:19

## 2017-11-16 RX ADMIN — HYDROCODONE BITARTRATE AND ACETAMINOPHEN 2 TABLET: 7.5; 325 TABLET ORAL at 01:12

## 2017-11-16 RX ADMIN — HYDROCODONE BITARTRATE AND ACETAMINOPHEN 2 TABLET: 7.5; 325 TABLET ORAL at 08:49

## 2017-11-16 RX ADMIN — CHLORPROMAZINE HYDROCHLORIDE 25 MG: 25 TABLET, SUGAR COATED ORAL at 09:33

## 2017-11-16 RX ADMIN — CHLORPROMAZINE HYDROCHLORIDE 25 MG: 25 TABLET, SUGAR COATED ORAL at 01:12

## 2017-11-16 RX ADMIN — HYDROCODONE BITARTRATE AND ACETAMINOPHEN 2 TABLET: 7.5; 325 TABLET ORAL at 13:00

## 2017-11-16 RX ADMIN — MUPIROCIN: 20 OINTMENT TOPICAL at 08:19

## 2017-11-16 NOTE — DISCHARGE SUMMARY
Orthopedic Discharge Summary      Patient: Urbano Mares  YOB: 1970  Medical Record Number: [id number]    Attending Physician: Anthony Lujan MD  Consulting Physician(s): none    Date of Admission: 11/14/2017 10:39 AM  Date of Discharge: 11/16/17    Discharge Diagnosis: LEFT TOTAL KNEE ARTHROPLASTY WITH CECILY NAVIGATION,   Acute Blood Loss Anemia, stable  Post-operative Pain  Limited mobility, requires use of walker and assistance when OOB.    Presenting Problem/History of Present Illness: Arthritis of left knee [M17.12]  Arthritis of left knee [M17.12]  Chronic pain of both knees [M25.561, M25.562, G89.29]        Allergies: No Known Allergies    Discharge Medications     Urbano Mares   Home Medication Instructions GEM:[id number]    Printed on:11/16/17 0849   Medication Information                      aspirin  MG EC tablet  Take 1 tablet by mouth 2 (Two) Times a Day for 84 doses.             bisacodyl (DULCOLAX) 5 MG EC tablet  Take 2 tablets by mouth Daily As Needed for Constipation.             chlorproMAZINE (THORAZINE) 25 MG tablet  Take 1 tablet by mouth 3 (Three) Times a Day As Needed (hiccups).             docusate sodium 100 MG capsule  Take 100 mg by mouth 2 (Two) Times a Day.             HYDROcodone-acetaminophen (NORCO) 7.5-325 MG per tablet  1-2 tabs po q 3-4 hr prn severe pain, wean as tolerated             lisinopril (PRINIVIL,ZESTRIL) 10 MG tablet  Take 2 tablets by mouth Daily.             ondansetron ODT (ZOFRAN-ODT) 4 MG disintegrating tablet  Take 1 tablet by mouth Every 6 (Six) Hours As Needed for Nausea or Vomiting.             oxyCODONE-acetaminophen (PERCOCET) 7.5-325 MG per tablet  1-2 tabs po q 3-4 hr prn severe pain, wean as tolerated             polyethylene glycol (MIRALAX) pack packet  Take 17 g by mouth 2 (Two) Times a Day.                   Past Medical History:   Diagnosis Date   • Arthritis     OSTEO   • Hypertension    • Joint pain    • Left knee  pain         Past Surgical History:   Procedure Laterality Date   • TOTAL KNEE ARTHROPLASTY Left 11/14/2017    Procedure: LEFT TOTAL KNEE ARTHROPLASTY WITH CECILY NAVIGATION;  Surgeon: Anthony Lujan MD;  Location: [facility] OR;  Service:    • WISDOM TOOTH EXTRACTION          Social History     Occupational History   • Not on file.     Social History Main Topics   • Smoking status: Current Some Day Smoker     Types: Cigars   • Smokeless tobacco: Never Used      Comment: FEW YEARLY    • Alcohol use 3.6 oz/week     3 Cans of beer, 3 Shots of liquor per week   • Drug use: No   • Sexual activity: Defer    Social History     Social History Narrative        Family History   Problem Relation Age of Onset   • Cancer Mother    • Cancer Father      Prostate, Bladder, Brain   • Hypertension Sister    • Hypertension Brother    • Malig Hyperthermia Neg Hx          Physical Exam: 47 y.o. male Body mass index is 34.08 kg/(m^2).    General Appearance:    Alert, cooperative, in no acute distress                    Vitals:    11/15/17 2044 11/15/17 2307 11/16/17 0323 11/16/17 0705   BP:  119/68 112/72 135/79   BP Location:  Right arm Right arm Right arm   Patient Position:  Lying Lying Lying   Pulse:  100 102 91   Resp:  16 16 16   Temp: 100 °F (37.8 °C) (!) 100.6 °F (38.1 °C)  Comment: rn told 99 °F (37.2 °C) 99.5 °F (37.5 °C)   TempSrc: Oral Oral Oral Oral   SpO2:  92% 92% 91%   Weight:       Height:            DIAGNOSTIC TESTS:   Admission on 11/14/2017   Component Date Value Ref Range Status   • Glucose 11/15/2017 165* 65 - 99 mg/dL Final   • BUN 11/15/2017 16  6 - 20 mg/dL Final   • Creatinine 11/15/2017 0.96  0.76 - 1.27 mg/dL Final   • Sodium 11/15/2017 138  136 - 145 mmol/L Final   • Potassium 11/15/2017 4.4  3.5 - 5.2 mmol/L Final   • Chloride 11/15/2017 100  98 - 107 mmol/L Final   • CO2 11/15/2017 25.9  22.0 - 29.0 mmol/L Final   • Calcium 11/15/2017 9.4  8.6 - 10.5 mg/dL Final   • eGFR Non African Amer 11/15/2017 83   >60 mL/min/1.73 Final   • BUN/Creatinine Ratio 11/15/2017 16.7  7.0 - 25.0 Final   • Anion Gap 11/15/2017 12.1  mmol/L Final   • Hemoglobin 11/15/2017 12.9* 13.7 - 17.6 g/dL Final   • Hematocrit 11/15/2017 41.4  40.4 - 52.2 % Final   • Hemoglobin 11/16/2017 12.2* 13.7 - 17.6 g/dL Final   • Hematocrit 11/16/2017 38.4* 40.4 - 52.2 % Final       Xr Knee 1 Or 2 View Left    Result Date: 11/14/2017  Narrative: 2 VIEW PORTABLE LEFT KNEE  HISTORY: Knee replacement for osteoarthritis.  FINDINGS: The patient has had recent total knee replacement and the alignment appears satisfactory.  This report was finalized on 11/14/2017 5:57 PM by Dr. Fadi Garay MD.        Hospital Course:  47 y.o. male admitted to [facility] to services of Anthony Lujan MD with Arthritis of left knee [M17.12]  Arthritis of left knee [M17.12]  Chronic pain of both knees [M25.561, M25.562, G89.29] on 11/14/2017 and underwent LEFT TOTAL KNEE ARTHROPLASTY WITH CECILY NAVIGATION  Per Anthony Lujan MD. Antibiotic and VTE prophylaxis were per SCIP protocols. Post-operatively the patient transferred to the post-operative floor where the patient underwent mobilization therapy that included active as well as passive ROM exercises. Opioids were titrated to achieve appropriate pain management to allow for participation in mobilization exercises. Vital signs are now stable. On the day of discharge the wound was clean, dry and intact and calf was soft and non tender and Homans sign was negative. Operative extremity neurovascular status remains intact.   Appropriate education re: incision care, activity levels, medications, and follow up visits was completed and all questions were answered. The patient is now deemed stable for discharge.    Condition on Discharge:  Stable    Discharge Instructions: Patient is to continue with physical therapy exercises twice daily and continue working with the physical therapist as ordered. Patient may weight bear as  tolerated unless otherwise specified. Continue EULA hose daily (for six weeks) and ice regularly. Patient instructed on frequent calf pumping exercises.  Patient also instructed on incentive spirometer during hospitalization and encouraged to continue to use at home regularly.     For Total Knee Replacement: May shower on POD#5.  The wound should be gently cleaned with antibacterial soap then dried and covered with a dry sterile dressing. The wound should be covered at all times except while showering until all drainage has stopped.  Patient may change dressings daily and prn using sterile 4x4 and paper tape, and should call if any unusual drainage, redness or swelling. Staples will be removed in the office in 2 weeks.    Follow up Instructions:  Follow up in the office with Dr. Anthony David in 2 weeks - patient to call the office at [phone] to schedule. Prescriptions were given for pain medication and blood thinner therapy.    Follow-up Appointments  Future Appointments  Date Time Provider Department Center   11/29/2017 1:10 PM Anthony David MD [facility] None     Additional Instructions for the Follow-ups that You Need to Schedule     Ambulatory Referral to Home Health    As directed    Face to Face Visit Date:  11/14/2017   Follow-up Provider for Plan of Care?:  I will be treating the patient on an ongoing basis.  Please send me the Plan of Care for signature.   Follow-up Provider:  ANTHONY DAVID   Reason/Clinical Findings:  post operative pain with ambulation, requires use of walker for ambulation   Describe mobility limitations that make leaving home difficult:  requires assist of another to leave home.   Nursing/Therapeutic Services Requested:   Skilled Nursing  Physical Therapy      Skilled nursing orders:  Wound care dressing/changes   PT orders:  Total joint pathway   Frequency:  1 Week 1       Discharge Follow-up with Specified Provider: Anthony David M.D. at [facility] [phone]; 2 Weeks    As directed    To:  Anthony Lujan M.D. at [facility] [phone]   Follow Up:  2 Weeks       Dressing Change Instructions    As directed    IV. INCISION CARE: May shower and let water run over the incision on post-operative day #5. Do not scrub or rub the incision. Simply let the water run over the incision and pat dry. Keep covered with clean dry dressing as long as there is drainage.    Wash your hands prior to dressing changes  Change the dressing daily as needed to keep incision clean and dry. Utilize dry gauze and paper tape. Avoid touching the side of the gauze that goes against the incision with your hands.  No creams or ointments to the incision  May remove dressing once the incision is free of drainage usually around 5 days post op.  Do not touch or pick at the incision  Check incision every day and notify surgeon immediately if any of the following signs or symptoms are noted:  Increase in baseline redness (bruising is normal)  Increase in baseline swelling around the incision and of the entire extremity  Sudden increase in pain or inability to bend the knee  Drainage oozing from the incision more than 5 days out from surgery.  Pulling apart of the edges of the incision  Increase in overall body temperature (greater than 100.5 degrees) Make sure you are doing your incentive spirometer and deep breathing exercises every day while awake as this is the most frequent cause of low grade fever post operatively.  Your staple removal will be done in the office at your follow-up visit.                 Discharge Disposition Plan:today to home and home health    Date: 11/16/2017    Anthony Lujan MD

## 2017-11-16 NOTE — NURSING NOTE
Spoke with dr peter verbally when he was on the floor to see patient. Dr peter was notified of patient temp of 99.9 and hr of 111bpm and he stated patient was safe to be discharged today

## 2017-11-16 NOTE — PROGRESS NOTES
Orthopedic Total Joint Progress Note        Patient: Urbano Mares    Date of Admission: 11/14/2017 10:39 AM    YOB: 1970    Medical Record Number: [id number]    Attending Physician: Anthony Lujan MD      POD # 2 Days Post-Op Procedure(s) (LRB):  LEFT TOTAL KNEE ARTHROPLASTY WITH CECILY NAVIGATION (Left)       Systemic or Specific Complaints: hiccups improved but sore chest muscles from them      Allergies: No Known Allergies    Medications:   Current Medications:  Scheduled Meds:  aspirin 325 mg Oral BID   docusate sodium 100 mg Oral BID   lisinopril 20 mg Oral Daily   polyethylene glycol 17 g Oral BID     Continuous Infusions:  HYDROmorphone HCl-NaCl     lactated ringers 9 mL/hr Last Rate: 9 mL/hr (11/14/17 1232)     PRN Meds:.•  acetaminophen  •  bisacodyl  •  bisacodyl  •  chlorproMAZINE  •  diphenhydrAMINE  •  diphenhydrAMINE  •  HYDROcodone-acetaminophen **OR** HYDROcodone-acetaminophen  •  magnesium hydroxide  •  naloxone  •  ondansetron **OR** ondansetron ODT **OR** ondansetron  •  promethazine      Physical Exam: 47 y.o. male Body mass index is 34.08 kg/(m^2).  General Appearance:    alert and oriented            Pain Relief: Patient reports good relief Vitals:    11/15/17 2044 11/15/17 2307 11/16/17 0323 11/16/17 0705   BP:  119/68 112/72 135/79   BP Location:  Right arm Right arm Right arm   Patient Position:  Lying Lying Lying   Pulse:  100 102 91   Resp:  16 16 16   Temp: 100 °F (37.8 °C) (!) 100.6 °F (38.1 °C)  Comment: rn told 99 °F (37.2 °C) 99.5 °F (37.5 °C)   TempSrc: Oral Oral Oral Oral   SpO2:  92% 92% 91%   Weight:       Height:              HEENT:    Normocephalic, without obvious abnormality, atraumatic.          PERRLA; EOMI; Neck supple with trachea midline. No JVD.       Lungs:     Respirations regular, even and unlabored      Heart:    Regular rhythm and normal rate.   Abdomen:     Normal bowel sounds, soft non-tender, non-distended       Extremities:   Operative  extremity neurovascular status intact. ROM intact.    Incision intact w/out signs or symptoms of infection.  No cyanosis, no calf tenderness     Pulses:     Pulses palpable and equal bilaterally     Skin:     Skin Warm/Dry w/out cyanosis     Activity: Mobilizing Per P.T.   Weight Bearing: As Tolerated    Diagnostic Tests:   Admission on 11/14/2017   Component Date Value Ref Range Status   • Glucose 11/15/2017 165* 65 - 99 mg/dL Final   • BUN 11/15/2017 16  6 - 20 mg/dL Final   • Creatinine 11/15/2017 0.96  0.76 - 1.27 mg/dL Final   • Sodium 11/15/2017 138  136 - 145 mmol/L Final   • Potassium 11/15/2017 4.4  3.5 - 5.2 mmol/L Final   • Chloride 11/15/2017 100  98 - 107 mmol/L Final   • CO2 11/15/2017 25.9  22.0 - 29.0 mmol/L Final   • Calcium 11/15/2017 9.4  8.6 - 10.5 mg/dL Final   • eGFR Non African Amer 11/15/2017 83  >60 mL/min/1.73 Final   • BUN/Creatinine Ratio 11/15/2017 16.7  7.0 - 25.0 Final   • Anion Gap 11/15/2017 12.1  mmol/L Final   • Hemoglobin 11/15/2017 12.9* 13.7 - 17.6 g/dL Final   • Hematocrit 11/15/2017 41.4  40.4 - 52.2 % Final   • Hemoglobin 11/16/2017 12.2* 13.7 - 17.6 g/dL Final   • Hematocrit 11/16/2017 38.4* 40.4 - 52.2 % Final       Xr Knee 1 Or 2 View Left    Result Date: 11/14/2017  Narrative: 2 VIEW PORTABLE LEFT KNEE  HISTORY: Knee replacement for osteoarthritis.  FINDINGS: The patient has had recent total knee replacement and the alignment appears satisfactory.  This report was finalized on 11/14/2017 5:57 PM by Dr. Fadi Garay MD.        Personally viewed ortho images and report     Assessment:  Doing well POD  # 2 Days Post-Op following total joint replacement  Acute Blood Loss Anemia, stable  Post-operative Pain  Limited mobility, requires use of walker and assistance when OOB.    Patient Active Problem List   Diagnosis   • Chronic pain of both knees   • Arthritis of left knee   • Arthritis of right knee        Plan:  Continue to monitor labs and/or v/s, for tolerance to post op  blood loss.  Continue efforts to increase mobilization.  Continue Pain Control Measures.  Continue incisional Care.  DVT prophylaxis.  Follow up in office with Anthony Lujan M.D. In 2 weeks.    Discharge Plan:today to home and home health    Date: 11/16/2017  Niurka Barron, APRN

## 2017-11-16 NOTE — THERAPY DISCHARGE NOTE
Acute Care - Physical Therapy Treatment Note/Discharge  [facility]     Patient Name: Urbano Mares  : 1970  MRN: [id number]  Today's Date: 2017  Onset of Illness/Injury or Date of Surgery Date:  (POD1 L TKA)     Referring Physician: Le    Admit Date: 2017    Visit Dx:    ICD-10-CM ICD-9-CM   1. Difficulty walking R26.2 719.7   2. Arthritis of left knee M17.12 716.96     Patient Active Problem List   Diagnosis   • Chronic pain of both knees   • Arthritis of left knee   • Arthritis of right knee       Physical Therapy Education     Title: PT OT SLP Therapies (Resolved)     Topic: Physical Therapy (Resolved)     Point: Mobility training (Resolved)    Learning Progress Summary    Learner Readiness Method Response Comment Documented by Status   Patient Acceptance IVANA ANTHONY DU  MS 17 1144 Done    Acceptance E VU,NR  MG 11/15/17 0958 Done               Point: Home exercise program (Resolved)    Learning Progress Summary    Learner Readiness Method Response Comment Documented by Status   Patient Acceptance IVANA ANTHONY DU  MS 17 1144 Done    Acceptance E VU,NR  MG 11/15/17 0958 Done               Point: Body mechanics (Resolved)    Learning Progress Summary    Learner Readiness Method Response Comment Documented by Status   Patient Acceptance IVANA ANTHONY DU  MS 17 1144 Done    Acceptance E VU,NR  MG 11/15/17 0958 Done               Point: Precautions (Resolved)    Learning Progress Summary    Learner Readiness Method Response Comment Documented by Status   Patient Acceptance IVANA ANTHONY DU  MS 17 1144 Done    Acceptance E VU,NR  MG 11/15/17 0958 Done                      User Key     Initials Effective Dates Name Provider Type Discipline    MS 12/01/15 -  Jimmie Breaux, PT Physical Therapist PT    MG 17 -  Megan Gosselin, PT Physical Therapist PT                    IP PT Goals       17 1144 11/15/17 0958       Bed Mobility PT LTG    Bed Mobility PT LTG, Date Established   11/15/17  -MG     Bed Mobility PT LTG, Time to Achieve  4 days  -MG     Bed Mobility PT LTG, Activity Type  all bed mobility  -MG     Bed Mobility PT LTG, [place] Level  independent  -MG     Bed Mobility PT LTG, Outcome goal met  -MS      Transfer Training PT LTG    Transfer Training PT LTG, Date Established  11/15/17  -MG     Transfer Training PT LTG, Time to Achieve  4 days  -MG     Transfer Training PT LTG, Activity Type  all transfers  -MG     Transfer Training PT LTG, [place] Level  independent  -MG     Transfer Training PT LTG, Assist Device  walker, rolling  -MG     Transfer Training PT LTG, Outcome goal met  -MS      Gait Training PT LTG    Gait Training Goal PT LTG, Date Established  11/15/17  -MG     Gait Training Goal PT LTG, Time to Achieve  4 days  -MG     Gait Training Goal PT LTG, [place] Level  supervision required  -MG     Gait Training Goal PT LTG, Assist Device  walker, rolling  -MG     Gait Training Goal PT LTG, Distance to Achieve  200  -MG     Gait Training Goal PT LTG, Outcome goal partially met  -MS      Stair Training PT LTG    Stair Training Goal PT LTG, Date Established  11/15/17  -MG     Stair Training Goal PT LTG, Time to Achieve  4 days  -MG     Stair Training Goal PT LTG, Number of Steps  12  -MG     Stair Training Goal PT LTG, [place] Level  supervision required  -MG     Stair Training Goal PT LTG, Assist Device  2 handrails  -MG     Stair Training Goal PT LTG, Outcome goal met  -MS      Patient Education PT LTG    Patient Education PT LTG, Date Established  11/15/17  -MG     Patient Education PT LTG, Time to Achieve  4 days  -MG     Patient Education PT LTG, Education Type  HEP  -MG     Patient Education PT LTG, Education Understanding  demonstrate adequately;verbalize understanding  -MG     Patient Education PT LTG Outcome goal met  -MS        User Key  (r) = Recorded By, (t) = Taken By, (c) = Cosigned By    Initials Name Provider Type    MS Jimmie Breaux, PT  Physical Therapist    MG Megan Gosselin, PT Physical Therapist              Adult Rehabilitation Note       11/16/17 1142 11/15/17 1618       Rehab Assessment/Intervention    Discipline physical therapist  -MS physical therapist  -MS     Document Type therapy note (daily note);discharge summary  -MS therapy note (daily note)  -MS     Subjective Information agree to therapy;complains of;pain;fatigue  -MS agree to therapy;complains of;fatigue;pain  -MS     Patient Effort, Rehab Treatment good  -MS good  -MS     Symptoms Noted During/After Treatment fatigue;increased pain  -MS fatigue;increased pain  -MS     Precautions/Limitations  fall precautions  -MS     Recorded by [MS] Jimmie Breaux PT [MS] Jimmie Breaux PT     Pain Assessment    Pain Assessment 0-10  -MS 0-10  -MS     Pain Score 7  -MS 5  -MS     Post Pain Score 7  -MS 5  -MS     Pain Type Acute pain;Surgical pain  -MS Acute pain;Surgical pain  -MS     Pain Location Knee  -MS Knee  -MS     Pain Orientation Left  -MS Left  -MS     Pain Intervention(s) Medication (See MAR);Cold applied;Repositioned;Elevated;Rest  -MS Medication (See MAR);Cold applied;Repositioned;Elevated;Rest  -MS     Recorded by [MS] Jimmie Breaux PT [MS] Jimmie Breaux PT     Cognitive Assessment/Intervention    Current Cognitive/Communication Assessment functional  -MS      Orientation Status oriented x 4  -MS      Follows Commands/Answers Questions 100% of the time  -MS      Personal Safety WNL/WFL  -MS      Personal Safety Interventions fall prevention program maintained;gait belt;nonskid shoes/slippers when out of bed;supervised activity  -MS      Recorded by [MS] Jimmie Breaux PT      ROM (Range of Motion)    General ROM Detail Left knee AROM (7, 76)  -MS Left knee AROM (14, 90)  -MS     Recorded by [MS] Jimmie Breaux PT [MS] Jimmie Breaux PT     Bed Mobility, Assessment/Treatment    Bed Mob, Supine to Sit, [place] independent  -MS      Bed Mob, Sit to  Supine, [place] independent  -MS      Bed Mobility, Comment  Up in chair for PM gym  -MS     Recorded by [MS] Jimmie Breaux PT [MS] Jimmie Breaux PT     Transfer Assessment/Treatment    Transfers, Sit-Stand [place] independent  -MS contact guard assist  -MS     Transfers, Stand-Sit [place] independent  -MS contact guard assist  -MS     Transfers, Sit-Stand-Sit, Assist Device rolling walker  -MS rolling walker  -MS     Recorded by [MS] Jimmie Breaux PT [MS] Jimmie Breaux PT     Gait Assessment/Treatment    Gait, [place] Level independent  -MS contact guard assist  -MS     Gait, Assistive Device rolling walker  -MS rolling walker  -MS     Gait, Distance (Feet) 120  -  -MS     Gait, Gait Pattern Analysis swing-through gait  -MS swing-through gait  -MS     Gait, Gait Deviations left:;antalgic;liban decreased  -MS left:;antalgic;liban decreased  -MS     Recorded by [MS] Jimmie Breaux PT [MS] Jimmie Breaux PT     Stairs Assessment/Treatment    Number of Stairs 4  -MS      Stairs, Handrail Location none   Crutches (pt. has at home already)  -MS      Stairs, [place] Level contact guard assist  -MS      Recorded by [MS] Jimmie Breaux PT      Therapy Exercises    Exercise Protocols total knee  -MS total knee  -MS     Total Knee Exercises left:;20 reps;completed protocol  -MS left:;15 reps;completed protocol  -MS     Recorded by [MS] Jimmie Breaux PT [MS] Jimmie Breaux PT     Positioning and Restraints    Pre-Treatment Position sitting in chair/recliner  -MS sitting in chair/recliner  -MS     Post Treatment Position chair  -MS chair  -MS     In Chair notified nsg;reclined;sitting;call light within reach;encouraged to call for assist   Ice pack to Left knee.  -MS notified nsg;reclined;sitting;call light within reach;encouraged to call for assist;with family/caregiver  -MS     Recorded by [MS] Jimmie Breaux, PT [MS] Jimmie Breaux PT       User Berrios  (r)  = Recorded By, (t) = Taken By, (c) = Cosigned By    Initials Name Effective Dates    MS Jimmie PEÑALOZA Namita, PT 12/01/15 -           PT Recommendation and Plan  Anticipated Equipment Needs At Discharge:  (has FWW)  Anticipated Discharge Disposition: home with home health  Planned Therapy Interventions: balance training, bed mobility training, gait training, home exercise program, patient/family education, ROM (Range of Motion), strengthening, stair training, transfer training  PT Frequency: 2 times/day  Plan of Care Review  Plan Of Care Reviewed With: patient  Progress: improving  Outcome Summary/Follow up Plan: Improved tolerance to functional activity this PM with an increase in gait distance and progression of ther. ex. protocol          Outcome Measures       11/16/17 1100 11/15/17 1600 11/15/17 1000    How much help from another person do you currently need...    Turning from your back to your side while in flat bed without using bedrails? 4  -MS 4  -MS 4  -MG    Moving from lying on back to sitting on the side of a flat bed without bedrails? 4  -MS 4  -MS 4  -MG    Moving to and from a bed to a chair (including a wheelchair)? 4  -MS 3  -MS 3  -MG    Standing up from a chair using your arms (e.g., wheelchair, bedside chair)? 4  -MS 3  -MS 3  -MG    Climbing 3-5 steps with a railing? 3  -MS 3  -MS 3  -MG    To walk in hospital room? 4  -MS 3  -MS 3  -MG    AM-PAC 6 Clicks Score 23  -MS 20  -MS 20  -MG    Functional Assessment    Outcome Measure Options AM-PAC 6 Clicks Basic Mobility (PT)  -MS AM-PAC 6 Clicks Basic Mobility (PT)  -MS AM-PAC 6 Clicks Basic Mobility (PT)  -MG      User Key  (r) = Recorded By, (t) = Taken By, (c) = Cosigned By    Initials Name Provider Type    MS Jimmie PEÑALOZA Namita, PT Physical Therapist    MG Megan Gosselin, PT Physical Therapist           Time Calculation:         PT Charges       11/16/17 1145          Time Calculation    Start Time 1040  -MS      Stop Time 1110  -MS      Time  Calculation (min) 30 min  -MS      PT Received On 11/16/17  -MS        User Key  (r) = Recorded By, (t) = Taken By, (c) = Cosigned By    Initials Name Provider Type    MS Jimmie Breaux, PT Physical Therapist          Therapy Charges for Today     Code Description Service Date Service Provider Modifiers Qty    88156882347 HC PT THER PROC EA 15 MIN 11/15/2017 Jimmie Breaux, PT GP 1    62590287259 HC PT THER PROC GROUP 11/15/2017 Jimmie Breaux, PT GP 1    10783229213 HC PT THER PROC EA 15 MIN 11/16/2017 Jimmie Breaux, PT GP 1    74704599465 HC PT THER PROC GROUP 11/16/2017 Jimmie Breaux, PT GP 1          PT G-Codes  Outcome Measure Options: AM-PAC 6 Clicks Basic Mobility (PT)    PT Discharge Summary  Reason for Discharge: Discharge from facility, All goals achieved  Outcomes Achieved: Refer to plan of care for updates on goals achieved  Discharge Destination: Home with assist, Home with home health    Jimmie Breaux, PT  11/16/2017

## 2017-11-16 NOTE — PLAN OF CARE
Problem: Inpatient Physical Therapy  Goal: Bed Mobility Goal LTG- PT    11/16/17 1144   Bed Mobility PT LTG   Bed Mobility PT LTG, Outcome goal met       Goal: Transfer Training Goal 1 LTG- PT    11/16/17 1144   Transfer Training PT LTG   Transfer Training PT LTG, Outcome goal met       Goal: Gait Training Goal LTG- PT    11/16/17 1144   Gait Training PT LTG   Gait Training Goal PT LTG, Outcome goal partially met       Goal: Stair Training Goal LTG- PT    11/16/17 1144   Stair Training PT LTG   Stair Training Goal PT LTG, Outcome goal met       Goal: Patient Education Goal LTG- PT    11/16/17 1144   Patient Education PT LTG   Patient Education PT LTG Outcome goal met

## 2017-11-16 NOTE — DISCHARGE INSTRUCTIONS
c IV. INCISION CARE: May shower and let water run over the incision on post-operative day #5. Do not scrub or rub the incision. Simply let the water run over the incision and pat dry. Keep covered with clean dry dressing as long as there is drainage.     Wash your hands prior to dressing changes  Change the dressing daily as needed to keep incision clean and dry. Utilize dry gauze and paper tape. Avoid touching the side of the gauze that goes against the incision with your hands.  No creams or ointments to the incision  May remove dressing once the incision is free of drainage usually around 5 days post op.  Do not touch or pick at the incision  Check incision every day and notify surgeon immediately if any of the following signs or symptoms are noted:  Increase in baseline redness (bruising is normal)  Increase in baseline swelling around the incision and of the entire extremity  Sudden increase in pain or inability to bend the knee  Drainage oozing from the incision more than 5 days out from surgery.  Pulling apart of the edges of the incision  Increase in overall body temperature (greater than 100.5 degrees) Make sure you are doing your incentive spirometer and deep breathing exercises every day while awake as this is the most frequent cause of low grade fever post operatively.  Your staple removal will be done in the office at your follow-up visit.

## 2017-11-16 NOTE — PLAN OF CARE
Problem: Patient Care Overview (Adult)  Goal: Plan of Care Review  Outcome: Ongoing (interventions implemented as appropriate)    11/16/17 0449   Coping/Psychosocial Response Interventions   Plan Of Care Reviewed With patient   Patient Care Overview   Progress improving   Outcome Evaluation   Outcome Summary/Follow up Plan temp elevated-IS encouraged, still with hiccups-thorazine given, pain increased, meds given Q4, RA, SL, voiding per urinal, educated on monitoring BP and med management, plans for home with HH possible today       Goal: Adult Individualization and Mutuality  Outcome: Ongoing (interventions implemented as appropriate)  Goal: Discharge Needs Assessment  Outcome: Ongoing (interventions implemented as appropriate)    Problem: Fall Risk (Adult)  Goal: Absence of Falls  Outcome: Ongoing (interventions implemented as appropriate)    Problem: Knee Replacement, Total (Adult)  Goal: Signs and Symptoms of Listed Potential Problems Will be Absent or Manageable (Knee Replacement, Total)  Outcome: Ongoing (interventions implemented as appropriate)

## 2017-11-17 NOTE — PAYOR COMM NOTE
"UR CONTACT:    SHABBIR                 P: [phone]  F: [phone]        Urbano Mares (47 y.o. Male)     Date of Birth Social Security Number Address Home Phone MRN    1970  [address]  [facility] [address] [phone] [id number]    Yazidism Marital Status          Unknown        Admission Date Admission Type Admitting Provider Attending Provider Department, Room/Bed    17 Elective Anthony Lujan MD  [address], P886/1    Discharge Date Discharge Disposition Discharge Destination        2017 Home-Health Care Sv             Attending Provider: (none)    Allergies:  No Known Allergies    Isolation:  None   Infection:  None   Code Status:  Prior    Ht:  72\" (182.9 cm)   Wt:  251 lb 4.8 oz (114 kg)    Admission Cmt:  None   Principal Problem:  Arthritis of left knee [M17.12]                 Active Insurance as of 2017     Primary Coverage     Payor Plan Insurance Group Employer/Plan Group    ANTHEM BLUE CROSS [facility] [id number]     Payor Plan Address Payor Plan Phone Number Effective From Effective To    [address] [phone] 2017     [place] [address]       Subscriber Name Subscriber Birth Date Member ID       URMILA MARES 1966 [id number]                 Emergency Contacts      (Rel.) Home Phone Work Phone Mobile Phone    Urmila Mares (Spouse) -- -- [phone]            Physician Progress Notes             JOHN Hardy Nurse Practitioner Signed Orthopedics Progress Notes Date of Service: 2017  7:57 AM              Orthopedic Total Joint Progress Note           Patient: Urbano Mares     Date of Admission: 2017 10:39 AM     YOB: 1970     Medical Record Number: [id number]     Attending Physician: Anthony Lujan MD        POD # 2 Days Post-Op Procedure(s) (LRB):  LEFT TOTAL KNEE ARTHROPLASTY WITH CECILY NAVIGATION (Left)         Systemic or Specific " Complaints: hiccups improved but sore chest muscles from them        Allergies: No Known Allergies     Medications:   Current Medications:  Scheduled Meds:  aspirin 325 mg Oral BID   docusate sodium 100 mg Oral BID   lisinopril 20 mg Oral Daily   polyethylene glycol 17 g Oral BID      Continuous Infusions:  HYDROmorphone HCl-NaCl       lactated ringers 9 mL/hr Last Rate: 9 mL/hr (11/14/17 1232)      PRN Meds:.•  acetaminophen  •  bisacodyl  •  bisacodyl  •  chlorproMAZINE  •  diphenhydrAMINE  •  diphenhydrAMINE  •  HYDROcodone-acetaminophen **OR** HYDROcodone-acetaminophen  •  magnesium hydroxide  •  naloxone  •  ondansetron **OR** ondansetron ODT **OR** ondansetron  •  promethazine        Physical Exam: 47 y.o. male Body mass index is 34.08 kg/(m^2).  General Appearance:    alert and oriented             Vitals           Pain Relief: Patient reports good relief Vitals:     11/15/17 2044 11/15/17 2307 11/16/17 0323 11/16/17 0705   BP:   119/68 112/72 135/79   BP Location:   Right arm Right arm Right arm   Patient Position:   Lying Lying Lying   Pulse:   100 102 91   Resp:   16 16 16   Temp: 100 °F (37.8 °C) (!) 100.6 °F (38.1 °C)  Comment: rn told 99 °F (37.2 °C) 99.5 °F (37.5 °C)   TempSrc: Oral Oral Oral Oral   SpO2:   92% 92% 91%   Weight:           Height:                      HEENT:    Normocephalic, without obvious abnormality, atraumatic.          PERRLA; EOMI; Neck supple with trachea midline. No JVD.       Lungs:     Respirations regular, even and unlabored    Heart:    Regular rhythm and normal rate.   Abdomen:     Normal bowel sounds, soft non-tender, non-distended         Extremities:   Operative extremity neurovascular status intact. ROM intact.    Incision intact w/out signs or symptoms of infection.  No cyanosis, no calf tenderness      Pulses:      Pulses palpable and equal bilaterally      Skin:     Skin Warm/Dry w/out cyanosis      Activity: Mobilizing Per P.T.   Weight Bearing: As  Tolerated     Diagnostic Tests:           Admission on 11/14/2017   Component Date Value Ref Range Status   • Glucose 11/15/2017 165* 65 - 99 mg/dL Final   • BUN 11/15/2017 16  6 - 20 mg/dL Final   • Creatinine 11/15/2017 0.96  0.76 - 1.27 mg/dL Final   • Sodium 11/15/2017 138  136 - 145 mmol/L Final   • Potassium 11/15/2017 4.4  3.5 - 5.2 mmol/L Final   • Chloride 11/15/2017 100  98 - 107 mmol/L Final   • CO2 11/15/2017 25.9  22.0 - 29.0 mmol/L Final   • Calcium 11/15/2017 9.4  8.6 - 10.5 mg/dL Final   • eGFR Non African Amer 11/15/2017 83  >60 mL/min/1.73 Final   • BUN/Creatinine Ratio 11/15/2017 16.7  7.0 - 25.0 Final   • Anion Gap 11/15/2017 12.1  mmol/L Final   • Hemoglobin 11/15/2017 12.9* 13.7 - 17.6 g/dL Final   • Hematocrit 11/15/2017 41.4  40.4 - 52.2 % Final   • Hemoglobin 11/16/2017 12.2* 13.7 - 17.6 g/dL Final   • Hematocrit 11/16/2017 38.4* 40.4 - 52.2 % Final         Xr Knee 1 Or 2 View Left     Result Date: 11/14/2017  Narrative: 2 VIEW PORTABLE LEFT KNEE  HISTORY: Knee replacement for osteoarthritis.  FINDINGS: The patient has had recent total knee replacement and the alignment appears satisfactory.  This report was finalized on 11/14/2017 5:57 PM by Dr. Fadi Garay MD.          Personally viewed ortho images and report      Assessment:  Doing well POD  # 2 Days Post-Op following total joint replacement  Acute Blood Loss Anemia, stable  Post-operative Pain  Limited mobility, requires use of walker and assistance when OOB.         Patient Active Problem List   Diagnosis   • Chronic pain of both knees   • Arthritis of left knee   • Arthritis of right knee         Plan:  Continue to monitor labs and/or v/s, for tolerance to post op blood loss.  Continue efforts to increase mobilization.  Continue Pain Control Measures.  Continue incisional Care.  DVT prophylaxis.  Follow up in office with Anthony Lujan M.D. In 2 weeks.     Discharge Plan:today to home and home health     Date: 11/16/2017  Niurka  JOHN Chakraborty                                           Discharge Summary      Anthony Lujan MD at 11/16/2017  8:49 AM           Orthopedic Discharge Summary      Patient: Urbano Mares  YOB: 1970  Medical Record Number: [id number]    Attending Physician: Anthony Lujan MD  Consulting Physician(s): none    Date of Admission: 11/14/2017 10:39 AM  Date of Discharge: 11/16/17    Discharge Diagnosis: LEFT TOTAL KNEE ARTHROPLASTY WITH CECILY NAVIGATION,   Acute Blood Loss Anemia, stable  Post-operative Pain  Limited mobility, requires use of walker and assistance when OOB.    Presenting Problem/History of Present Illness: Arthritis of left knee [M17.12]  Arthritis of left knee [M17.12]  Chronic pain of both knees [M25.561, M25.562, G89.29]        Allergies: No Known Allergies    Discharge Medications     Urbano Mares   Home Medication Instructions GEM:[id number]    Printed on:11/16/17 0849   Medication Information                      aspirin  MG EC tablet  Take 1 tablet by mouth 2 (Two) Times a Day for 84 doses.             bisacodyl (DULCOLAX) 5 MG EC tablet  Take 2 tablets by mouth Daily As Needed for Constipation.             chlorproMAZINE (THORAZINE) 25 MG tablet  Take 1 tablet by mouth 3 (Three) Times a Day As Needed (hiccups).             docusate sodium 100 MG capsule  Take 100 mg by mouth 2 (Two) Times a Day.             HYDROcodone-acetaminophen (NORCO) 7.5-325 MG per tablet  1-2 tabs po q 3-4 hr prn severe pain, wean as tolerated             lisinopril (PRINIVIL,ZESTRIL) 10 MG tablet  Take 2 tablets by mouth Daily.             ondansetron ODT (ZOFRAN-ODT) 4 MG disintegrating tablet  Take 1 tablet by mouth Every 6 (Six) Hours As Needed for Nausea or Vomiting.             oxyCODONE-acetaminophen (PERCOCET) 7.5-325 MG per tablet  1-2 tabs po q 3-4 hr prn severe pain, wean as tolerated             polyethylene glycol (MIRALAX) pack packet  Take 17 g by mouth 2 (Two) Times a Day.                    Past Medical History:   Diagnosis Date   • Arthritis     OSTEO   • Hypertension    • Joint pain    • Left knee pain         Past Surgical History:   Procedure Laterality Date   • TOTAL KNEE ARTHROPLASTY Left 11/14/2017    Procedure: LEFT TOTAL KNEE ARTHROPLASTY WITH CECILY NAVIGATION;  Surgeon: Anthony Lujan MD;  Location: [facility] OR;  Service:    • WISDOM TOOTH EXTRACTION          Social History     Occupational History   • Not on file.     Social History Main Topics   • Smoking status: Current Some Day Smoker     Types: Cigars   • Smokeless tobacco: Never Used      Comment: FEW YEARLY    • Alcohol use 3.6 oz/week     3 Cans of beer, 3 Shots of liquor per week   • Drug use: No   • Sexual activity: Defer    Social History     Social History Narrative        Family History   Problem Relation Age of Onset   • Cancer Mother    • Cancer Father      Prostate, Bladder, Brain   • Hypertension Sister    • Hypertension Brother    • Malig Hyperthermia Neg Hx          Physical Exam: 47 y.o. male Body mass index is 34.08 kg/(m^2).    General Appearance:    Alert, cooperative, in no acute distress                    Vitals:    11/15/17 2044 11/15/17 2307 11/16/17 0323 11/16/17 0705   BP:  119/68 112/72 135/79   BP Location:  Right arm Right arm Right arm   Patient Position:  Lying Lying Lying   Pulse:  100 102 91   Resp:  16 16 16   Temp: 100 °F (37.8 °C) (!) 100.6 °F (38.1 °C)  Comment: rn told 99 °F (37.2 °C) 99.5 °F (37.5 °C)   TempSrc: Oral Oral Oral Oral   SpO2:  92% 92% 91%   Weight:       Height:            DIAGNOSTIC TESTS:   Admission on 11/14/2017   Component Date Value Ref Range Status   • Glucose 11/15/2017 165* 65 - 99 mg/dL Final   • BUN 11/15/2017 16  6 - 20 mg/dL Final   • Creatinine 11/15/2017 0.96  0.76 - 1.27 mg/dL Final   • Sodium 11/15/2017 138  136 - 145 mmol/L Final   • Potassium 11/15/2017 4.4  3.5 - 5.2 mmol/L Final   • Chloride 11/15/2017 100  98 - 107 mmol/L Final   • CO2  11/15/2017 25.9  22.0 - 29.0 mmol/L Final   • Calcium 11/15/2017 9.4  8.6 - 10.5 mg/dL Final   • eGFR Non African Amer 11/15/2017 83  >60 mL/min/1.73 Final   • BUN/Creatinine Ratio 11/15/2017 16.7  7.0 - 25.0 Final   • Anion Gap 11/15/2017 12.1  mmol/L Final   • Hemoglobin 11/15/2017 12.9* 13.7 - 17.6 g/dL Final   • Hematocrit 11/15/2017 41.4  40.4 - 52.2 % Final   • Hemoglobin 11/16/2017 12.2* 13.7 - 17.6 g/dL Final   • Hematocrit 11/16/2017 38.4* 40.4 - 52.2 % Final       Xr Knee 1 Or 2 View Left    Result Date: 11/14/2017  Narrative: 2 VIEW PORTABLE LEFT KNEE  HISTORY: Knee replacement for osteoarthritis.  FINDINGS: The patient has had recent total knee replacement and the alignment appears satisfactory.  This report was finalized on 11/14/2017 5:57 PM by Dr. Fadi Garay MD.        Hospital Course:  47 y.o. male admitted to [facility] to services of Anthony Luajn MD with Arthritis of left knee [M17.12]  Arthritis of left knee [M17.12]  Chronic pain of both knees [M25.561, M25.562, G89.29] on 11/14/2017 and underwent LEFT TOTAL KNEE ARTHROPLASTY WITH CECILY NAVIGATION  Per Anthony Lujan MD. Antibiotic and VTE prophylaxis were per SCIP protocols. Post-operatively the patient transferred to the post-operative floor where the patient underwent mobilization therapy that included active as well as passive ROM exercises. Opioids were titrated to achieve appropriate pain management to allow for participation in mobilization exercises. Vital signs are now stable. On the day of discharge the wound was clean, dry and intact and calf was soft and non tender and Homans sign was negative. Operative extremity neurovascular status remains intact.   Appropriate education re: incision care, activity levels, medications, and follow up visits was completed and all questions were answered. The patient is now deemed stable for discharge.    Condition on Discharge:  Stable    Discharge Instructions: Patient is to continue  with physical therapy exercises twice daily and continue working with the physical therapist as ordered. Patient may weight bear as tolerated unless otherwise specified. Continue EULA hose daily (for six weeks) and ice regularly. Patient instructed on frequent calf pumping exercises.  Patient also instructed on incentive spirometer during hospitalization and encouraged to continue to use at home regularly.     For Total Knee Replacement: May shower on POD#5.  The wound should be gently cleaned with antibacterial soap then dried and covered with a dry sterile dressing. The wound should be covered at all times except while showering until all drainage has stopped.  Patient may change dressings daily and prn using sterile 4x4 and paper tape, and should call if any unusual drainage, redness or swelling. Staples will be removed in the office in 2 weeks.    Follow up Instructions:  Follow up in the office with Dr. Anthony David in 2 weeks - patient to call the office at [phone] to schedule. Prescriptions were given for pain medication and blood thinner therapy.    Follow-up Appointments  Future Appointments  Date Time Provider Department Center   11/29/2017 1:10 PM Anthony David MD [facility] None     Additional Instructions for the Follow-ups that You Need to Schedule     Ambulatory Referral to Home Health    As directed    Face to Face Visit Date:  11/14/2017   Follow-up Provider for Plan of Care?:  I will be treating the patient on an ongoing basis.  Please send me the Plan of Care for signature.   Follow-up Provider:  ANTHONY DAVID   Reason/Clinical Findings:  post operative pain with ambulation, requires use of walker for ambulation   Describe mobility limitations that make leaving home difficult:  requires assist of another to leave home.   Nursing/Therapeutic Services Requested:   Skilled Nursing  Physical Therapy      Skilled nursing orders:  Wound care dressing/changes   PT orders:  Total joint pathway    Frequency:  1 Week 1       Discharge Follow-up with Specified Provider: Anthony Lujan M.D. at [facility] [phone]; 2 Weeks    As directed    To:  Anthony Lujan M.D. at [facility] [phone]   Follow Up:  2 Weeks       Dressing Change Instructions    As directed    IV. INCISION CARE: May shower and let water run over the incision on post-operative day #5. Do not scrub or rub the incision. Simply let the water run over the incision and pat dry. Keep covered with clean dry dressing as long as there is drainage.    Wash your hands prior to dressing changes  Change the dressing daily as needed to keep incision clean and dry. Utilize dry gauze and paper tape. Avoid touching the side of the gauze that goes against the incision with your hands.  No creams or ointments to the incision  May remove dressing once the incision is free of drainage usually around 5 days post op.  Do not touch or pick at the incision  Check incision every day and notify surgeon immediately if any of the following signs or symptoms are noted:  Increase in baseline redness (bruising is normal)  Increase in baseline swelling around the incision and of the entire extremity  Sudden increase in pain or inability to bend the knee  Drainage oozing from the incision more than 5 days out from surgery.  Pulling apart of the edges of the incision  Increase in overall body temperature (greater than 100.5 degrees) Make sure you are doing your incentive spirometer and deep breathing exercises every day while awake as this is the most frequent cause of low grade fever post operatively.  Your staple removal will be done in the office at your follow-up visit.                 Discharge Disposition Plan:today to home and home health    Date: 11/16/2017    Anthony Lujan MD         Electronically signed by Anthony Lujan MD at 11/16/2017  8:49 AM

## 2017-11-28 DIAGNOSIS — Z96.652 HISTORY OF TOTAL LEFT KNEE REPLACEMENT: ICD-10-CM

## 2017-11-28 DIAGNOSIS — M79.662 PAIN OF LEFT CALF: Primary | ICD-10-CM

## 2017-11-29 ENCOUNTER — OFFICE VISIT (OUTPATIENT)
Dept: ORTHOPEDIC SURGERY | Facility: CLINIC | Age: 47
End: 2017-11-29

## 2017-11-29 VITALS — WEIGHT: 242 LBS | TEMPERATURE: 97.1 F | BODY MASS INDEX: 32.78 KG/M2 | HEIGHT: 72 IN

## 2017-11-29 DIAGNOSIS — Z96.652 STATUS POST TOTAL LEFT KNEE REPLACEMENT: Primary | ICD-10-CM

## 2017-11-29 PROCEDURE — 73560 X-RAY EXAM OF KNEE 1 OR 2: CPT | Performed by: NURSE PRACTITIONER

## 2017-11-29 PROCEDURE — 99024 POSTOP FOLLOW-UP VISIT: CPT | Performed by: NURSE PRACTITIONER

## 2017-11-29 RX ORDER — HYDROCODONE BITARTRATE AND ACETAMINOPHEN 7.5; 325 MG/1; MG/1
TABLET ORAL
Qty: 100 TABLET | Refills: 0 | Status: SHIPPED | OUTPATIENT
Start: 2017-11-29 | End: 2017-11-29 | Stop reason: SDUPTHER

## 2017-11-29 RX ORDER — HYDROCODONE BITARTRATE AND ACETAMINOPHEN 7.5; 325 MG/1; MG/1
TABLET ORAL
Qty: 100 TABLET | Refills: 0 | Status: SHIPPED | OUTPATIENT
Start: 2017-11-29 | End: 2018-03-01

## 2017-11-29 NOTE — PROGRESS NOTES
Urbano Mares : 1970 MRN: [id number] DATE: 2017  Body mass index is 32.82 kg/(m^2).  Vitals:    17 1349   Temp: 97.1 °F (36.2 °C)       DIAGNOSIS: 2 week follow up left total knee arthroplasty    SUBJECTIVE:Patient returns today for 2 week follow up of left total knee replacement. Patient reports doing well with no unusual complaints. Appears to be progressing appropriately.    OBJECTIVE:   Exam:. The incision is healing appropriately. No sign of infection. Range of motion is progressing as expected -10/108. The calf is soft and nontender with a negative Homans sign.    DIAGNOSTIC STUDIES  Xrays: 2 views of the left knee (AP full length and lateral) were ordered and images were reviewed for evaluation of recent knee replacement. They demonstrate a well positioned, well aligned knee replacement without complicating factors noted. In comparison with previous films there has been interval implant placement.    ASSESSMENT: 2 week status post left knee replacement expected healing.    PLAN: 1) Staples removed and steri strips applied   2) Order given for PT and pain medicine (as needed), may restart meloxicam at this time.    3) Continue EULA hose for four weeks   4) Continue ice PRN   5) Continue EC Aspirin 325mg by mouth twice a day until 6 weeks post op.   6) Follow up in 4 weeks with repeat Xrays of left knee (2 views)   7) Continue with antibiotic prophylaxis with dental procedures for 2 yrs post total joint replacement.    Niurka Barron, APRN  2017     Pain Medications utilized after surgery are narcotics and the law requires that the following information be given to all patients that are prescribed narcotics:    CLASSIFICATION: Pain medications are called Opioids and are narcotics  LEGALITIES: It is illegal to share narcotics with others and to drive within 4 hours of taking narcotics  POTENTIAL SIDE EFFECTS: Potential side effects of opioids include: nausea, vomiting, itching,  dizziness, drowsiness, dry mouth, constipation, and difficulty urinating.  POTENTIAL ADVERSE EFFECTS:   Opioid tolerance can develop with use of pain medications and this simply means that it requires more and more of the medication to control pain; however, this is seen more in patients that use opioids for longer periods of time.  Opioid dependence can develop with use of Opioids and this simply means that to stop the medication can cause withdrawal symptoms so wean gradually (do not stop all at once); however, this is seen more with patients that use opioids for longer periods of time.  Opioid addiction can develop with use of Opioids and the incidence of this is very unlikely in patients who take the medications as ordered and stop the medications as instructed.  Opioid overdose can be dangerous, but is unlikely when the medication is taken as ordered and stopped when ordered. It is important not to mix opioids with alcohol or with and type of sedative such as Benadryl as this can lead to over sedation and respiratory difficulty.    DOSAGE:   Pain medications will need to be taken consistently for the first week to decrease pain and promote adequate pain relief and participation in physical therapy.    After the initial surgical pain begins to resolve, you may begin to decrease the pain medication. By the end of 8 weeks, you should be off of pain medications.    Refills will not be given by the office during evening hours, on weekends.  To seek refills on pain medications during the initial 6 week post-operative period, you must call the office 48 hours in advance to request the refill. The office will then notify you when to  the prescription. DO NOT wait until you are out of the medication to request a refill.

## 2017-11-29 NOTE — PATIENT INSTRUCTIONS
DIAGNOSIS: 2 week follow up left total knee arthroplasty    SUBJECTIVE:Patient returns today for 2 week follow up of left total knee replacement. Patient reports doing well with no unusual complaints. Appears to be progressing appropriately.    OBJECTIVE:   Exam:. The incision is healing appropriately. No sign of infection. Range of motion is progressing as expected. The calf is soft and nontender with a negative Homans sign.    DIAGNOSTIC STUDIES  Xrays: 2 views of the left knee (AP full length and lateral) were ordered and images were reviewed for evaluation of recent knee replacement. They demonstrate a well positioned, well aligned knee replacement without complicating factors noted. In comparison with previous films there has been interval implant placement.    ASSESSMENT: 2 week status post left knee replacement expected healing.    PLAN: 1) Staples removed and steri strips applied   2) Order given for PT and pain medicine (as needed), may restart meloxicam at this time.    3) Continue EULA hose for four weeks   4) Continue ice PRN   5) Continue EC Aspirin 325mg by mouth twice a day until 6 weeks post op.   6) Follow up in 4 weeks with repeat Xrays of left knee (2 views)   7) Continue with antibiotic prophylaxis with dental procedures for 2 yrs post total joint replacement.    Niurka Barron, APRN  11/29/2017     Pain Medications utilized after surgery are narcotics and the law requires that the following information be given to all patients that are prescribed narcotics:    CLASSIFICATION: Pain medications are called Opioids and are narcotics  LEGALITIES: It is illegal to share narcotics with others and to drive within 4 hours of taking narcotics  POTENTIAL SIDE EFFECTS: Potential side effects of opioids include: nausea, vomiting, itching, dizziness, drowsiness, dry mouth, constipation, and difficulty urinating.  POTENTIAL ADVERSE EFFECTS:   Opioid tolerance can develop with use of pain medications and this  simply means that it requires more and more of the medication to control pain; however, this is seen more in patients that use opioids for longer periods of time.  Opioid dependence can develop with use of Opioids and this simply means that to stop the medication can cause withdrawal symptoms so wean gradually (do not stop all at once); however, this is seen more with patients that use opioids for longer periods of time.  Opioid addiction can develop with use of Opioids and the incidence of this is very unlikely in patients who take the medications as ordered and stop the medications as instructed.  Opioid overdose can be dangerous, but is unlikely when the medication is taken as ordered and stopped when ordered. It is important not to mix opioids with alcohol or with and type of sedative such as Benadryl as this can lead to over sedation and respiratory difficulty.    DOSAGE:   Pain medications will need to be taken consistently for the first week to decrease pain and promote adequate pain relief and participation in physical therapy.    After the initial surgical pain begins to resolve, you may begin to decrease the pain medication. By the end of 8 weeks, you should be off of pain medications.    Refills will not be given by the office during evening hours, on weekends.  To seek refills on pain medications during the initial 6 week post-operative period, you must call the office 48 hours in advance to request the refill. The office will then notify you when to  the prescription. DO NOT wait until you are out of the medication to request a refill.

## 2017-11-30 ENCOUNTER — TELEPHONE (OUTPATIENT)
Dept: ORTHOPEDIC SURGERY | Facility: CLINIC | Age: 47
End: 2017-11-30

## 2018-01-04 ENCOUNTER — OFFICE VISIT (OUTPATIENT)
Dept: ORTHOPEDIC SURGERY | Facility: CLINIC | Age: 48
End: 2018-01-04

## 2018-01-04 VITALS — HEIGHT: 72 IN | TEMPERATURE: 98.7 F | WEIGHT: 243 LBS | BODY MASS INDEX: 32.91 KG/M2

## 2018-01-04 DIAGNOSIS — M17.11 ARTHRITIS OF RIGHT KNEE: ICD-10-CM

## 2018-01-04 DIAGNOSIS — Z96.652 STATUS POST TOTAL LEFT KNEE REPLACEMENT: Primary | ICD-10-CM

## 2018-01-04 PROCEDURE — 77077 JOINT SURVEY SINGLE VIEW: CPT | Performed by: NURSE PRACTITIONER

## 2018-01-04 PROCEDURE — 73560 X-RAY EXAM OF KNEE 1 OR 2: CPT | Performed by: NURSE PRACTITIONER

## 2018-01-04 PROCEDURE — 99214 OFFICE O/P EST MOD 30 MIN: CPT | Performed by: NURSE PRACTITIONER

## 2018-01-04 RX ORDER — CEFAZOLIN SODIUM 2 G/100ML
2 INJECTION, SOLUTION INTRAVENOUS ONCE
Status: CANCELLED | OUTPATIENT
Start: 2018-03-12 | End: 2018-03-12

## 2018-01-04 NOTE — PROGRESS NOTES
NAME: Urbano Mares  : 1970   MRN: [id number]   DATE: 2018    CC: 6 weeks Follow up status post total joint replacement, Right knee pain    SUBJECTIVE:    HPI: Patient returns today for a follow up of total joint replacement. Patient reports doing well with no unusual complaints. Appears to be progressing appropriately. He complains that the right knee is hurting more and would like to discuss R TKA this spring.   HPI    This problem is not new to this examiner.     Allergies: No Known Allergies    Medications:   Home Medications:  Current Outpatient Prescriptions on File Prior to Visit   Medication Sig   • chlorproMAZINE (THORAZINE) 25 MG tablet Take 1 tablet by mouth 3 (Three) Times a Day As Needed (hiccups).   • HYDROcodone-acetaminophen (NORCO) 7.5-325 MG per tablet 1-2 tabs po q 3-4 hr prn severe pain, wean as tolerated   • lisinopril (PRINIVIL,ZESTRIL) 10 MG tablet Take 2 tablets by mouth Daily.   • [DISCONTINUED] bisacodyl (DULCOLAX) 5 MG EC tablet Take 2 tablets by mouth Daily As Needed for Constipation.   • [DISCONTINUED] docusate sodium 100 MG capsule Take 100 mg by mouth 2 (Two) Times a Day.   • [DISCONTINUED] ondansetron ODT (ZOFRAN-ODT) 4 MG disintegrating tablet Take 1 tablet by mouth Every 6 (Six) Hours As Needed for Nausea or Vomiting.   • [DISCONTINUED] polyethylene glycol (MIRALAX) pack packet Take 17 g by mouth 2 (Two) Times a Day.     No current facility-administered medications on file prior to visit.        Current Medications:  Scheduled Meds:  Continuous Infusions:  No current facility-administered medications for this visit.   PRN Meds:.    I have reviewed the patient's medical history in detail and updated the computerized patient record.  Review and summarization of old records include:    Past Medical History:   Diagnosis Date   • Arthritis     OSTEO   • Hypertension    • Joint pain    • Left knee pain         Past Surgical History:   Procedure Laterality Date   • TOTAL KNEE  ARTHROPLASTY Left 11/14/2017    Procedure: LEFT TOTAL KNEE ARTHROPLASTY WITH CECILY NAVIGATION;  Surgeon: Anthony Lujan MD;  Location: [facility] MAIN OR;  Service:    • WISDOM TOOTH EXTRACTION          Social History     Occupational History   • Not on file.     Social History Main Topics   • Smoking status: Current Some Day Smoker     Types: Cigars   • Smokeless tobacco: Never Used      Comment: FEW YEARLY    • Alcohol use 3.6 oz/week     3 Cans of beer, 3 Shots of liquor per week   • Drug use: No   • Sexual activity: Defer    Social History     Social History Narrative        Family History   Problem Relation Age of Onset   • Cancer Mother    • Cancer Father      Prostate, Bladder, Brain   • Hypertension Sister    • Hypertension Brother    • Malig Hyperthermia Neg Hx        ROS: 14 point review of systems was performed and was negative except for documented findings in HPI and today's encounter.     Allergies: No Known Allergies  Constitutional:  Denies fever, shaking or chills   Eyes:  Denies change in visual acuity   HENT:  Denies nasal congestion or sore throat   Respiratory:  Denies cough or shortness of breath   Cardiovascular:  Denies chest pain or severe LE edema   GI:  Denies abdominal pain, nausea, vomiting, bloody stools or diarrhea   Musculoskeletal:  Denies numbness tingling or loss of motor function except as outlined above in history of present illness.  : Denies painful urination or hematuria  Integument:  Denies rash, lesion or ulceration   Neurologic:  Denies headache or focal weakness  Endocrine:  Denies lymphadenopathy  Psych:  Denies confusion or change in mental status   Hem:  Denies active bleeding        OBJECTIVE:     Physical Exam:  Vital Signs:  Body mass index is 32.96 kg/(m^2).  Vitals:    01/04/18 1010   Temp: 98.7 °F (37.1 °C)       Constitutional: Awake alert and oriented x3, well developed, well nourished, no acute distress, non-toxic appearance.  HEENT:  Normocephalic,  Atraumatic, Bilateral external ears normal, Oropharynx moist, No oral exudates, Nose normal.   Respiratory:  No respiratory distress, No wheezing  CV: No palpitations  Vascular:  Brisk cap refill, Intact distal pulses, No cyanosis, all compartments soft with no signs or symptoms of compartment syndrome or DVT.  Neurologic: Sensation grossly intact to light touch throughout the involved extremity and bilaterally symmetric, deep tendon reflexes are 2+ and bilaterally symmetric, No focal deficits noted.   Neck:  Normal range of motion, No tenderness, Supple, Negative Spurlings.  Integument: Well hydrated, no rash, warm, dry, no lesions or ulceration.   Musculoskeletal:  Affected extremity post op incision is healed appropriately. No sign of infection. Range of motion is progressing as expected. The calf is soft and nontender with a negative Homans sign. Distal pulses intact. Normal amount of swelling present for recovery time.     Right knee with synovitis swelling and crepitation, calf soft.     DIAGNOSTIC STUDIES  Xrays: 2 views of the left knee (AP full length and lateral) were ordered and reviewed for evaluation of past joint replacement. They demonstrate a well positioned, well aligned total joint replacement without complicating factors noted. In comparison with the film from the last office visit, there has been no alignment change.Prior xrays of rightknee at previous office visit taken for pain show severe bone on bone arthritis.     ASSESSMENT: Status post left total knee replacement with expected healing, right knee severe osteoarthritis    PLAN: 1) Continue home PT exercises as needed.   2) Continue with antibiotic prophylaxis with dental procedures for 2 yrs post total joint replacement.   3) Dr. Lujan in to discuss Right TKA for spring.     25 min spent 18 face to face counseling. Natural history and expected course of this patient's diagnosis discussed along with evaluation of therapies. Questions  answered.  Advised on strengthening exercises, stressed importance of these with progression of arthritis, demonstrated exercises to patient with repeat demonstration and verb of understanding.   TKA: Patient reports no pertinent medical issues needing clearance. Continuation of conservative management vs. TKA: I reviewed anatomy of a total knee arthroplasty in laymen's terms, as well as typical postoperative recovery and possibly 6-12 months for maximal recovery, and possible need for rehabilitation stay after hospitalization. We also discussed risks, benefits, alternatives, and limitations of procedure with risks including but not limited to neurovascular damage, bleeding, infection, malalignment, chronic pian, failure of implants, osteolysis, loosening of implants, loss of motion, weakness, stiffness, instability, DVT, pulmonary embolus, death, stroke, complex regional pain syndrome, myocardial infarction, and need for additional procedures. Concept of substitution vs. replacement discussed.  No guarantees were given regarding results of surgery.  Patient verbalized understanding, and was given the opportunity to ask and have all questions answered today.       1/4/2018  Patient was seen by Dr. Anthony Lujan and JOHN Armstrong in the office today.

## 2018-02-27 ENCOUNTER — TELEPHONE (OUTPATIENT)
Dept: FAMILY MEDICINE CLINIC | Facility: CLINIC | Age: 48
End: 2018-02-27

## 2018-02-27 RX ORDER — LISINOPRIL 10 MG/1
20 TABLET ORAL DAILY
Qty: 180 TABLET | Refills: 0 | Status: SHIPPED | OUTPATIENT
Start: 2018-02-27 | End: 2018-03-05 | Stop reason: DRUGHIGH

## 2018-02-27 NOTE — TELEPHONE ENCOUNTER
----- Message from Belem Fishman sent at 2/27/2018  8:58 AM EST -----  Patient called needs a refill on Lisinopril 10mg #180 sent to [facility]

## 2018-02-27 NOTE — TELEPHONE ENCOUNTER
He needs an appointment to check his blood pressure next month. It's a 6 month check. Please call to schedule. Thank you! 90 day supply has been sent in.

## 2018-03-01 ENCOUNTER — APPOINTMENT (OUTPATIENT)
Dept: PREADMISSION TESTING | Facility: HOSPITAL | Age: 48
End: 2018-03-01

## 2018-03-01 VITALS
SYSTOLIC BLOOD PRESSURE: 133 MMHG | BODY MASS INDEX: 33.83 KG/M2 | WEIGHT: 249.8 LBS | DIASTOLIC BLOOD PRESSURE: 84 MMHG | RESPIRATION RATE: 20 BRPM | HEIGHT: 72 IN | TEMPERATURE: 99.1 F | HEART RATE: 79 BPM | OXYGEN SATURATION: 98 %

## 2018-03-01 DIAGNOSIS — M17.11 ARTHRITIS OF RIGHT KNEE: ICD-10-CM

## 2018-03-01 LAB
ANION GAP SERPL CALCULATED.3IONS-SCNC: 12 MMOL/L
BILIRUB UR QL STRIP: NEGATIVE
BUN BLD-MCNC: 18 MG/DL (ref 6–20)
BUN/CREAT SERPL: 19.4 (ref 7–25)
CALCIUM SPEC-SCNC: 10 MG/DL (ref 8.6–10.5)
CHLORIDE SERPL-SCNC: 101 MMOL/L (ref 98–107)
CLARITY UR: CLEAR
CO2 SERPL-SCNC: 28 MMOL/L (ref 22–29)
COLOR UR: YELLOW
CREAT BLD-MCNC: 0.93 MG/DL (ref 0.76–1.27)
DEPRECATED RDW RBC AUTO: 48.6 FL (ref 37–54)
ERYTHROCYTE [DISTWIDTH] IN BLOOD BY AUTOMATED COUNT: 14.4 % (ref 11.5–14.5)
GFR SERPL CREATININE-BSD FRML MDRD: 86 ML/MIN/1.73
GLUCOSE BLD-MCNC: 140 MG/DL (ref 65–99)
GLUCOSE UR STRIP-MCNC: NEGATIVE MG/DL
HCT VFR BLD AUTO: 48.6 % (ref 40.4–52.2)
HGB BLD-MCNC: 15.2 G/DL (ref 13.7–17.6)
HGB UR QL STRIP.AUTO: NEGATIVE
KETONES UR QL STRIP: NEGATIVE
LEUKOCYTE ESTERASE UR QL STRIP.AUTO: NEGATIVE
MCH RBC QN AUTO: 29.2 PG (ref 27–32.7)
MCHC RBC AUTO-ENTMCNC: 31.3 G/DL (ref 32.6–36.4)
MCV RBC AUTO: 93.5 FL (ref 79.8–96.2)
NITRITE UR QL STRIP: NEGATIVE
PH UR STRIP.AUTO: <=5 [PH] (ref 5–8)
PLATELET # BLD AUTO: 226 10*3/MM3 (ref 140–500)
PMV BLD AUTO: 11 FL (ref 6–12)
POTASSIUM BLD-SCNC: 4.5 MMOL/L (ref 3.5–5.2)
PROT UR QL STRIP: NEGATIVE
RBC # BLD AUTO: 5.2 10*6/MM3 (ref 4.6–6)
SODIUM BLD-SCNC: 141 MMOL/L (ref 136–145)
SP GR UR STRIP: 1.01 (ref 1–1.03)
UROBILINOGEN UR QL STRIP: NORMAL
WBC NRBC COR # BLD: 7.96 10*3/MM3 (ref 4.5–10.7)

## 2018-03-01 PROCEDURE — 36415 COLL VENOUS BLD VENIPUNCTURE: CPT

## 2018-03-01 PROCEDURE — 80048 BASIC METABOLIC PNL TOTAL CA: CPT | Performed by: ORTHOPAEDIC SURGERY

## 2018-03-01 PROCEDURE — 85027 COMPLETE CBC AUTOMATED: CPT | Performed by: ORTHOPAEDIC SURGERY

## 2018-03-01 PROCEDURE — 81003 URINALYSIS AUTO W/O SCOPE: CPT | Performed by: ORTHOPAEDIC SURGERY

## 2018-03-01 RX ORDER — CHLORHEXIDINE GLUCONATE 500 MG/1
CLOTH TOPICAL
COMMUNITY
End: 2018-03-13 | Stop reason: HOSPADM

## 2018-03-01 RX ORDER — HYDROCODONE BITARTRATE AND ACETAMINOPHEN 7.5; 325 MG/1; MG/1
1 TABLET ORAL EVERY 6 HOURS PRN
COMMUNITY
End: 2018-03-13 | Stop reason: HOSPADM

## 2018-03-01 ASSESSMENT — KOOS JR
KOOS JR SCORE: 20
KOOS JR SCORE: 36.931

## 2018-03-01 NOTE — DISCHARGE INSTRUCTIONS
2% CHLORAHEXIDINE GLUCONATE* CLOTH  Preparing or “prepping” skin before surgery can reduce the risk of infection at the surgical site. To make the process easier, [facility] has chosen disposable cloths moistened with a rinse-free, 2% Chlorhexidine Gluconate (CHG) antiseptic solution. The steps below outline the prepping process and should be carefully followed.        Use the prep cloth on the area that is circled in the diagram             Directions Night before Surgery  1) Shower using a fresh bar of anti-bacterial soap (such as Dial) and clean washcloth.  Use a clean towel to completely dry your skin.  2) Do not use any lotions, oils or creams on your skin.  3) Open the package and remove 1 cloth, wipe your skin for 30 seconds in a circular motion.  Allow to dry for 3 minutes.  4) Repeat #3 with second cloth.  5) Do not touch your eyes, ears, or mouth with the prep cloth.  6) Allow the wet prep solution to air dry.  7) Discard the prep cloth and wash your hands with soap and water.   8) Dress in clean bed clothes and sleep on fresh clean bed sheets.   9) You may experience some temporary itching after the prep.    Directions Day of Surgery  1) Repeat steps 1,2,3,4,5,6,7, and 9.   2) Dress in clean clothes before coming to the hospital.    BACTROBAN NASAL OINTMENT  There are many germs normally in your nose. Bactroban is an ointment that will help reduce these germs. Please follow these instructions for Bactroban use:    ____Two days before surgery in the evening Date________    ____The day before surgery in the morning  Date________    ____The day before surgery in the evening              Date________    ____The day of surgery in the morning    Date________    **Squirt ½ package of Bactroban Ointment onto a cotton applicator and apply to inside of 1st nostril.  Squirt the remaining Bactroban and apply to the inside of the other nostril.        Take the following medications the morning of  surgery with a small sip of water:  BRING LISINOPRIL BOTTLE DAY OF SURGERY     ARRIVAL TIME 0900 TO MAIN OR         General Instructions:  • Do not eat solid food after midnight the night before surgery.  • You may drink clear liquids day of surgery but must stop at least one hour before your hospital arrival time.  • It is beneficial for you to have a clear drink that contains carbohydrates the day of surgery.  We suggest a 12 to 20 ounce bottle of Gatorade or Powerade for non-diabetic patients or a 12 to 20 ounce bottle of G2 or Powerade Zero for diabetic patients. (Pediatric patients, are not advised to drink a 12 to 20 ounce carbohydrate drink)    Clear liquids are liquids you can see through.  Nothing red in color.     Plain water                               Sports drinks  Sodas                                   Gelatin (Jell-O)  Fruit juices without pulp such as white grape juice and apple juice  Popsicles that contain no fruit or yogurt  Tea or coffee (no cream or milk added)  Gatorade / Powerade  G2 / Powerade Zero    • Infants may have breast milk up to four hours before surgery.  • Infants drinking formula may drink formula up to six hours before surgery.   • Patients who avoid smoking, chewing tobacco and alcohol for 4 weeks prior to surgery have a reduced risk of post-operative complications.  Quit smoking as many days before surgery as you can.  • Do not smoke, use chewing tobacco or drink alcohol the day of surgery.   • If applicable bring your C-PAP/ BI-PAP machine.  • Bring any papers given to you in the doctor’s office.  • Wear clean comfortable clothes and socks.  • Do not wear contact lenses or make-up.  Bring a case for your glasses.   • Bring crutches or walker if applicable.  • Remove all piercings.  Leave jewelry and any other valuables at home.  • Hair extensions with metal clips must be removed prior to surgery.  • The Pre-Admission Testing nurse will instruct you to bring medications if  unable to obtain an accurate list in Pre-Admission Testing.          Preventing a Surgical Site Infection:  • For 2 to 3 days before surgery, avoid shaving with a razor because the razor can irritate skin and make it easier to develop an infection.  • The night prior to surgery sleep in a clean bed with clean clothing.  Do not allow pets to sleep with you.  • Shower on the morning of surgery using a fresh bar of anti-bacterial soap (such as Dial) and clean washcloth.  Dry with a clean towel and dress in clean clothing.  • Ask your surgeon if you will be receiving antibiotics prior to surgery.  • Make sure you, your family, and all healthcare providers clean their hands with soap and water or an alcohol based hand  before caring for you or your wound.    Day of surgery:  Upon arrival, a Pre-op nurse and Anesthesiologist will review your health history, obtain vital signs, and answer questions you may have.  The only belongings needed at this time will be your home medications and if applicable your C-PAP/BI-PAP machine.  If you are staying overnight your family can leave the rest of your belongings in the car and bring them to your room later.  A Pre-op nurse will start an IV and you may receive medication in preparation for surgery, including something to help you relax.  Your family will be able to see you in the Pre-op area.  While you are in surgery your family should notify the waiting room  if they leave the waiting room area and provide a contact phone number.    Please be aware that surgery does come with discomfort.  We want to make every effort to control your discomfort so please discuss any uncontrolled symptoms with your nurse.   Your doctor will most likely have prescribed pain medications.      If you are going home after surgery you will receive individualized written care instructions before being discharged.  A responsible adult must drive you to and from the hospital on the day  of your surgery and stay with you for 24 hours.    If you are staying overnight following surgery, you will be transported to your hospital room following the recovery period.  [facility] has all private rooms.    If you have any questions please call Pre-Admission Testing at [phone].  Deductibles and co-payments are collected on the day of service. Please be prepared to pay the required co-pay, deductible or deposit on the day of service as defined by your plan.

## 2018-03-02 NOTE — PROGRESS NOTES
Pre-Operative Orthopedic Assessment      Patient: Urbano Mares    YOB: 1970      Age/Gender: 47 y.o. male  Medical Record Number: [id number]  Surgical Procedure Planned: RIGHT TOTAL KNEE ARTHROPLASTY WITH CECILY NAVIGATION     Surgeon: Anthony Lujan MD    Date of Surgery Planned: 03/14/2018    Question Value Score    Patient Score   1. What is your age group? 50-65 years  66-75 years  >75 years =2  =1  =0 2   2. Gender? Male  Female =2  =1 2   3. How far on average can you walk? (a block is 200 meters) Two blocks or more (+\- rest)  1-2 blocks (+\- rest)  Housebound (most of time) =2  =1  =0 0   4. Which gait aid to you use? (more often than not) None  Single-Point Stick  Crutches/Frame =2  =1  =0 1   5. Do you use community  supports? (home help, meals on wheels, district nursing) None or one per week  Two or more per week =1  =0 1   6. Will you live with someone who can care for you after your operation? Yes  No =3  =0 3      Your Score (out of 12)  9     Key Destination at Discharge from acute care predicted by score   Scores < 6  -- extended inpatient rehabilitation   Scores 6-9 -- additional intervention to discharge directly home (Rehabilitation in home)   Scores > 9 -- directly home         Discharge Disposition/Planning:     Patient Response   Discussed the Predicted discharge disposition needed based on RAPT Assessment with the patient.    yes   Patient selected discharge disposition:   home   Out Patient Rehabilitation Facility of Choice:    [facility]   Home Health Services Preferred:   [facility] (plans OP instead of )   Has the patient completed or been scheduled to complete pre-operative testing? yes   Post-Operative Care Giver Name and Phone Number:    Spouse Urmila  [phone]     Subacute Inpatient Rehabilitation:  Complete this section only if planning inpatient services at a Subacute Facility     Patient Response   Subacute Facility  Preferred (Please list 2 facilities:      Requires pre-certification for inpatient rehabilitation services?         Planned source of transportation to inpatient rehabilitation facility?       If choosing inpatient services at an Acute or Subacute Facility please list a subsequent back-up plan (in case patient fails to qualify for inpatient rehabilitation). Back-up plans should include caregiver (family member or friend) for first 24-48 post- -operatively.       Home Equipment Patient Response   Does patient have a walker for home use?    yes   Does patient have a 3 in 1 commode for home use?    no   Does patient have a shower chair for home use?    yes   Does patient have an elevated commode seat for home use? Taller height   Does patient have a cane for home use?    yes   Is there any other medical equipment in the home? If so,  List in comment section below no   Pre-Operative Class Attendance Patient Response   Attended or scheduled to attend the pre-operative class within 1 year of total joint replacement? Yes   11/2017   Not planning to attend the pre-operative class (see comments below)    attended   Patient Education  Completed   Expected time of discharge discussed yes   Encouraged to attend Pre-Operative Class    attended   Education re: Back-up plan for patients planning discharge to subacute facilities n/a   Education re: Predicted Discharge Disposition based on RAPT score    yes   Patient receptive and voiced understanding of information given    yes                                                                                                            Comments:    Address verified.  Patient resides with wife in three level home and will have bed available on main level initially.  Spouse will be available as well as child will be home on college break.  Patient used [facility] and transitioned to OP at [facility].  Patient feels that he will be able to go straight to OP and bypass .                                        Signature: Landy Hartman RN    Date:  3/2/2018

## 2018-03-05 ENCOUNTER — OFFICE VISIT (OUTPATIENT)
Dept: FAMILY MEDICINE CLINIC | Facility: CLINIC | Age: 48
End: 2018-03-05

## 2018-03-05 VITALS
HEART RATE: 60 BPM | BODY MASS INDEX: 34 KG/M2 | DIASTOLIC BLOOD PRESSURE: 80 MMHG | WEIGHT: 251 LBS | HEIGHT: 72 IN | SYSTOLIC BLOOD PRESSURE: 120 MMHG | OXYGEN SATURATION: 95 %

## 2018-03-05 DIAGNOSIS — I10 HYPERTENSION, UNSPECIFIED TYPE: Primary | ICD-10-CM

## 2018-03-05 PROCEDURE — 99213 OFFICE O/P EST LOW 20 MIN: CPT | Performed by: NURSE PRACTITIONER

## 2018-03-05 RX ORDER — LISINOPRIL 20 MG/1
20 TABLET ORAL DAILY
Qty: 30 TABLET | Refills: 3 | Status: SHIPPED | OUTPATIENT
Start: 2018-03-05 | End: 2021-03-01 | Stop reason: SDUPTHER

## 2018-03-05 NOTE — PROGRESS NOTES
"Urbano Mares is a 48 y.o. male.Patient presents for a blood pressure check. Patient denies medication side effects. Had a left knee replacement a couple of weeks ago and is doing fine. Is wanting to know if he has to have post op pt because he has been active since. I told him to discuss with Dr Lujan his orthopedist.  BP has been good since we increased his dose of lisinopril.      Assessment/Plan   Problem List Items Addressed This Visit     None      Visit Diagnoses     Hypertension, unspecified type    -  Primary    Relevant Medications    lisinopril (PRINIVIL,ZESTRIL) 20 MG tablet             No Follow-up on file.  There are no Patient Instructions on file for this visit.    Chief Complaint   Patient presents with   • Hypertension     Social History   Substance Use Topics   • Smoking status: Light Tobacco Smoker     Types: Cigars   • Smokeless tobacco: Never Used      Comment: FEW YEARLY    • Alcohol use Yes      Comment: COUPLE TIMES/ WEEK       History of Present Illness     The following portions of the patient's history were reviewed and updated as appropriate:PMHroutine: Social history , Allergies, Current Medications and Active Problem List    Review of Systems   Constitutional: Positive for activity change. Negative for appetite change.   Cardiovascular: Negative for chest pain and palpitations.   Musculoskeletal: Positive for joint swelling.        Mild left knee swelling   Neurological: Negative for dizziness, light-headedness and headaches.       Objective   Vitals:    03/05/18 0948   BP: 120/80   Pulse: 60   SpO2: 95%   Weight: 114 kg (251 lb)   Height: 182.9 cm (72\")     Body mass index is 34.04 kg/(m^2).  Physical Exam  Reviewed Data:  Appointment on 03/01/2018   Component Date Value Ref Range Status   • Glucose 03/01/2018 140* 65 - 99 mg/dL Final   • BUN 03/01/2018 18  6 - 20 mg/dL Final   • Creatinine 03/01/2018 0.93  0.76 - 1.27 mg/dL Final   • Sodium 03/01/2018 141  136 - 145 mmol/L Final   • " Potassium 03/01/2018 4.5  3.5 - 5.2 mmol/L Final   • Chloride 03/01/2018 101  98 - 107 mmol/L Final   • CO2 03/01/2018 28.0  22.0 - 29.0 mmol/L Final   • Calcium 03/01/2018 10.0  8.6 - 10.5 mg/dL Final   • eGFR Non African Amer 03/01/2018 86  >60 mL/min/1.73 Final   • BUN/Creatinine Ratio 03/01/2018 19.4  7.0 - 25.0 Final   • Anion Gap 03/01/2018 12.0  mmol/L Final   • WBC 03/01/2018 7.96  4.50 - 10.70 10*3/mm3 Final   • RBC 03/01/2018 5.20  4.60 - 6.00 10*6/mm3 Final   • Hemoglobin 03/01/2018 15.2  13.7 - 17.6 g/dL Final   • Hematocrit 03/01/2018 48.6  40.4 - 52.2 % Final   • MCV 03/01/2018 93.5  79.8 - 96.2 fL Final   • MCH 03/01/2018 29.2  27.0 - 32.7 pg Final   • MCHC 03/01/2018 31.3* 32.6 - 36.4 g/dL Final   • RDW 03/01/2018 14.4  11.5 - 14.5 % Final   • RDW-SD 03/01/2018 48.6  37.0 - 54.0 fl Final   • MPV 03/01/2018 11.0  6.0 - 12.0 fL Final   • Platelets 03/01/2018 226  140 - 500 10*3/mm3 Final   • Color, UA 03/01/2018 Yellow  Yellow, Straw Final   • Appearance, UA 03/01/2018 Clear  Clear Final   • pH, UA 03/01/2018 <=5.0  5.0 - 8.0 Final   • Specific Gravity, UA 03/01/2018 1.011  1.005 - 1.030 Final   • Glucose, UA 03/01/2018 Negative  Negative Final   • Ketones, UA 03/01/2018 Negative  Negative Final   • Bilirubin, UA 03/01/2018 Negative  Negative Final   • Blood, UA 03/01/2018 Negative  Negative Final   • Protein, UA 03/01/2018 Negative  Negative Final   • Leuk Esterase, UA 03/01/2018 Negative  Negative Final   • Nitrite, UA 03/01/2018 Negative  Negative Final   • Urobilinogen, UA 03/01/2018 0.2 E.U./dL  0.2 - 1.0 E.U./dL Final     Return for problems

## 2018-03-06 ENCOUNTER — OFFICE VISIT (OUTPATIENT)
Dept: ORTHOPEDIC SURGERY | Facility: CLINIC | Age: 48
End: 2018-03-06

## 2018-03-06 VITALS — TEMPERATURE: 99.1 F | HEIGHT: 72 IN | BODY MASS INDEX: 34 KG/M2 | WEIGHT: 251 LBS

## 2018-03-06 DIAGNOSIS — M17.11 ARTHRITIS OF RIGHT KNEE: Primary | ICD-10-CM

## 2018-03-06 PROCEDURE — S0260 H&P FOR SURGERY: HCPCS | Performed by: NURSE PRACTITIONER

## 2018-03-06 NOTE — PATIENT INSTRUCTIONS

## 2018-03-06 NOTE — PROGRESS NOTES
History & Physical       Patient: Urbano Mares  YOB: 1970  Medical Record Number: [id number]  Body mass index is 34.04 kg/(m^2).    Surgeon:  Dr. Anthony Lujan    Chief Complaints:   Chief Complaint   Patient presents with   • Right Knee - Pre-op Exam, Pain       Subjective:    History of Present Illness: 48 y.o. male presents with   Chief Complaint   Patient presents with   • Right Knee - Pre-op Exam, Pain   . Onset of symptoms was years ago and has been progressively worsening despite more conservative treatment measures.  Symptoms are associated with ability to move, exercise, and perform activities of daily living.  Symptoms are aggravated by weight bearing and ROM necessary for activities of daily living.   Symptoms improve with rest, ice and elevation only minimally.      Allergies: No Known Allergies    Medications:   Home Medications:  Current Outpatient Prescriptions on File Prior to Visit   Medication Sig   • Chlorhexidine Gluconate Cloth 2 % pads Apply  topically. PRIOR  TO OR   • HYDROcodone-acetaminophen (NORCO) 7.5-325 MG per tablet Take 1 tablet by mouth Every 6 (Six) Hours As Needed for Moderate Pain .   • lisinopril (PRINIVIL,ZESTRIL) 20 MG tablet Take 1 tablet by mouth Daily.     No current facility-administered medications on file prior to visit.      Current Medications:  Scheduled Meds:  Continuous Infusions:  No current facility-administered medications for this visit.   PRN Meds:.    I have reviewed the patient's medical history in detail and updated the computerized patient record.  Review and summarization of old records include:    Past Medical History:   Diagnosis Date   • Arthritis     OSTEO   • Hypertension    • Joint pain         Past Surgical History:   Procedure Laterality Date   • TOTAL KNEE ARTHROPLASTY Left 11/14/2017    Procedure: LEFT TOTAL KNEE ARTHROPLASTY WITH CECILY NAVIGATION;  Surgeon: Anthony Lujan MD;  Location: [facility] OR;  Service:    •  WISDOM TOOTH EXTRACTION          Social History     Occupational History   • Not on file.     Social History Main Topics   • Smoking status: Light Tobacco Smoker     Types: Cigars   • Smokeless tobacco: Never Used      Comment: FEW YEARLY    • Alcohol use Yes      Comment: COUPLE TIMES/ WEEK   • Drug use: No   • Sexual activity: Defer    Social History     Social History Narrative        Family History   Problem Relation Age of Onset   • Cancer Mother    • Cancer Father      Prostate, Bladder, Brain   • Hypertension Sister    • Hypertension Brother    • Malig Hyperthermia Neg Hx        ROS: 14 point review of systems was performed and was negative except for documented findings in HPI and today's encounter.     Allergies: No Known Allergies  Constitutional:  Denies fever, shaking or chills   Eyes:  Denies change in visual acuity   HENT:  Denies nasal congestion or sore throat   Respiratory:  Denies cough or shortness of breath   Cardiovascular:  Denies chest pain or severe LE edema   GI:  Denies abdominal pain, nausea, vomiting, bloody stools or diarrhea   Musculoskeletal:  Denies numbness tingling or loss of motor function except as outlined above in history of present illness.  : Denies painful urination or hematuria  Integument:  Denies rash, lesion or ulceration   Neurologic:  Denies headache or focal weakness  Endocrine:  Denies lymphadenopathy  Psych:  Denies confusion or change in mental status   Hem:  Denies active bleeding    Physical Exam: 48 y.o. male  Body mass index is 34.04 kg/(m^2)., @HT@, @WT@  Vitals:    03/06/18 1427   Temp: 99.1 °F (37.3 °C)     Vital signs reviewed.   General Appearance:    Alert, cooperative, in no acute distress                  Eyes: conjunctiva clear  ENT: external ears and nose atraumatic  CV: no peripheral edema  Resp: normal respiratory effort  Skin: no rashes or wounds; normal turgor  Psych: mood and affect appropriate  Lymph: no nodes appreciated  Neuro: gross  sensation intact  Vascular:  Palpable peripheral pulse in noted extremity  Musculoskeletal Extremities: DETAILED KNEE Exam: Right knee: Painful gait w/wo limp, muscle atrophy, erythema, ecchymosis, or gross deformity noted, Large knee effusion, + medial joint line tenderness, Active range of motion normal, 5/5 strength flexion and extension, The knee is stable to varus and valgus stress testing, VARUS VALGUS NEUTRAL: varus alignment of the limb, Lachman negative, Posterior drawer negative, Evy's negative, Patellofemoral grind +, Sensation grossly intact to light tough throughout the lower extremity, Skin is intact, Distal pulses are palpable, No signs or symptoms of DVT          Diagnostic Tests:  Appointment on 03/01/2018   Component Date Value Ref Range Status   • Glucose 03/01/2018 140* 65 - 99 mg/dL Final   • BUN 03/01/2018 18  6 - 20 mg/dL Final   • Creatinine 03/01/2018 0.93  0.76 - 1.27 mg/dL Final   • Sodium 03/01/2018 141  136 - 145 mmol/L Final   • Potassium 03/01/2018 4.5  3.5 - 5.2 mmol/L Final   • Chloride 03/01/2018 101  98 - 107 mmol/L Final   • CO2 03/01/2018 28.0  22.0 - 29.0 mmol/L Final   • Calcium 03/01/2018 10.0  8.6 - 10.5 mg/dL Final   • eGFR Non African Amer 03/01/2018 86  >60 mL/min/1.73 Final   • BUN/Creatinine Ratio 03/01/2018 19.4  7.0 - 25.0 Final   • Anion Gap 03/01/2018 12.0  mmol/L Final   • WBC 03/01/2018 7.96  4.50 - 10.70 10*3/mm3 Final   • RBC 03/01/2018 5.20  4.60 - 6.00 10*6/mm3 Final   • Hemoglobin 03/01/2018 15.2  13.7 - 17.6 g/dL Final   • Hematocrit 03/01/2018 48.6  40.4 - 52.2 % Final   • MCV 03/01/2018 93.5  79.8 - 96.2 fL Final   • MCH 03/01/2018 29.2  27.0 - 32.7 pg Final   • MCHC 03/01/2018 31.3* 32.6 - 36.4 g/dL Final   • RDW 03/01/2018 14.4  11.5 - 14.5 % Final   • RDW-SD 03/01/2018 48.6  37.0 - 54.0 fl Final   • MPV 03/01/2018 11.0  6.0 - 12.0 fL Final   • Platelets 03/01/2018 226  140 - 500 10*3/mm3 Final   • Color, UA 03/01/2018 Yellow  Yellow, Straw Final   •  Appearance, UA 03/01/2018 Clear  Clear Final   • pH, UA 03/01/2018 <=5.0  5.0 - 8.0 Final   • Specific Gravity, UA 03/01/2018 1.011  1.005 - 1.030 Final   • Glucose, UA 03/01/2018 Negative  Negative Final   • Ketones, UA 03/01/2018 Negative  Negative Final   • Bilirubin, UA 03/01/2018 Negative  Negative Final   • Blood, UA 03/01/2018 Negative  Negative Final   • Protein, UA 03/01/2018 Negative  Negative Final   • Leuk Esterase, UA 03/01/2018 Negative  Negative Final   • Nitrite, UA 03/01/2018 Negative  Negative Final   • Urobilinogen, UA 03/01/2018 0.2 E.U./dL  0.2 - 1.0 E.U./dL Final     No results found.    Imaging was done previously in the office, images were personally viewed and discussed at length with the patient:    Indication: pain related symptoms,  Views: 3V AP, LAT & 40 degree PA bilateral knee(s)   Findings: severe end-stage arthritis (bone on bone, subchondral sclerosis/cysts, osteophytes)  Comparison views: viewed last xray done in the office.     Assessment:  Patient Active Problem List   Diagnosis   • Chronic pain of both knees   • Arthritis of left knee   • Arthritis of right knee   • Status post total left knee replacement       Plan:  Dr. Anthony Lujan reviewed anatomy of a total joint arthroplasty in laymen's terms, as well as typical postoperative recovery and possibly 6-12 months for maximal recovery, and possible need for rehabilitation stay after hospitalization. We also discussed risks, benefits, alternatives, and limitations of procedure with risks including but not limited to neurovascular damage, bleeding, infection, malalignment, chronic pian, failure of implants, osteolysis, loosening of implants, loss of motion, weakness, stiffness, instability, DVT, pulmonary embolus, death, stroke, complex regional pain syndrome, myocardial infarction, and need for additional procedures. Concept of substitution vs. replacement discussed.  No guarantees were given regarding results of surgery.       Urbano IVANA Mares was given the opportunity to ask and have all questions answered today.  The patient voiced understanding of the risks, benefits, and alternative forms of treatment that were discussed and the patient consents to proceed with surgery.     Patient's blood clot history is negative.  Planned DVT prophylaxis for surgery:  Aspirin    Discharge Plan: POD 2-3 to home and [facility] wants to do outpatient PT straight from the hospital.     Patient was seen by JOHN Armstrong in the office today.    Date: 3/6/2018  JOHN Hinkle

## 2018-03-12 ENCOUNTER — HOSPITAL ENCOUNTER (INPATIENT)
Facility: HOSPITAL | Age: 48
LOS: 1 days | Discharge: HOME HEALTH | End: 2018-03-13
Attending: ORTHOPAEDIC SURGERY | Admitting: ORTHOPAEDIC SURGERY

## 2018-03-12 ENCOUNTER — ANESTHESIA EVENT (OUTPATIENT)
Dept: PERIOP | Facility: HOSPITAL | Age: 48
End: 2018-03-12

## 2018-03-12 ENCOUNTER — APPOINTMENT (OUTPATIENT)
Dept: GENERAL RADIOLOGY | Facility: HOSPITAL | Age: 48
End: 2018-03-12

## 2018-03-12 ENCOUNTER — ANESTHESIA (OUTPATIENT)
Dept: PERIOP | Facility: HOSPITAL | Age: 48
End: 2018-03-12

## 2018-03-12 DIAGNOSIS — M17.11 ARTHRITIS OF RIGHT KNEE: ICD-10-CM

## 2018-03-12 LAB — GLUCOSE BLDC GLUCOMTR-MCNC: 102 MG/DL (ref 70–130)

## 2018-03-12 PROCEDURE — 0SRC0J9 REPLACEMENT OF RIGHT KNEE JOINT WITH SYNTHETIC SUBSTITUTE, CEMENTED, OPEN APPROACH: ICD-10-PCS | Performed by: ORTHOPAEDIC SURGERY

## 2018-03-12 PROCEDURE — 25010000002 DEXAMETHASONE PER 1 MG: Performed by: NURSE ANESTHETIST, CERTIFIED REGISTERED

## 2018-03-12 PROCEDURE — 25010000002 KETOROLAC TROMETHAMINE PER 15 MG: Performed by: ORTHOPAEDIC SURGERY

## 2018-03-12 PROCEDURE — 82962 GLUCOSE BLOOD TEST: CPT

## 2018-03-12 PROCEDURE — 20985 CPTR-ASST DIR MS PX: CPT | Performed by: ORTHOPAEDIC SURGERY

## 2018-03-12 PROCEDURE — 25010000002 HYDRALAZINE PER 20 MG: Performed by: ANESTHESIOLOGY

## 2018-03-12 PROCEDURE — 25010000002 MIDAZOLAM PER 1 MG: Performed by: ANESTHESIOLOGY

## 2018-03-12 PROCEDURE — 25010000002 PROPOFOL 10 MG/ML EMULSION: Performed by: ANESTHESIOLOGY

## 2018-03-12 PROCEDURE — 25010000002 ONDANSETRON PER 1 MG: Performed by: NURSE ANESTHETIST, CERTIFIED REGISTERED

## 2018-03-12 PROCEDURE — 25010000002 HYDROMORPHONE PER 4 MG: Performed by: ANESTHESIOLOGY

## 2018-03-12 PROCEDURE — 27447 TOTAL KNEE ARTHROPLASTY: CPT | Performed by: NURSE PRACTITIONER

## 2018-03-12 PROCEDURE — 25010000002 ROPIVACAINE PER 1 MG: Performed by: ORTHOPAEDIC SURGERY

## 2018-03-12 PROCEDURE — 73560 X-RAY EXAM OF KNEE 1 OR 2: CPT

## 2018-03-12 PROCEDURE — 27447 TOTAL KNEE ARTHROPLASTY: CPT | Performed by: ORTHOPAEDIC SURGERY

## 2018-03-12 PROCEDURE — C1713 ANCHOR/SCREW BN/BN,TIS/BN: HCPCS | Performed by: ORTHOPAEDIC SURGERY

## 2018-03-12 PROCEDURE — 25010000002 FENTANYL CITRATE (PF) 100 MCG/2ML SOLUTION: Performed by: ANESTHESIOLOGY

## 2018-03-12 PROCEDURE — 25010000002 EPINEPHRINE PER 0.1 MG: Performed by: ORTHOPAEDIC SURGERY

## 2018-03-12 PROCEDURE — 25010000002 FENTANYL CITRATE (PF) 100 MCG/2ML SOLUTION

## 2018-03-12 PROCEDURE — 25010000002 HYDROMORPHONE PER 4 MG: Performed by: NURSE ANESTHETIST, CERTIFIED REGISTERED

## 2018-03-12 PROCEDURE — G0378 HOSPITAL OBSERVATION PER HR: HCPCS

## 2018-03-12 PROCEDURE — 25010000003 CEFAZOLIN IN DEXTROSE 2-4 GM/100ML-% SOLUTION: Performed by: ORTHOPAEDIC SURGERY

## 2018-03-12 PROCEDURE — C1776 JOINT DEVICE (IMPLANTABLE): HCPCS | Performed by: ORTHOPAEDIC SURGERY

## 2018-03-12 PROCEDURE — 8E0YXBZ COMPUTER ASSISTED PROCEDURE OF LOWER EXTREMITY: ICD-10-PCS | Performed by: ORTHOPAEDIC SURGERY

## 2018-03-12 DEVICE — P.F.C. SIGMA OVAL DOME PATELLA 3-PEG 38MM CEMENTED
Type: IMPLANTABLE DEVICE | Site: KNEE | Status: FUNCTIONAL
Brand: P.F.C. SIGMA

## 2018-03-12 DEVICE — SIGMA FEMORAL CRUCIATE RETAINING CEMENTED 4 RIGHT
Type: IMPLANTABLE DEVICE | Site: KNEE | Status: FUNCTIONAL
Brand: SIGMA

## 2018-03-12 DEVICE — SMARTSET GMV HIGH PERFORMANCE GENTAMICIN MEDIUM VISCOSITY BONE CEMENT 40G
Type: IMPLANTABLE DEVICE | Site: KNEE | Status: FUNCTIONAL
Brand: SMARTSET

## 2018-03-12 DEVICE — P.F.C. SIGMA TIBIAL TRAY FIXED BEARING MODULAR COCR 4 CEMENTED
Type: IMPLANTABLE DEVICE | Site: KNEE | Status: FUNCTIONAL
Brand: P.F.C. SIGMA

## 2018-03-12 DEVICE — SIGMA TIBIAL INSERT FIXED BEARING CURVED PLUS 4 10MM XLK
Type: IMPLANTABLE DEVICE | Site: KNEE | Status: FUNCTIONAL
Brand: SIGMA

## 2018-03-12 DEVICE — IMPLANTABLE DEVICE: Type: IMPLANTABLE DEVICE | Site: KNEE | Status: FUNCTIONAL

## 2018-03-12 RX ORDER — MIDAZOLAM HYDROCHLORIDE 1 MG/ML
2 INJECTION INTRAMUSCULAR; INTRAVENOUS
Status: DISCONTINUED | OUTPATIENT
Start: 2018-03-12 | End: 2018-03-12 | Stop reason: HOSPADM

## 2018-03-12 RX ORDER — ROCURONIUM BROMIDE 10 MG/ML
INJECTION, SOLUTION INTRAVENOUS AS NEEDED
Status: DISCONTINUED | OUTPATIENT
Start: 2018-03-12 | End: 2018-03-12 | Stop reason: SURG

## 2018-03-12 RX ORDER — FLUMAZENIL 0.1 MG/ML
0.2 INJECTION INTRAVENOUS AS NEEDED
Status: CANCELLED | OUTPATIENT
Start: 2018-03-12

## 2018-03-12 RX ORDER — DIPHENHYDRAMINE HYDROCHLORIDE 50 MG/ML
12.5 INJECTION INTRAMUSCULAR; INTRAVENOUS
Status: DISCONTINUED | OUTPATIENT
Start: 2018-03-12 | End: 2018-03-12 | Stop reason: HOSPADM

## 2018-03-12 RX ORDER — HYDRALAZINE HYDROCHLORIDE 20 MG/ML
5 INJECTION INTRAMUSCULAR; INTRAVENOUS
Status: CANCELLED | OUTPATIENT
Start: 2018-03-12

## 2018-03-12 RX ORDER — BISACODYL 10 MG
10 SUPPOSITORY, RECTAL RECTAL DAILY PRN
Status: DISCONTINUED | OUTPATIENT
Start: 2018-03-12 | End: 2018-03-13 | Stop reason: HOSPADM

## 2018-03-12 RX ORDER — OXYCODONE AND ACETAMINOPHEN 7.5; 325 MG/1; MG/1
1 TABLET ORAL
Status: DISCONTINUED | OUTPATIENT
Start: 2018-03-12 | End: 2018-03-13 | Stop reason: HOSPADM

## 2018-03-12 RX ORDER — PROMETHAZINE HYDROCHLORIDE 25 MG/ML
12.5 INJECTION, SOLUTION INTRAMUSCULAR; INTRAVENOUS ONCE AS NEEDED
Status: CANCELLED | OUTPATIENT
Start: 2018-03-12

## 2018-03-12 RX ORDER — LISINOPRIL 20 MG/1
20 TABLET ORAL DAILY
Status: DISCONTINUED | OUTPATIENT
Start: 2018-03-12 | End: 2018-03-13 | Stop reason: HOSPADM

## 2018-03-12 RX ORDER — DEXAMETHASONE SODIUM PHOSPHATE 10 MG/ML
INJECTION INTRAMUSCULAR; INTRAVENOUS AS NEEDED
Status: DISCONTINUED | OUTPATIENT
Start: 2018-03-12 | End: 2018-03-12 | Stop reason: SURG

## 2018-03-12 RX ORDER — BISACODYL 5 MG/1
10 TABLET, DELAYED RELEASE ORAL DAILY PRN
Status: DISCONTINUED | OUTPATIENT
Start: 2018-03-12 | End: 2018-03-13 | Stop reason: HOSPADM

## 2018-03-12 RX ORDER — OXYCODONE AND ACETAMINOPHEN 7.5; 325 MG/1; MG/1
1 TABLET ORAL ONCE AS NEEDED
Status: COMPLETED | OUTPATIENT
Start: 2018-03-12 | End: 2018-03-12

## 2018-03-12 RX ORDER — DIPHENHYDRAMINE HYDROCHLORIDE 50 MG/ML
25 INJECTION INTRAMUSCULAR; INTRAVENOUS EVERY 6 HOURS PRN
Status: DISCONTINUED | OUTPATIENT
Start: 2018-03-12 | End: 2018-03-13 | Stop reason: HOSPADM

## 2018-03-12 RX ORDER — ACETAMINOPHEN 325 MG/1
325 TABLET ORAL EVERY 4 HOURS PRN
Status: DISCONTINUED | OUTPATIENT
Start: 2018-03-12 | End: 2018-03-13 | Stop reason: HOSPADM

## 2018-03-12 RX ORDER — PROMETHAZINE HYDROCHLORIDE 25 MG/ML
12.5 INJECTION, SOLUTION INTRAMUSCULAR; INTRAVENOUS ONCE AS NEEDED
Status: DISCONTINUED | OUTPATIENT
Start: 2018-03-12 | End: 2018-03-12 | Stop reason: HOSPADM

## 2018-03-12 RX ORDER — PROMETHAZINE HYDROCHLORIDE 25 MG/1
12.5 TABLET ORAL ONCE AS NEEDED
Status: CANCELLED | OUTPATIENT
Start: 2018-03-12 | End: 2018-03-13

## 2018-03-12 RX ORDER — FAMOTIDINE 10 MG/ML
20 INJECTION, SOLUTION INTRAVENOUS ONCE
Status: COMPLETED | OUTPATIENT
Start: 2018-03-12 | End: 2018-03-12

## 2018-03-12 RX ORDER — PROPOFOL 10 MG/ML
VIAL (ML) INTRAVENOUS AS NEEDED
Status: DISCONTINUED | OUTPATIENT
Start: 2018-03-12 | End: 2018-03-12 | Stop reason: SURG

## 2018-03-12 RX ORDER — OXYCODONE AND ACETAMINOPHEN 7.5; 325 MG/1; MG/1
1 TABLET ORAL ONCE AS NEEDED
Status: CANCELLED | OUTPATIENT
Start: 2018-03-12 | End: 2018-03-13

## 2018-03-12 RX ORDER — MIDAZOLAM HYDROCHLORIDE 1 MG/ML
1 INJECTION INTRAMUSCULAR; INTRAVENOUS
Status: DISCONTINUED | OUTPATIENT
Start: 2018-03-12 | End: 2018-03-12 | Stop reason: HOSPADM

## 2018-03-12 RX ORDER — LIDOCAINE HYDROCHLORIDE 10 MG/ML
0.5 INJECTION, SOLUTION EPIDURAL; INFILTRATION; INTRACAUDAL; PERINEURAL ONCE AS NEEDED
Status: DISCONTINUED | OUTPATIENT
Start: 2018-03-12 | End: 2018-03-12 | Stop reason: HOSPADM

## 2018-03-12 RX ORDER — HYDRALAZINE HYDROCHLORIDE 20 MG/ML
5 INJECTION INTRAMUSCULAR; INTRAVENOUS
Status: DISCONTINUED | OUTPATIENT
Start: 2018-03-12 | End: 2018-03-12 | Stop reason: HOSPADM

## 2018-03-12 RX ORDER — NALOXONE HCL 0.4 MG/ML
0.1 VIAL (ML) INJECTION
Status: DISCONTINUED | OUTPATIENT
Start: 2018-03-12 | End: 2018-03-13 | Stop reason: HOSPADM

## 2018-03-12 RX ORDER — NALOXONE HCL 0.4 MG/ML
0.2 VIAL (ML) INJECTION AS NEEDED
Status: CANCELLED | OUTPATIENT
Start: 2018-03-12

## 2018-03-12 RX ORDER — HYDROMORPHONE HCL 110MG/55ML
PATIENT CONTROLLED ANALGESIA SYRINGE INTRAVENOUS AS NEEDED
Status: DISCONTINUED | OUTPATIENT
Start: 2018-03-12 | End: 2018-03-12 | Stop reason: SURG

## 2018-03-12 RX ORDER — LABETALOL HYDROCHLORIDE 5 MG/ML
5 INJECTION, SOLUTION INTRAVENOUS
Status: DISCONTINUED | OUTPATIENT
Start: 2018-03-12 | End: 2018-03-12 | Stop reason: HOSPADM

## 2018-03-12 RX ORDER — ONDANSETRON 2 MG/ML
INJECTION INTRAMUSCULAR; INTRAVENOUS AS NEEDED
Status: DISCONTINUED | OUTPATIENT
Start: 2018-03-12 | End: 2018-03-12 | Stop reason: SURG

## 2018-03-12 RX ORDER — ASPIRIN 325 MG
325 TABLET, DELAYED RELEASE (ENTERIC COATED) ORAL EVERY 12 HOURS SCHEDULED
Status: DISCONTINUED | OUTPATIENT
Start: 2018-03-12 | End: 2018-03-13 | Stop reason: HOSPADM

## 2018-03-12 RX ORDER — HYDROMORPHONE HCL 110MG/55ML
0.5 PATIENT CONTROLLED ANALGESIA SYRINGE INTRAVENOUS
Status: DISCONTINUED | OUTPATIENT
Start: 2018-03-12 | End: 2018-03-12 | Stop reason: HOSPADM

## 2018-03-12 RX ORDER — DIPHENHYDRAMINE HCL 25 MG
25 CAPSULE ORAL EVERY 6 HOURS PRN
Status: DISCONTINUED | OUTPATIENT
Start: 2018-03-12 | End: 2018-03-13 | Stop reason: HOSPADM

## 2018-03-12 RX ORDER — HYDROCODONE BITARTRATE AND ACETAMINOPHEN 7.5; 325 MG/1; MG/1
1 TABLET ORAL ONCE AS NEEDED
Status: DISCONTINUED | OUTPATIENT
Start: 2018-03-12 | End: 2018-03-12 | Stop reason: HOSPADM

## 2018-03-12 RX ORDER — SODIUM CHLORIDE, SODIUM LACTATE, POTASSIUM CHLORIDE, CALCIUM CHLORIDE 600; 310; 30; 20 MG/100ML; MG/100ML; MG/100ML; MG/100ML
9 INJECTION, SOLUTION INTRAVENOUS CONTINUOUS
Status: DISCONTINUED | OUTPATIENT
Start: 2018-03-12 | End: 2018-03-13 | Stop reason: HOSPADM

## 2018-03-12 RX ORDER — FENTANYL CITRATE 50 UG/ML
INJECTION, SOLUTION INTRAMUSCULAR; INTRAVENOUS
Status: COMPLETED
Start: 2018-03-12 | End: 2018-03-12

## 2018-03-12 RX ORDER — PROMETHAZINE HYDROCHLORIDE 25 MG/1
25 SUPPOSITORY RECTAL ONCE AS NEEDED
Status: DISCONTINUED | OUTPATIENT
Start: 2018-03-12 | End: 2018-03-12 | Stop reason: HOSPADM

## 2018-03-12 RX ORDER — MAGNESIUM HYDROXIDE 1200 MG/15ML
LIQUID ORAL AS NEEDED
Status: DISCONTINUED | OUTPATIENT
Start: 2018-03-12 | End: 2018-03-12 | Stop reason: HOSPADM

## 2018-03-12 RX ORDER — ONDANSETRON 2 MG/ML
4 INJECTION INTRAMUSCULAR; INTRAVENOUS ONCE AS NEEDED
Status: CANCELLED | OUTPATIENT
Start: 2018-03-12

## 2018-03-12 RX ORDER — CEFAZOLIN SODIUM 2 G/100ML
2 INJECTION, SOLUTION INTRAVENOUS ONCE
Status: COMPLETED | OUTPATIENT
Start: 2018-03-12 | End: 2018-03-12

## 2018-03-12 RX ORDER — PROMETHAZINE HYDROCHLORIDE 25 MG/1
25 TABLET ORAL ONCE AS NEEDED
Status: CANCELLED | OUTPATIENT
Start: 2018-03-12

## 2018-03-12 RX ORDER — PROMETHAZINE HYDROCHLORIDE 25 MG/1
25 SUPPOSITORY RECTAL ONCE AS NEEDED
Status: CANCELLED | OUTPATIENT
Start: 2018-03-12

## 2018-03-12 RX ORDER — LIDOCAINE HYDROCHLORIDE 20 MG/ML
INJECTION, SOLUTION INFILTRATION; PERINEURAL AS NEEDED
Status: DISCONTINUED | OUTPATIENT
Start: 2018-03-12 | End: 2018-03-12 | Stop reason: SURG

## 2018-03-12 RX ORDER — CEFAZOLIN SODIUM 2 G/100ML
2 INJECTION, SOLUTION INTRAVENOUS EVERY 8 HOURS
Status: COMPLETED | OUTPATIENT
Start: 2018-03-12 | End: 2018-03-13

## 2018-03-12 RX ORDER — PROMETHAZINE HYDROCHLORIDE 12.5 MG/1
12.5 TABLET ORAL EVERY 6 HOURS PRN
Status: DISCONTINUED | OUTPATIENT
Start: 2018-03-12 | End: 2018-03-13 | Stop reason: HOSPADM

## 2018-03-12 RX ORDER — ONDANSETRON 4 MG/1
4 TABLET, ORALLY DISINTEGRATING ORAL EVERY 6 HOURS PRN
Status: DISCONTINUED | OUTPATIENT
Start: 2018-03-12 | End: 2018-03-13 | Stop reason: HOSPADM

## 2018-03-12 RX ORDER — TRANEXAMIC ACID 100 MG/ML
INJECTION, SOLUTION INTRAVENOUS AS NEEDED
Status: DISCONTINUED | OUTPATIENT
Start: 2018-03-12 | End: 2018-03-12 | Stop reason: HOSPADM

## 2018-03-12 RX ORDER — EPHEDRINE SULFATE 50 MG/ML
5 INJECTION, SOLUTION INTRAVENOUS ONCE AS NEEDED
Status: DISCONTINUED | OUTPATIENT
Start: 2018-03-12 | End: 2018-03-12 | Stop reason: HOSPADM

## 2018-03-12 RX ORDER — HYDROCODONE BITARTRATE AND ACETAMINOPHEN 7.5; 325 MG/1; MG/1
1 TABLET ORAL ONCE AS NEEDED
Status: CANCELLED | OUTPATIENT
Start: 2018-03-12 | End: 2018-03-13

## 2018-03-12 RX ORDER — HYDROMORPHONE HCL 110MG/55ML
0.5 PATIENT CONTROLLED ANALGESIA SYRINGE INTRAVENOUS
Status: CANCELLED | OUTPATIENT
Start: 2018-03-12

## 2018-03-12 RX ORDER — SODIUM CHLORIDE, SODIUM LACTATE, POTASSIUM CHLORIDE, CALCIUM CHLORIDE 600; 310; 30; 20 MG/100ML; MG/100ML; MG/100ML; MG/100ML
100 INJECTION, SOLUTION INTRAVENOUS CONTINUOUS
Status: DISCONTINUED | OUTPATIENT
Start: 2018-03-12 | End: 2018-03-13 | Stop reason: HOSPADM

## 2018-03-12 RX ORDER — SODIUM CHLORIDE 0.9 % (FLUSH) 0.9 %
1-10 SYRINGE (ML) INJECTION AS NEEDED
Status: DISCONTINUED | OUTPATIENT
Start: 2018-03-12 | End: 2018-03-12 | Stop reason: HOSPADM

## 2018-03-12 RX ORDER — ONDANSETRON 2 MG/ML
4 INJECTION INTRAMUSCULAR; INTRAVENOUS ONCE AS NEEDED
Status: DISCONTINUED | OUTPATIENT
Start: 2018-03-12 | End: 2018-03-12 | Stop reason: HOSPADM

## 2018-03-12 RX ORDER — ONDANSETRON 2 MG/ML
4 INJECTION INTRAMUSCULAR; INTRAVENOUS EVERY 6 HOURS PRN
Status: DISCONTINUED | OUTPATIENT
Start: 2018-03-12 | End: 2018-03-13 | Stop reason: HOSPADM

## 2018-03-12 RX ORDER — FLUMAZENIL 0.1 MG/ML
0.2 INJECTION INTRAVENOUS AS NEEDED
Status: DISCONTINUED | OUTPATIENT
Start: 2018-03-12 | End: 2018-03-12 | Stop reason: HOSPADM

## 2018-03-12 RX ORDER — PROMETHAZINE HYDROCHLORIDE 25 MG/1
25 TABLET ORAL ONCE AS NEEDED
Status: DISCONTINUED | OUTPATIENT
Start: 2018-03-12 | End: 2018-03-12 | Stop reason: HOSPADM

## 2018-03-12 RX ORDER — PROMETHAZINE HYDROCHLORIDE 25 MG/1
12.5 TABLET ORAL ONCE AS NEEDED
Status: DISCONTINUED | OUTPATIENT
Start: 2018-03-12 | End: 2018-03-12 | Stop reason: HOSPADM

## 2018-03-12 RX ORDER — DOCUSATE SODIUM 100 MG/1
100 CAPSULE, LIQUID FILLED ORAL 2 TIMES DAILY
Status: DISCONTINUED | OUTPATIENT
Start: 2018-03-12 | End: 2018-03-13 | Stop reason: HOSPADM

## 2018-03-12 RX ORDER — ESMOLOL HYDROCHLORIDE 10 MG/ML
INJECTION INTRAVENOUS AS NEEDED
Status: DISCONTINUED | OUTPATIENT
Start: 2018-03-12 | End: 2018-03-12 | Stop reason: SURG

## 2018-03-12 RX ORDER — LABETALOL HYDROCHLORIDE 5 MG/ML
5 INJECTION, SOLUTION INTRAVENOUS
Status: CANCELLED | OUTPATIENT
Start: 2018-03-12

## 2018-03-12 RX ORDER — NALOXONE HCL 0.4 MG/ML
0.2 VIAL (ML) INJECTION AS NEEDED
Status: DISCONTINUED | OUTPATIENT
Start: 2018-03-12 | End: 2018-03-12 | Stop reason: HOSPADM

## 2018-03-12 RX ORDER — FENTANYL CITRATE 50 UG/ML
50 INJECTION, SOLUTION INTRAMUSCULAR; INTRAVENOUS
Status: CANCELLED | OUTPATIENT
Start: 2018-03-12

## 2018-03-12 RX ORDER — OXYCODONE AND ACETAMINOPHEN 7.5; 325 MG/1; MG/1
2 TABLET ORAL
Status: DISCONTINUED | OUTPATIENT
Start: 2018-03-12 | End: 2018-03-13 | Stop reason: HOSPADM

## 2018-03-12 RX ORDER — DIPHENHYDRAMINE HYDROCHLORIDE 50 MG/ML
12.5 INJECTION INTRAMUSCULAR; INTRAVENOUS
Status: CANCELLED | OUTPATIENT
Start: 2018-03-12

## 2018-03-12 RX ORDER — ONDANSETRON 4 MG/1
4 TABLET, FILM COATED ORAL EVERY 6 HOURS PRN
Status: DISCONTINUED | OUTPATIENT
Start: 2018-03-12 | End: 2018-03-13 | Stop reason: HOSPADM

## 2018-03-12 RX ORDER — FENTANYL CITRATE 50 UG/ML
50 INJECTION, SOLUTION INTRAMUSCULAR; INTRAVENOUS
Status: DISCONTINUED | OUTPATIENT
Start: 2018-03-12 | End: 2018-03-12 | Stop reason: HOSPADM

## 2018-03-12 RX ORDER — EPHEDRINE SULFATE 50 MG/ML
5 INJECTION, SOLUTION INTRAVENOUS ONCE AS NEEDED
Status: CANCELLED | OUTPATIENT
Start: 2018-03-12

## 2018-03-12 RX ORDER — HYDROMORPHONE HYDROCHLORIDE 1 MG/ML
0.5 INJECTION, SOLUTION INTRAMUSCULAR; INTRAVENOUS; SUBCUTANEOUS
Status: DISCONTINUED | OUTPATIENT
Start: 2018-03-12 | End: 2018-03-13 | Stop reason: HOSPADM

## 2018-03-12 RX ADMIN — Medication 5 MG: at 15:21

## 2018-03-12 RX ADMIN — FENTANYL CITRATE 50 MCG: 50 INJECTION INTRAMUSCULAR; INTRAVENOUS at 16:21

## 2018-03-12 RX ADMIN — CEFAZOLIN SODIUM 2 G: 2 INJECTION, SOLUTION INTRAVENOUS at 23:27

## 2018-03-12 RX ADMIN — SODIUM CHLORIDE, POTASSIUM CHLORIDE, SODIUM LACTATE AND CALCIUM CHLORIDE: 600; 310; 30; 20 INJECTION, SOLUTION INTRAVENOUS at 14:58

## 2018-03-12 RX ADMIN — FENTANYL CITRATE 25 MCG: 50 INJECTION INTRAMUSCULAR; INTRAVENOUS at 13:34

## 2018-03-12 RX ADMIN — ROCURONIUM BROMIDE 10 MG: 10 INJECTION INTRAVENOUS at 13:56

## 2018-03-12 RX ADMIN — FENTANYL CITRATE 50 MCG: 50 INJECTION INTRAMUSCULAR; INTRAVENOUS at 13:39

## 2018-03-12 RX ADMIN — SODIUM CHLORIDE, POTASSIUM CHLORIDE, SODIUM LACTATE AND CALCIUM CHLORIDE: 600; 310; 30; 20 INJECTION, SOLUTION INTRAVENOUS at 12:55

## 2018-03-12 RX ADMIN — Medication 5 MG: at 15:32

## 2018-03-12 RX ADMIN — ESMOLOL HYDROCHLORIDE 30 MG: 10 INJECTION, SOLUTION INTRAVENOUS at 13:41

## 2018-03-12 RX ADMIN — LIDOCAINE HYDROCHLORIDE 50 MG: 20 INJECTION, SOLUTION INFILTRATION; PERINEURAL at 13:01

## 2018-03-12 RX ADMIN — MUPIROCIN 1 APPLICATION: 20 OINTMENT TOPICAL at 20:38

## 2018-03-12 RX ADMIN — DEXAMETHASONE SODIUM PHOSPHATE 8 MG: 10 INJECTION INTRAMUSCULAR; INTRAVENOUS at 13:39

## 2018-03-12 RX ADMIN — ESMOLOL HYDROCHLORIDE 20 MG: 10 INJECTION, SOLUTION INTRAVENOUS at 14:15

## 2018-03-12 RX ADMIN — FENTANYL CITRATE 50 MCG: 50 INJECTION INTRAMUSCULAR; INTRAVENOUS at 15:15

## 2018-03-12 RX ADMIN — SODIUM CHLORIDE, POTASSIUM CHLORIDE, SODIUM LACTATE AND CALCIUM CHLORIDE 9 ML/HR: 600; 310; 30; 20 INJECTION, SOLUTION INTRAVENOUS at 11:30

## 2018-03-12 RX ADMIN — CEFAZOLIN SODIUM 2 G: 2 INJECTION, SOLUTION INTRAVENOUS at 12:55

## 2018-03-12 RX ADMIN — FENTANYL CITRATE 100 MCG: 50 INJECTION INTRAMUSCULAR; INTRAVENOUS at 12:58

## 2018-03-12 RX ADMIN — HYDROMORPHONE HYDROCHLORIDE 0.5 MG: 2 INJECTION INTRAMUSCULAR; INTRAVENOUS; SUBCUTANEOUS at 16:30

## 2018-03-12 RX ADMIN — HYDROMORPHONE HYDROCHLORIDE 0.1 MG: 2 INJECTION INTRAMUSCULAR; INTRAVENOUS; SUBCUTANEOUS at 13:50

## 2018-03-12 RX ADMIN — OXYCODONE HYDROCHLORIDE AND ACETAMINOPHEN 2 TABLET: 7.5; 325 TABLET ORAL at 23:27

## 2018-03-12 RX ADMIN — FAMOTIDINE 20 MG: 10 INJECTION INTRAVENOUS at 11:30

## 2018-03-12 RX ADMIN — POLYETHYLENE GLYCOL 3350 17 G: 17 POWDER, FOR SOLUTION ORAL at 20:38

## 2018-03-12 RX ADMIN — OXYCODONE HYDROCHLORIDE AND ACETAMINOPHEN 1 TABLET: 7.5; 325 TABLET ORAL at 15:30

## 2018-03-12 RX ADMIN — HYDROMORPHONE HYDROCHLORIDE 0.2 MG: 2 INJECTION INTRAMUSCULAR; INTRAVENOUS; SUBCUTANEOUS at 14:27

## 2018-03-12 RX ADMIN — ONDANSETRON 4 MG: 2 INJECTION INTRAMUSCULAR; INTRAVENOUS at 14:15

## 2018-03-12 RX ADMIN — LISINOPRIL 20 MG: 20 TABLET ORAL at 20:40

## 2018-03-12 RX ADMIN — FENTANYL CITRATE 50 MCG: 50 INJECTION INTRAMUSCULAR; INTRAVENOUS at 15:43

## 2018-03-12 RX ADMIN — MIDAZOLAM 2 MG: 1 INJECTION INTRAMUSCULAR; INTRAVENOUS at 11:30

## 2018-03-12 RX ADMIN — HYDROMORPHONE HYDROCHLORIDE 0.5 MG: 2 INJECTION INTRAMUSCULAR; INTRAVENOUS; SUBCUTANEOUS at 16:06

## 2018-03-12 RX ADMIN — DOCUSATE SODIUM 100 MG: 100 CAPSULE, LIQUID FILLED ORAL at 20:38

## 2018-03-12 RX ADMIN — PROPOFOL 200 MG: 10 INJECTION, EMULSION INTRAVENOUS at 13:01

## 2018-03-12 RX ADMIN — ESMOLOL HYDROCHLORIDE 20 MG: 10 INJECTION, SOLUTION INTRAVENOUS at 14:48

## 2018-03-12 RX ADMIN — FENTANYL CITRATE 25 MCG: 50 INJECTION INTRAMUSCULAR; INTRAVENOUS at 13:30

## 2018-03-12 RX ADMIN — OXYCODONE HYDROCHLORIDE AND ACETAMINOPHEN 2 TABLET: 7.5; 325 TABLET ORAL at 19:29

## 2018-03-12 RX ADMIN — ASPIRIN 325 MG: 325 TABLET, DELAYED RELEASE ORAL at 20:38

## 2018-03-12 RX ADMIN — HYDROMORPHONE HYDROCHLORIDE 0.5 MG: 2 INJECTION INTRAMUSCULAR; INTRAVENOUS; SUBCUTANEOUS at 15:23

## 2018-03-12 RX ADMIN — HYDROMORPHONE HYDROCHLORIDE 0.3 MG: 2 INJECTION INTRAMUSCULAR; INTRAVENOUS; SUBCUTANEOUS at 14:18

## 2018-03-12 RX ADMIN — CEFAZOLIN SODIUM 2 G: 2 INJECTION, SOLUTION INTRAVENOUS at 15:49

## 2018-03-12 RX ADMIN — ESMOLOL HYDROCHLORIDE 20 MG: 10 INJECTION, SOLUTION INTRAVENOUS at 13:55

## 2018-03-12 RX ADMIN — HYDROMORPHONE HYDROCHLORIDE 0.4 MG: 2 INJECTION INTRAMUSCULAR; INTRAVENOUS; SUBCUTANEOUS at 13:45

## 2018-03-12 NOTE — ANESTHESIA PROCEDURE NOTES
Airway  Urgency: elective    Date/Time: 3/12/2018 1:01 PM  Airway not difficult    General Information and Staff    Patient location during procedure: OR  Anesthesiologist: RENITA KNOWLES    Indications and Patient Condition  Indications for airway management: airway protection    Preoxygenated: yes  MILS maintained throughout  Mask difficulty assessment: 1 - vent by mask    Final Airway Details  Final airway type: supraglottic airway      Successful airway: classic  Size 5    Number of attempts at approach: 1

## 2018-03-12 NOTE — OP NOTE
Operative Note    Name: Urbano Mares  YOB: 1970  MRN: [id number]  BMI: Body mass index is 34.06 kg/m².    DATE OF SURGERY: 3/12/2018    PREOPERATIVE DIAGNOSIS: right knee end-stage osteoarthritis    POSTOPERATIVE DIAGNOSIS: right knee end-stage osteoarthritis    PROCEDURE PERFORMED: right total knee replacement with Jamaal navigation    SURGEON: Dr. Anthony Lujan    ASSISTANT: JOHN Armstrong    IMPLANTS: Depuy PFC size 4R femoral component, size 4 tibial baseplate, 10mm polyethylene insert, 38 mm patellar button    Estimated Blood Loss: 100cc  Specimens : none  Complications: none  22 Modifier:  none    DESCRIPTION OF PROCEDURE: The patient was taken to the operating room and placed in the supine position. Preoperative antibiotics were administered. Surgical time out was performed. After adequate induction of anesthesia, the leg was prepped and draped in the usual sterile fashion.  Surgical time out was performed. The leg was exsanguinated with an Esmarch bandage and the tourniquet inflated to 250 mmHg. A midline incision was performed followed by a medial parapatellar arthrotomy. The patella was subluxed laterally.  A portion of the fat pad, ACL, and anterior horns of the meniscus were excised.  The ICRTec navigation device was affixed and navigated. The distal cut was made. The femur was then sized with a sizing guide. The femoral cutting block was placed and all femoral cuts were performed. The proximal tibia was exposed. Wyckoff navigation protocol was accomplished.  11 millimeters were removed.  We used the extramedullary tibial cutting guide set for removal of 3mm of bone off the low side. The tibial cut was performed. The posterior horns of the menisci were excised. The posterior osteophytes were removed. Flexion extension blocks were then used to balance the knee. The tibial cut surface was then sized with the sizing templates and the tibial and femoral trial were then placed. The  tray was aligned with the middle third of the tubercle.    Attention was then placed to the patella. The patella was balanced to track centrally through range of motion.  It measured 24 and patella resurfacing was performed.   At this point all trial components were removed, the knee was copiously irrigated with pulsed lavage, and the knee was injected with anesthetic cocktail solution. The cut surfaces were then dried with clean lap sponges, and the components were cemented tibia, followed by femura. The knee was held in full extension and all excess cement was removed. The knee was held still until the cement had completely hardened. We then placed the trial polyethylene spacer which resulted in full extension and excellent flexion-extension balance. We placed the final polyethylene spacer.   The knee was then copiously irrigated with the full 3 liters. The tourniquet was then released. There was excellent hemostasis. We closed the knee in multiple layers in standard fashion. Sterile dressing were applied. At the end of the case, the sponge and needle counts were reported as being correct. There were no known complications. The patient was then transported to the recovery room.    Surgical assistant performed retraction, suction, hemostasis, and specific limb positioning and manipulation required for accuracy of joint placement, and assistance in wound closure and dressing application.       Anthony Lujan M.D.

## 2018-03-12 NOTE — ANESTHESIA PREPROCEDURE EVALUATION
Anesthesia Evaluation     Patient summary reviewed and Nursing notes reviewed   NPO Solid Status: > 8 hours  NPO Liquid Status: > 2 hours           Airway   Mallampati: I  Dental      Pulmonary - negative pulmonary ROS and normal exam    breath sounds clear to auscultation  Cardiovascular - normal exam    ECG reviewed    (+) hypertension poorly controlled,       Neuro/Psych- negative ROS  GI/Hepatic/Renal/Endo    (+) obesity,       Musculoskeletal     Abdominal   (+) obese,    Substance History - negative use     OB/GYN          Other   (+) arthritis                     Anesthesia Plan    ASA 2     general     intravenous induction   Anesthetic plan and risks discussed with patient.

## 2018-03-12 NOTE — ANESTHESIA POSTPROCEDURE EVALUATION
"Patient: Urbano Mares    Procedure Summary     Date:  03/12/18 Room / Location:  [facility] OR [address] [place] [address] MAIN OR    Anesthesia Start:  1255 Anesthesia Stop:  1513    Procedure:  RIGHT TOTAL KNEE ARTHROPLASTY WITH CECILY NAVIGATION (Right Knee) Diagnosis:       Arthritis of right knee      (Arthritis of right knee [M17.11])    Surgeon:  Anthony Lujan MD Provider:  Constantino Ritter MD    Anesthesia Type:  general ASA Status:  2          Anesthesia Type: general  Last vitals  BP   160/98 (03/12/18 1605)   Temp   37.6 °C (99.6 °F) (03/12/18 1508)   Pulse   96 (03/12/18 1605)   Resp   18 (03/12/18 1605)     SpO2   95 % (03/12/18 1605)     Post Anesthesia Care and Evaluation    Patient location during evaluation: bedside  Patient participation: complete - patient participated  Level of consciousness: sleepy but conscious  Pain score: 0  Pain management: adequate  Airway patency: patent  Anesthetic complications: No anesthetic complications    Cardiovascular status: acceptable  Respiratory status: acceptable  Hydration status: acceptable    Comments: /98   Pulse 96   Temp 37.6 °C (99.6 °F) (Oral)   Resp 18   Ht 182.9 cm (72\")   Wt 114 kg (251 lb 1.7 oz)   SpO2 95%   BMI 34.06 kg/m²         "

## 2018-03-12 NOTE — H&P (VIEW-ONLY)
History & Physical       Patient: Urbano Mares  YOB: 1970  Medical Record Number: [id number]  Body mass index is 34.04 kg/(m^2).    Surgeon:  Dr. Anthony Lujan    Chief Complaints:   Chief Complaint   Patient presents with   • Right Knee - Pre-op Exam, Pain       Subjective:    History of Present Illness: 48 y.o. male presents with   Chief Complaint   Patient presents with   • Right Knee - Pre-op Exam, Pain   . Onset of symptoms was years ago and has been progressively worsening despite more conservative treatment measures.  Symptoms are associated with ability to move, exercise, and perform activities of daily living.  Symptoms are aggravated by weight bearing and ROM necessary for activities of daily living.   Symptoms improve with rest, ice and elevation only minimally.      Allergies: No Known Allergies    Medications:   Home Medications:  Current Outpatient Prescriptions on File Prior to Visit   Medication Sig   • Chlorhexidine Gluconate Cloth 2 % pads Apply  topically. PRIOR  TO OR   • HYDROcodone-acetaminophen (NORCO) 7.5-325 MG per tablet Take 1 tablet by mouth Every 6 (Six) Hours As Needed for Moderate Pain .   • lisinopril (PRINIVIL,ZESTRIL) 20 MG tablet Take 1 tablet by mouth Daily.     No current facility-administered medications on file prior to visit.      Current Medications:  Scheduled Meds:  Continuous Infusions:  No current facility-administered medications for this visit.   PRN Meds:.    I have reviewed the patient's medical history in detail and updated the computerized patient record.  Review and summarization of old records include:    Past Medical History:   Diagnosis Date   • Arthritis     OSTEO   • Hypertension    • Joint pain         Past Surgical History:   Procedure Laterality Date   • TOTAL KNEE ARTHROPLASTY Left 11/14/2017    Procedure: LEFT TOTAL KNEE ARTHROPLASTY WITH CECILY NAVIGATION;  Surgeon: Anthony Lujan MD;  Location: [facility] OR;  Service:    •  WISDOM TOOTH EXTRACTION          Social History     Occupational History   • Not on file.     Social History Main Topics   • Smoking status: Light Tobacco Smoker     Types: Cigars   • Smokeless tobacco: Never Used      Comment: FEW YEARLY    • Alcohol use Yes      Comment: COUPLE TIMES/ WEEK   • Drug use: No   • Sexual activity: Defer    Social History     Social History Narrative        Family History   Problem Relation Age of Onset   • Cancer Mother    • Cancer Father      Prostate, Bladder, Brain   • Hypertension Sister    • Hypertension Brother    • Malig Hyperthermia Neg Hx        ROS: 14 point review of systems was performed and was negative except for documented findings in HPI and today's encounter.     Allergies: No Known Allergies  Constitutional:  Denies fever, shaking or chills   Eyes:  Denies change in visual acuity   HENT:  Denies nasal congestion or sore throat   Respiratory:  Denies cough or shortness of breath   Cardiovascular:  Denies chest pain or severe LE edema   GI:  Denies abdominal pain, nausea, vomiting, bloody stools or diarrhea   Musculoskeletal:  Denies numbness tingling or loss of motor function except as outlined above in history of present illness.  : Denies painful urination or hematuria  Integument:  Denies rash, lesion or ulceration   Neurologic:  Denies headache or focal weakness  Endocrine:  Denies lymphadenopathy  Psych:  Denies confusion or change in mental status   Hem:  Denies active bleeding    Physical Exam: 48 y.o. male  Body mass index is 34.04 kg/(m^2)., @HT@, @WT@  Vitals:    03/06/18 1427   Temp: 99.1 °F (37.3 °C)     Vital signs reviewed.   General Appearance:    Alert, cooperative, in no acute distress                  Eyes: conjunctiva clear  ENT: external ears and nose atraumatic  CV: no peripheral edema  Resp: normal respiratory effort  Skin: no rashes or wounds; normal turgor  Psych: mood and affect appropriate  Lymph: no nodes appreciated  Neuro: gross  sensation intact  Vascular:  Palpable peripheral pulse in noted extremity  Musculoskeletal Extremities: DETAILED KNEE Exam: Right knee: Painful gait w/wo limp, muscle atrophy, erythema, ecchymosis, or gross deformity noted, Large knee effusion, + medial joint line tenderness, Active range of motion normal, 5/5 strength flexion and extension, The knee is stable to varus and valgus stress testing, VARUS VALGUS NEUTRAL: varus alignment of the limb, Lachman negative, Posterior drawer negative, Evy's negative, Patellofemoral grind +, Sensation grossly intact to light tough throughout the lower extremity, Skin is intact, Distal pulses are palpable, No signs or symptoms of DVT          Diagnostic Tests:  Appointment on 03/01/2018   Component Date Value Ref Range Status   • Glucose 03/01/2018 140* 65 - 99 mg/dL Final   • BUN 03/01/2018 18  6 - 20 mg/dL Final   • Creatinine 03/01/2018 0.93  0.76 - 1.27 mg/dL Final   • Sodium 03/01/2018 141  136 - 145 mmol/L Final   • Potassium 03/01/2018 4.5  3.5 - 5.2 mmol/L Final   • Chloride 03/01/2018 101  98 - 107 mmol/L Final   • CO2 03/01/2018 28.0  22.0 - 29.0 mmol/L Final   • Calcium 03/01/2018 10.0  8.6 - 10.5 mg/dL Final   • eGFR Non African Amer 03/01/2018 86  >60 mL/min/1.73 Final   • BUN/Creatinine Ratio 03/01/2018 19.4  7.0 - 25.0 Final   • Anion Gap 03/01/2018 12.0  mmol/L Final   • WBC 03/01/2018 7.96  4.50 - 10.70 10*3/mm3 Final   • RBC 03/01/2018 5.20  4.60 - 6.00 10*6/mm3 Final   • Hemoglobin 03/01/2018 15.2  13.7 - 17.6 g/dL Final   • Hematocrit 03/01/2018 48.6  40.4 - 52.2 % Final   • MCV 03/01/2018 93.5  79.8 - 96.2 fL Final   • MCH 03/01/2018 29.2  27.0 - 32.7 pg Final   • MCHC 03/01/2018 31.3* 32.6 - 36.4 g/dL Final   • RDW 03/01/2018 14.4  11.5 - 14.5 % Final   • RDW-SD 03/01/2018 48.6  37.0 - 54.0 fl Final   • MPV 03/01/2018 11.0  6.0 - 12.0 fL Final   • Platelets 03/01/2018 226  140 - 500 10*3/mm3 Final   • Color, UA 03/01/2018 Yellow  Yellow, Straw Final   •  Appearance, UA 03/01/2018 Clear  Clear Final   • pH, UA 03/01/2018 <=5.0  5.0 - 8.0 Final   • Specific Gravity, UA 03/01/2018 1.011  1.005 - 1.030 Final   • Glucose, UA 03/01/2018 Negative  Negative Final   • Ketones, UA 03/01/2018 Negative  Negative Final   • Bilirubin, UA 03/01/2018 Negative  Negative Final   • Blood, UA 03/01/2018 Negative  Negative Final   • Protein, UA 03/01/2018 Negative  Negative Final   • Leuk Esterase, UA 03/01/2018 Negative  Negative Final   • Nitrite, UA 03/01/2018 Negative  Negative Final   • Urobilinogen, UA 03/01/2018 0.2 E.U./dL  0.2 - 1.0 E.U./dL Final     No results found.    Imaging was done previously in the office, images were personally viewed and discussed at length with the patient:    Indication: pain related symptoms,  Views: 3V AP, LAT & 40 degree PA bilateral knee(s)   Findings: severe end-stage arthritis (bone on bone, subchondral sclerosis/cysts, osteophytes)  Comparison views: viewed last xray done in the office.     Assessment:  Patient Active Problem List   Diagnosis   • Chronic pain of both knees   • Arthritis of left knee   • Arthritis of right knee   • Status post total left knee replacement       Plan:  Dr. Anthony Lujan reviewed anatomy of a total joint arthroplasty in laymen's terms, as well as typical postoperative recovery and possibly 6-12 months for maximal recovery, and possible need for rehabilitation stay after hospitalization. We also discussed risks, benefits, alternatives, and limitations of procedure with risks including but not limited to neurovascular damage, bleeding, infection, malalignment, chronic pian, failure of implants, osteolysis, loosening of implants, loss of motion, weakness, stiffness, instability, DVT, pulmonary embolus, death, stroke, complex regional pain syndrome, myocardial infarction, and need for additional procedures. Concept of substitution vs. replacement discussed.  No guarantees were given regarding results of surgery.       Urbano IVANA Mares was given the opportunity to ask and have all questions answered today.  The patient voiced understanding of the risks, benefits, and alternative forms of treatment that were discussed and the patient consents to proceed with surgery.     Patient's blood clot history is negative.  Planned DVT prophylaxis for surgery:  Aspirin    Discharge Plan: POD 2-3 to home and [facility] wants to do outpatient PT straight from the hospital.     Patient was seen by JOHN Armstrong in the office today.    Date: 3/6/2018  JOHN Hinkle

## 2018-03-13 VITALS
SYSTOLIC BLOOD PRESSURE: 121 MMHG | BODY MASS INDEX: 34.01 KG/M2 | HEIGHT: 72 IN | OXYGEN SATURATION: 96 % | HEART RATE: 78 BPM | DIASTOLIC BLOOD PRESSURE: 56 MMHG | RESPIRATION RATE: 16 BRPM | TEMPERATURE: 97.9 F | WEIGHT: 251.1 LBS

## 2018-03-13 LAB
ANION GAP SERPL CALCULATED.3IONS-SCNC: 13.9 MMOL/L
BUN BLD-MCNC: 15 MG/DL (ref 6–20)
BUN/CREAT SERPL: 17.2 (ref 7–25)
CALCIUM SPEC-SCNC: 8.6 MG/DL (ref 8.6–10.5)
CHLORIDE SERPL-SCNC: 98 MMOL/L (ref 98–107)
CO2 SERPL-SCNC: 22.1 MMOL/L (ref 22–29)
CREAT BLD-MCNC: 0.87 MG/DL (ref 0.76–1.27)
GFR SERPL CREATININE-BSD FRML MDRD: 93 ML/MIN/1.73
GLUCOSE BLD-MCNC: 169 MG/DL (ref 65–99)
HCT VFR BLD AUTO: 40.4 % (ref 40.4–52.2)
HGB BLD-MCNC: 12.7 G/DL (ref 13.7–17.6)
POTASSIUM BLD-SCNC: 4.3 MMOL/L (ref 3.5–5.2)
SODIUM BLD-SCNC: 134 MMOL/L (ref 136–145)

## 2018-03-13 PROCEDURE — 85018 HEMOGLOBIN: CPT | Performed by: ORTHOPAEDIC SURGERY

## 2018-03-13 PROCEDURE — G8979 MOBILITY GOAL STATUS: HCPCS | Performed by: PHYSICAL THERAPIST

## 2018-03-13 PROCEDURE — 85014 HEMATOCRIT: CPT | Performed by: ORTHOPAEDIC SURGERY

## 2018-03-13 PROCEDURE — G8978 MOBILITY CURRENT STATUS: HCPCS | Performed by: PHYSICAL THERAPIST

## 2018-03-13 PROCEDURE — 97110 THERAPEUTIC EXERCISES: CPT | Performed by: PHYSICAL THERAPIST

## 2018-03-13 PROCEDURE — 25010000002 KETOROLAC TROMETHAMINE PER 15 MG: Performed by: ORTHOPAEDIC SURGERY

## 2018-03-13 PROCEDURE — 97161 PT EVAL LOW COMPLEX 20 MIN: CPT | Performed by: PHYSICAL THERAPIST

## 2018-03-13 PROCEDURE — 80048 BASIC METABOLIC PNL TOTAL CA: CPT | Performed by: ORTHOPAEDIC SURGERY

## 2018-03-13 PROCEDURE — G8980 MOBILITY D/C STATUS: HCPCS | Performed by: PHYSICAL THERAPIST

## 2018-03-13 PROCEDURE — 25010000003 CEFAZOLIN IN DEXTROSE 2-4 GM/100ML-% SOLUTION: Performed by: ORTHOPAEDIC SURGERY

## 2018-03-13 PROCEDURE — 99024 POSTOP FOLLOW-UP VISIT: CPT | Performed by: ORTHOPAEDIC SURGERY

## 2018-03-13 RX ORDER — KETOROLAC TROMETHAMINE 30 MG/ML
30 INJECTION, SOLUTION INTRAMUSCULAR; INTRAVENOUS ONCE
Status: COMPLETED | OUTPATIENT
Start: 2018-03-13 | End: 2018-03-13

## 2018-03-13 RX ORDER — PSEUDOEPHEDRINE HCL 30 MG
100 TABLET ORAL 2 TIMES DAILY
Qty: 60 CAPSULE | Refills: 2 | Status: SHIPPED | OUTPATIENT
Start: 2018-03-13 | End: 2018-04-26

## 2018-03-13 RX ORDER — BISACODYL 5 MG/1
10 TABLET, DELAYED RELEASE ORAL DAILY PRN
Qty: 30 TABLET | Refills: 3 | Status: SHIPPED | OUTPATIENT
Start: 2018-03-13 | End: 2018-04-26

## 2018-03-13 RX ORDER — OXYCODONE AND ACETAMINOPHEN 7.5; 325 MG/1; MG/1
TABLET ORAL
Qty: 100 TABLET | Refills: 0 | Status: SHIPPED | OUTPATIENT
Start: 2018-03-13 | End: 2018-03-29 | Stop reason: SDUPTHER

## 2018-03-13 RX ORDER — ONDANSETRON 4 MG/1
4 TABLET, ORALLY DISINTEGRATING ORAL EVERY 6 HOURS PRN
Qty: 20 TABLET | Refills: 3 | Status: SHIPPED | OUTPATIENT
Start: 2018-03-13 | End: 2018-04-26

## 2018-03-13 RX ADMIN — POLYETHYLENE GLYCOL 3350 17 G: 17 POWDER, FOR SOLUTION ORAL at 07:46

## 2018-03-13 RX ADMIN — OXYCODONE HYDROCHLORIDE AND ACETAMINOPHEN 2 TABLET: 7.5; 325 TABLET ORAL at 03:37

## 2018-03-13 RX ADMIN — MUPIROCIN: 20 OINTMENT TOPICAL at 07:46

## 2018-03-13 RX ADMIN — CEFAZOLIN SODIUM 2 G: 2 INJECTION, SOLUTION INTRAVENOUS at 07:46

## 2018-03-13 RX ADMIN — ASPIRIN 325 MG: 325 TABLET, DELAYED RELEASE ORAL at 07:46

## 2018-03-13 RX ADMIN — OXYCODONE HYDROCHLORIDE AND ACETAMINOPHEN 2 TABLET: 7.5; 325 TABLET ORAL at 12:28

## 2018-03-13 RX ADMIN — OXYCODONE HYDROCHLORIDE AND ACETAMINOPHEN 2 TABLET: 7.5; 325 TABLET ORAL at 07:46

## 2018-03-13 RX ADMIN — DOCUSATE SODIUM 100 MG: 100 CAPSULE, LIQUID FILLED ORAL at 07:46

## 2018-03-13 RX ADMIN — KETOROLAC TROMETHAMINE 30 MG: 30 INJECTION, SOLUTION INTRAMUSCULAR at 10:08

## 2018-03-13 NOTE — PROGRESS NOTES
Discharge Planning Assessment  [facility]     Patient Name: Urbano Mares  MRN: [id number]  Today's Date: 3/13/2018    Admit Date: 3/12/2018          Discharge Needs Assessment     Row Name 03/13/18 0827       Living Environment    Lives With spouse    Current Living Arrangements home/apartment/condo       Resource/Environmental Concerns    Resource/Environmental Concerns none    Transportation Concerns car, none       Transition Planning    Patient/Family Anticipates Transition to home with family       Discharge Needs Assessment    Discharge Facility/Level of Care Needs home with home health            Discharge Plan     Row Name 03/13/18 0828       Plan    Plan Home w/ spouse and [facility]     Patient/Family in Agreement with Plan yes    Plan Comments Facesheet and d/c plan verified, pt would like [facility] per pre-op assessment. Nelia/[facility] notified and will accept.     Final Discharge Disposition Code 06 - home with home health care    Final Note d/c home w/ spouse and [facility], Nelia/HE notified.         Destination     No service coordination in this encounter.      Durable Medical Equipment     No service coordination in this encounter.      Dialysis/Infusion     No service coordination in this encounter.      Home Medical Care - Selection Complete     Service Request Status Selected Specialties Address Phone Number Fax Number    [facility] Selected Home Health Services [address] [address] [phone]      Social Care     No service coordination in this encounter.        Expected Discharge Date and Time     Expected Discharge Date Expected Discharge Time    Mar 13, 2018               Demographic Summary    No documentation.           Functional Status    No documentation.           Psychosocial    No documentation.           Abuse/Neglect    No documentation.           Legal    No documentation.           Substance Abuse    No documentation.            Patient Forms    No documentation.         Rosa Isela Gallardo RN

## 2018-03-13 NOTE — PLAN OF CARE
Problem: Patient Care Overview  Goal: Plan of Care Review   03/13/18 1108   OTHER   Outcome Summary Pt is s/p R TKA and plans to d/c home later today. Pt ambulated independently with RWx and was able to navigate stairs. Pt was educated on HEP and use of equipment and is safe to d/c home when medically stable. PT will sign off

## 2018-03-13 NOTE — PROGRESS NOTES
Orthopedic Total Joint Progress Note        Patient: Urbano Mares    Date of Admission: 3/12/2018 10:34 AM    YOB: 1970    Medical Record Number: [id number]    Attending Physician: Anthony Lujan MD      POD # 1 Day Post-Op Procedure(s) (LRB):  RIGHT TOTAL KNEE ARTHROPLASTY WITH CECILY NAVIGATION (Right)       Systemic or Specific Complaints: No Complaints      Allergies: No Known Allergies    Medications:   Current Medications:  Scheduled Meds:  aspirin 325 mg Oral Q12H   ceFAZolin 2 g Intravenous Q8H   docusate sodium 100 mg Oral BID   lisinopril 20 mg Oral Daily   mupirocin  Each Nare BID   polyethylene glycol 17 g Oral Daily     Continuous Infusions:  lactated ringers 9 mL/hr Last Rate: Stopped (03/12/18 1933)   lactated ringers 100 mL/hr Last Rate: 100 mL/hr (03/13/18 0624)     PRN Meds:.•  acetaminophen  •  bisacodyl  •  bisacodyl  •  diphenhydrAMINE **OR** diphenhydrAMINE  •  HYDROmorphone **AND** naloxone  •  magnesium hydroxide  •  ondansetron **OR** ondansetron ODT **OR** ondansetron  •  oxyCODONE-acetaminophen  •  oxyCODONE-acetaminophen  •  promethazine      Physical Exam: 48 y.o. male Body mass index is 34.06 kg/m².  General Appearance:    alert and oriented            Pain Relief: Patient reports good relief Vitals:    03/12/18 1919 03/12/18 2300 03/13/18 0300 03/13/18 0700   BP: 139/74 118/66 108/60 121/56   BP Location: Right arm Right arm Left arm Left arm   Patient Position: Lying Sitting Sitting Lying   Pulse: 85 92 87 78   Resp: 16 16 16 16   Temp:  97.4 °F (36.3 °C) 97.5 °F (36.4 °C) 97.9 °F (36.6 °C)   TempSrc:    Oral   SpO2: 96% 96% 97% 96%   Weight:       Height:              HEENT:    Normocephalic, without obvious abnormality, atraumatic.          PERRLA; EOMI; Neck supple with trachea midline. No JVD.       Lungs:     Respirations regular, even and unlabored      Heart:    Regular rhythm and normal rate.   Abdomen:     Normal bowel sounds, soft non-tender,  non-distended       Extremities:   Operative extremity neurovascular status intact. ROM intact.    Incision intact w/out signs or symptoms of infection.  No cyanosis, no calf tenderness     Pulses:     Pulses palpable and equal bilaterally     Skin:     Skin Warm/Dry w/out cyanosis     Activity: Mobilizing Per P.T.   Weight Bearing: As Tolerated    Diagnostic Tests:   Admission on 03/12/2018   Component Date Value Ref Range Status   • Glucose 03/12/2018 102  70 - 130 mg/dL Final   • Glucose 03/13/2018 169* 65 - 99 mg/dL Final   • BUN 03/13/2018 15  6 - 20 mg/dL Final   • Creatinine 03/13/2018 0.87  0.76 - 1.27 mg/dL Final   • Sodium 03/13/2018 134* 136 - 145 mmol/L Final   • Potassium 03/13/2018 4.3  3.5 - 5.2 mmol/L Final   • Chloride 03/13/2018 98  98 - 107 mmol/L Final   • CO2 03/13/2018 22.1  22.0 - 29.0 mmol/L Final   • Calcium 03/13/2018 8.6  8.6 - 10.5 mg/dL Final   • eGFR Non African Amer 03/13/2018 93  >60 mL/min/1.73 Final   • BUN/Creatinine Ratio 03/13/2018 17.2  7.0 - 25.0 Final   • Anion Gap 03/13/2018 13.9  mmol/L Final   • Hemoglobin 03/13/2018 12.7* 13.7 - 17.6 g/dL Final   • Hematocrit 03/13/2018 40.4  40.4 - 52.2 % Final       No results found.    Personally viewed ortho images and report     Assessment:  Doing well POD  # 1 Day Post-Op following total joint replacement  Acute Blood Loss Anemia, stable  Post-operative Pain  Limited mobility, requires use of walker and assistance when OOB.    Patient Active Problem List   Diagnosis   • Chronic pain of both knees   • Arthritis of left knee   • Arthritis of right knee   • Status post total left knee replacement        Plan:  Continue to monitor labs and/or v/s, for tolerance to post op blood loss.  Continue efforts to increase mobilization.  Continue Pain Control Measures.  Continue incisional Care.  DVT prophylaxis.  Follow up in office with Anthony Lujan M.D. In 2 weeks.    Discharge Plan:today to home, home health and when cleared by physical  therapy as safe for discharge    Date: 3/13/2018  Anthony Lujan MD

## 2018-03-13 NOTE — PLAN OF CARE
Problem: Patient Care Overview  Goal: Plan of Care Review  Outcome: Ongoing (interventions implemented as appropriate)   03/12/18 2546   Coping/Psychosocial   Plan of Care Reviewed With patient   Plan of Care Review   Progress improving   OTHER   Outcome Summary vss, dsg c/d/i, neurovascular status wnl, voiding well, reports adequate pain control, ambulating in [place], discussed importance of monitoring b/p d/t hx hypertension, possible d/c 03/13/2018     Goal: Individualization and Mutuality  Outcome: Ongoing (interventions implemented as appropriate)    Goal: Discharge Needs Assessment  Outcome: Ongoing (interventions implemented as appropriate)    Goal: Interprofessional Rounds/Family Conf  Outcome: Ongoing (interventions implemented as appropriate)      Problem: Fall Risk (Adult)  Goal: Identify Related Risk Factors and Signs and Symptoms  Outcome: Outcome(s) achieved Date Met: 03/12/18    Goal: Absence of Fall  Outcome: Ongoing (interventions implemented as appropriate)      Problem: Knee Arthroplasty (Total, Partial) (Adult)  Goal: Signs and Symptoms of Listed Potential Problems Will be Absent, Minimized or Managed (Knee Arthroplasty)  Outcome: Ongoing (interventions implemented as appropriate)    Goal: Anesthesia/Sedation Recovery  Outcome: Ongoing (interventions implemented as appropriate)

## 2018-03-13 NOTE — DISCHARGE SUMMARY
Orthopedic Discharge Summary      Patient: Urbano Mares  YOB: 1970  Medical Record Number: [id number]    Attending Physician: Anthony Lujan MD    Date of Admission: 3/12/2018 10:34 AM  Date of Discharge: 3/13/18    Discharge Diagnosis: RIGHT TOTAL KNEE ARTHROPLASTY WITH CECILY NAVIGATION,   Acute Blood Loss Anemia, stable  Post-operative Pain  Limited mobility, requires use of walker and assistance when OOB.    Presenting Problem/History of Present Illness: Arthritis of right knee [M17.11]  Status post total left knee replacement [Z96.652]  Arthritis of right knee [M17.11]  Arthritis of right knee [M17.11]        Allergies: No Known Allergies    Discharge Medications     Urbano Mares   Home Medication Instructions GEM:[id number]    Printed on:03/13/18 0822   Medication Information                      aspirin  MG EC tablet  Take 1 tablet by mouth Every 12 (Twelve) Hours for 82 doses.             bisacodyl (DULCOLAX) 5 MG EC tablet  Take 2 tablets by mouth Daily As Needed for Constipation.             docusate sodium 100 MG capsule  Take 100 mg by mouth 2 (Two) Times a Day.             lisinopril (PRINIVIL,ZESTRIL) 20 MG tablet  Take 1 tablet by mouth Daily.             ondansetron ODT (ZOFRAN-ODT) 4 MG disintegrating tablet  Take 1 tablet by mouth Every 6 (Six) Hours As Needed for Nausea or Vomiting.             oxyCODONE-acetaminophen (PERCOCET) 7.5-325 MG per tablet  1-2 tabs po q 3-4 hr prn severe pain, wean as tolerated             polyethylene glycol (MIRALAX) pack packet  Take 17 g by mouth Daily.                   Past Medical History:   Diagnosis Date   • Arthritis     OSTEO   • Hypertension    • Joint pain         Past Surgical History:   Procedure Laterality Date   • TOTAL KNEE ARTHROPLASTY Left 11/14/2017    Procedure: LEFT TOTAL KNEE ARTHROPLASTY WITH CECILY NAVIGATION;  Surgeon: Anthony Lujan MD;  [facility];  Service:    • WISDOM TOOTH EXTRACTION           Social History     Occupational History   • Not on file.     Social History Main Topics   • Smoking status: Light Tobacco Smoker     Types: Cigars   • Smokeless tobacco: Never Used      Comment: FEW YEARLY    • Alcohol use Yes      Comment: COUPLE TIMES/ WEEK   • Drug use: No   • Sexual activity: Defer    Social History     Social History Narrative   • No narrative on file        Family History   Problem Relation Age of Onset   • Cancer Mother    • Cancer Father      Prostate, Bladder, Brain   • Hypertension Sister    • Hypertension Brother    • Malig Hyperthermia Neg Hx          Physical Exam: 48 y.o. male Body mass index is 34.06 kg/m².    General Appearance:    Alert, cooperative, in no acute distress                    Vitals:    03/12/18 1919 03/12/18 2300 03/13/18 0300 03/13/18 0700   BP: 139/74 118/66 108/60 121/56   BP Location: Right arm Right arm Left arm Left arm   Patient Position: Lying Sitting Sitting Lying   Pulse: 85 92 87 78   Resp: 16 16 16 16   Temp:  97.4 °F (36.3 °C) 97.5 °F (36.4 °C) 97.9 °F (36.6 °C)   TempSrc:    Oral   SpO2: 96% 96% 97% 96%   Weight:       Height:            DIAGNOSTIC TESTS:   Admission on 03/12/2018   Component Date Value Ref Range Status   • Glucose 03/12/2018 102  70 - 130 mg/dL Final   • Glucose 03/13/2018 169* 65 - 99 mg/dL Final   • BUN 03/13/2018 15  6 - 20 mg/dL Final   • Creatinine 03/13/2018 0.87  0.76 - 1.27 mg/dL Final   • Sodium 03/13/2018 134* 136 - 145 mmol/L Final   • Potassium 03/13/2018 4.3  3.5 - 5.2 mmol/L Final   • Chloride 03/13/2018 98  98 - 107 mmol/L Final   • CO2 03/13/2018 22.1  22.0 - 29.0 mmol/L Final   • Calcium 03/13/2018 8.6  8.6 - 10.5 mg/dL Final   • eGFR Non African Amer 03/13/2018 93  >60 mL/min/1.73 Final   • BUN/Creatinine Ratio 03/13/2018 17.2  7.0 - 25.0 Final   • Anion Gap 03/13/2018 13.9  mmol/L Final   • Hemoglobin 03/13/2018 12.7* 13.7 - 17.6 g/dL Final   • Hematocrit 03/13/2018 40.4  40.4 - 52.2 % Final       No results  found.    Hospital Course:  48 y.o. male admitted to [facility] to services of Anthony Lujan MD with Arthritis of right knee [M17.11]  Status post total left knee replacement [Z96.652]  Arthritis of right knee [M17.11]  Arthritis of right knee [M17.11] on 3/12/2018 and underwent RIGHT TOTAL KNEE ARTHROPLASTY WITH CECILY NAVIGATION  Per Anthony Lujan MD. Antibiotic and VTE prophylaxis were per [facility]IP protocols. Post-operatively the patient transferred to the post-operative floor where the patient underwent mobilization therapy that included active as well as passive ROM exercises. Opioids were titrated to achieve appropriate pain management to allow for participation in mobilization exercises. Vital signs are now stable. On the day of discharge the wound was clean, dry and intact and calf was soft and non tender and Homans sign was negative. Operative extremity neurovascular status remains intact.   Appropriate education re: incision care, activity levels, medications, and follow up visits was completed and all questions were answered. The patient is now deemed stable for discharge.    Condition on Discharge:  Stable    Discharge Instructions: Patient is to continue with physical therapy exercises twice daily and continue working with the physical therapist as ordered. Patient may weight bear as tolerated unless otherwise specified. Continue EULA hose daily (for six weeks) and ice regularly. Patient instructed on frequent calf pumping exercises.  Patient also instructed on incentive spirometer during hospitalization and encouraged to continue to use at home regularly.  See wound care discharge instructions.    Follow up Instructions:  Follow up in the office with Dr. Anthony Lujan in 2 weeks - patient to call the office at [phone] to schedule. Prescriptions were given for pain medication and blood thinner therapy.    Follow-up Appointments  Future Appointments  Date Time Provider Department Center    3/29/2018 9:40 AM Anthony David MD [facility] None     Additional Instructions for the Follow-ups that You Need to Schedule     Ambulatory Referral to Home Health    As directed      Face to Face Visit Date:  3/12/2018    Follow-up Provider for Plan of Care?:  I will be treating the patient on an ongoing basis.  Please send me the Plan of Care for signature.    Follow-up Provider:  ANTHONY DAVID [[id number]]    Reason/Clinical Findings:  post operativ pain with ambullation, requires use of walker for ambulation    Describe mobility limitations that make leaving home difficult:  requires assist of another to leave home    Nursing/Therapeutic Services Requested:  Skilled Nursing    Skilled nursing orders:  Wound care dressing/changes    Frequency:  1 Week 1         Discharge Follow-up with Specified Provider: Anthony David M.D. at [facility] [phone]; 2 Weeks    As directed      To:  Anthony David M.D. at [facility] [phone]    Follow Up:  2 Weeks         Dressing Change Instructions    As directed      IV. INCISION CARE: May shower and let water run over the incision on post-operative day #5. Do not scrub or rub the incision. Simply let the water run over the incision and pat dry. Keep covered with clean dry dressing as long as there is drainage.    Wash your hands prior to dressing changes  Change the dressing daily as needed to keep incision clean and dry. Utilize dry gauze and paper tape. Avoid touching the side of the gauze that goes against the incision with your hands.  No creams or ointments to the incision  May remove dressing once the incision is free of drainage usually around 5 days post op.  Do not touch or pick at the incision  Check incision every day and notify surgeon immediately if any of the following signs or symptoms are noted:  Increase in baseline redness (bruising is normal)  Increase in baseline swelling around the  incision and of the entire extremity  Sudden increase in pain or inability to bend the knee  Drainage oozing from the incision more than 5 days out from surgery.  Pulling apart of the edges of the incision  Increase in overall body temperature (greater than 100.5 degrees) Make sure you are doing your incentive spirometer and deep breathing exercises every day while awake as this is the most frequent cause of low grade fever post operatively.  Your staple removal will be done in the office at your follow-up visit.    Order Comments:  IV. INCISION CARE: May shower and let water run over the incision on post-operative day #5. Do not scrub or rub the incision. Simply let the water run over the incision and pat dry. Keep covered with clean dry dressing as long as there is drainage. Wash your hands prior to dressing changes Change the dressing daily as needed to keep incision clean and dry. Utilize dry gauze and paper tape. Avoid touching the side of the gauze that goes against the incision with your hands. No creams or ointments to the incision May remove dressing once the incision is free of drainage usually around 5 days post op. Do not touch or pick at the incision Check incision every day and notify surgeon immediately if any of the following signs or symptoms are noted: Increase in baseline redness (bruising is normal) Increase in baseline swelling around the incision and of the entire extremity Sudden increase in pain or inability to bend the knee Drainage oozing from the incision more than 5 days out from surgery. Pulling apart of the edges of the incision Increase in overall body temperature (greater than 100.5 degrees) Make sure you are doing your incentive spirometer and deep breathing exercises every day while awake as this is the most frequent cause of low grade fever post operatively. Your staple removal will be done in the office at your follow-up visit.                Discharge Disposition Plan:today to  home, home health and when cleared by physical therapy as safe for discharge    Date: 3/13/2018    Anthony Lujan MD

## 2018-03-13 NOTE — THERAPY DISCHARGE NOTE
Acute Care - Physical Therapy Initial Eval/Discharge  [facility]     Patient Name: Urbano Mares  : 1970  MRN: [id number]  Today's Date: 3/13/2018                Admit Date: 3/12/2018    Visit Dx:    ICD-10-CM ICD-9-CM   1. Arthritis of right knee M17.11 716.96     Patient Active Problem List   Diagnosis   • Chronic pain of both knees   • Arthritis of left knee   • Arthritis of right knee   • Status post total left knee replacement     Past Medical History:   Diagnosis Date   • Arthritis     OSTEO   • Hypertension    • Joint pain      Past Surgical History:   Procedure Laterality Date   • TOTAL KNEE ARTHROPLASTY Left 2017    Procedure: LEFT TOTAL KNEE ARTHROPLASTY WITH CECILY NAVIGATION;  Surgeon: Anthony Lujan MD;  Location: [facility];  Service:    • TOTAL KNEE ARTHROPLASTY Right 3/12/2018    Procedure: RIGHT TOTAL KNEE ARTHROPLASTY WITH CECILY NAVIGATION;  Surgeon: Anthony Lujan MD;  Location: [facility];  Service: Orthopedics   • WISDOM TOOTH EXTRACTION            PT ASSESSMENT (last 72 hours)      Physical Therapy Evaluation     Row Name 18 09          PT Evaluation Time/Intention    Subjective Information no complaints  -     Document Type evaluation;discharge evaluation/summary  -     Patient Effort good  -     Symptoms Noted During/After Treatment increased pain  -     Row Name 18 09          General Information    Patient Observations alert  -     General Observations of Patient in chair, spouise in room  -     Prior Level of Function independent:  -KH     Equipment Currently Used at Home none  -KH     Existing Precautions/Restrictions fall  -     Row Name 18 09          Relationship/Environment    Lives With spouse  -     Row Name 18 09          Resource/Environmental Concerns    Current Living Arrangements home/apartment/condo  -     Row Name 18 09          Home Main Entrance    Number of Stairs, Main Entrance ten   -KH     Stair Railings, Main Entrance railings safe and in good condition  -     Row Name 03/13/18 0912          Cognitive Assessment/Intervention- PT/OT    Orientation Status (Cognition) oriented x 4  -KH     Follows Commands (Cognition) WNL  -[facility] Name 03/13/18 0912          Mobility Assessment/Treatment    Extremity Weight-bearing Status right lower extremity  -     Right Lower Extremity (Weight-bearing Status) weight-bearing as tolerated (WBAT)  -[facility] Name 03/13/18 0912          Bed Mobility Assessment/Treatment    Comment (Bed Mobility) not tested, up in chair  -[facility] Name 03/13/18 0912          Transfer Assessment/Treatment    Transfer Assessment/Treatment sit-stand transfer;stand-sit transfer  -     Sit-Stand [place] (Transfers) independent  -     Stand-Sit [place] (Transfers) independent  -[facility] Name 03/13/18 0912          Sit-Stand Transfer    Assistive Device (Sit-Stand Transfers) walker, front-wheeled  -[facility] Name 03/13/18 0912          Stand-Sit Transfer    Assistive Device (Stand-Sit Transfers) walker, front-wheeled  -[facility] Name 03/13/18 0912          Gait/Stairs Assessment/Training    Gait/Stairs Assessment/Training gait/ambulation independence  -     [place] Level (Gait) independent  -     Assistive Device (Gait) walker, front-wheeled  -     Distance in Feet (Gait) 200  -KH     [place] Level (Stairs) contact guard  -     Handrail Location (Stairs) right side (ascending)  -     Number of Steps (Stairs) 4  -KH     Ascending Technique (Stairs) step-to-step  -[facility] Name 03/13/18 0912          General ROM    RT Lower Ext Rt Knee Extension/Flexion   5-100  -KH     GENERAL ROM COMMENTS WFL except R knee  -[facility] Name 03/13/18 0912          General Assessment (Manual Muscle Testing)    Comment, General Manual Muscle Testing (MMT) Assessment WFL  -[facility] Name 03/13/18 0912          Pain Assessment    Additional Documentation  Pain Scale: Numbers Pre/Post-Treatment (Group)  -     Row Name 03/13/18 0912          Pain Scale: Numbers Pre/Post-Treatment    Pain Scale: Numbers, Pretreatment 9/10  -KH     Pain Scale: Numbers, Post-Treatment 9/10  -KH     Pain Location - Side Right  -KH     Pain Location knee  -KH     Pain Intervention(s) Repositioned;Ambulation/increased activity  -     Row Name             Wound 03/12/18 1353 knee incision    Wound - Properties Group Date first assessed: 03/12/18  -SL Time first assessed: 1353  -SL Location: knee  -SL Type: incision  -SL    Row Name 03/13/18 0912          Positioning and Restraints    Pre-Treatment Position sitting in chair/recliner  -KH     Post Treatment Position chair  -KH     In Chair reclined;call light within reach;encouraged to call for assist;with family/caregiver;notified nsg  -     Row Name 03/13/18 0912          Living Environment    Home Accessibility stairs to enter home  -       User Key  (r) = Recorded By, (t) = Taken By, (c) = Cosigned By    Initials Name Provider Type    CHANA Osorio, PT Physical Therapist    RACHEL Brand RN Registered Nurse              PT Recommendation and Plan  Anticipated Discharge Disposition (PT): home with home health care  Outcome Summary: Pt is s/p R TKA and plans to d/c home later today. Pt ambulated independently with RWx and was able to navigate stairs. Pt was educated on HEP and use of equipment and is safe to d/c home when medically stable. PT will sign off          Outcome Measures     Row Name 03/13/18 1000             How much help from another person do you currently need...    Turning from your back to your side while in flat bed without using bedrails? 4  -KH      Moving from lying on back to sitting on the side of a flat bed without bedrails? 4  -KH      Moving to and from a bed to a chair (including a wheelchair)? 3  -KH      Standing up from a chair using your arms (e.g., wheelchair, bedside chair)?  3  -KH      Climbing 3-5 steps with a railing? 3  -KH      To walk in hospital room? 3  -KH      AM-PAC 6 Clicks Score 20  -KH         Functional Assessment    Outcome Measure Options AM-PAC 6 Clicks Basic Mobility (PT)  -KH        User Key  (r) = Recorded By, (t) = Taken By, (c) = Cosigned By    Initials Name Provider Type    CHANA Osorio, PT Physical Therapist           Time Calculation:         PT Charges     Row Name 03/13/18 1039             Time Calculation    Start Time 0908  -KH      Stop Time 0932  -KH      Time Calculation (min) 24 min  -        User Key  (r) = Recorded By, (t) = Taken By, (c) = Cosigned By    Initials Name Provider Type    CHANA Osorio, PT Physical Therapist          Therapy Charges for Today     Code Description Service Date Service Provider Modifiers Qty    71232125103 HC PT EVAL LOW COMPLEXITY 1 3/13/2018 Silvia Osorio, PT GP 1    92518865004 HC PT THER PROC EA 15 MIN 3/13/2018 Silvia Osorio, PT GP 1    48318283689 HC PT MOBILITY CURRENT 3/13/2018 Silvia Osorio, PT GP, CJ 1    44681718718 HC PT MOBILITY PROJECTED 3/13/2018 Silvia Osorio, PT GP, CJ 1    13056975301 HC PT MOBILITY DISCHARGE 3/13/2018 Silvia Osorio, PT GP, CJ 1          PT G-Codes  Outcome Measure Options: 6 Minute Walk Test  Score: 20  Mobility: Walking and Moving Around Current Status (): At least 20 percent but less than 40 percent impaired, limited or restricted  Mobility: Walking and Moving Around Goal Status (): At least 20 percent but less than 40 percent impaired, limited or restricted  Mobility: Walking and Moving Around Discharge Status (): At least 20 percent but less than 40 percent impaired, limited or restricted    PT Discharge Summary  Anticipated Discharge Disposition (PT): home with home health care  Reason for Discharge: Discharge from facility  Outcomes Achieved: Able to achieve all goals within established  timeline  Discharge Destination: Home with home health    Silvia Osorio, PT  3/13/2018

## 2018-03-13 NOTE — PLAN OF CARE
Problem: Patient Care Overview  Goal: Plan of Care Review  Outcome: Ongoing (interventions implemented as appropriate)   03/12/18 1205   Coping/Psychosocial   Plan of Care Reviewed With patient   Plan of Care Review   Progress improving   OTHER   Outcome Summary good pain control with PO, VSS, ace wrap C/D/I, has voided per urinal, still needs to ambulate, educated on bp meds and monitoring     Goal: Individualization and Mutuality  Outcome: Ongoing (interventions implemented as appropriate)    Goal: Interprofessional Rounds/Family Conf  Outcome: Ongoing (interventions implemented as appropriate)

## 2018-03-13 NOTE — PAYOR COMM NOTE
"Urbano Mares (48 y.o. Male)     Date of Birth Social Security Number Address Home Phone MRN    1970  [address]  [place] [id number] [phone] [id number]    Islam Marital Status          Unknown        Admission Date Admission Type Admitting Provider Attending Provider Department, Room/Bed    3/12/18 Elective Anthony Lujan MD  [address], P891/1    Discharge Date Discharge Disposition Discharge Destination        3/13/2018 [facility]-Health Care [facility]             Attending Provider:  (none)   Allergies:  No Known Allergies    Isolation:  None   Infection:  None   Code Status:  FULL    Ht:  182.9 cm (72\")   Wt:  114 kg (251 lb 1.7 oz)    Admission Cmt:  None   Principal Problem:  Arthritis of right knee [M17.11]                 Active Insurance as of 3/12/2018     Primary Coverage     Payor Plan Insurance Group Employer/Plan Group    [employer] BLUE CROSS [employer] BLUE CROSS BLUE SHIELD PPO [id number]     Payor Plan Address Payor Plan Phone Number Effective From Effective To    [address] [phone] 1/1/2017     [place] [address]       Subscriber Name Subscriber Birth Date Member ID       URMILA MARES [birth year]/26/1964 [id number]                 Emergency Contacts      (Rel.) Home Phone Work Phone Mobile Phone    Urmila Mares (Spouse) -- -- [phone]               Discharge Summary      Anthony Lujan MD at 3/13/2018  8:22 AM           Orthopedic Discharge Summary      Patient: Urbano Mares  YOB: 1970  Medical Record Number: [id number]    Attending Physician: Anthony Lujan MD    Date of Admission: 3/12/2018 10:34 AM  Date of Discharge: 3/13/18    Discharge Diagnosis: RIGHT TOTAL KNEE ARTHROPLASTY WITH CECILY NAVIGATION,   Acute Blood Loss Anemia, stable  Post-operative Pain  Limited mobility, requires use of walker and assistance when OOB.    Presenting Problem/History of Present Illness: Arthritis of right knee " [M17.11]  Status post total left knee replacement [Z96.652]  Arthritis of right knee [M17.11]  Arthritis of right knee [M17.11]        Allergies: No Known Allergies    Discharge Medications     Urbano Mares   [place] Medication Instructions GEM:[id number]    Printed on:03/13/18 0822   Medication Information                      aspirin  MG EC tablet  Take 1 tablet by mouth Every 12 (Twelve) Hours for 82 doses.             bisacodyl (DULCOLAX) 5 MG EC tablet  Take 2 tablets by mouth Daily As Needed for Constipation.             docusate sodium 100 MG capsule  Take 100 mg by mouth 2 (Two) Times a Day.             lisinopril (PRINIVIL,ZESTRIL) 20 MG tablet  Take 1 tablet by mouth Daily.             ondansetron ODT (ZOFRAN-ODT) 4 MG disintegrating tablet  Take 1 tablet by mouth Every 6 (Six) Hours As Needed for Nausea or Vomiting.             oxyCODONE-acetaminophen (PERCOCET) 7.5-325 MG per tablet  1-2 tabs po q 3-4 hr prn severe pain, wean as tolerated             polyethylene glycol (MIRALAX) pack packet  Take 17 g by mouth Daily.                   Past Medical History:   Diagnosis Date   • Arthritis     OSTEO   • Hypertension    • Joint pain         Past Surgical History:   Procedure Laterality Date   • TOTAL KNEE ARTHROPLASTY Left 11/14/2017    Procedure: LEFT TOTAL KNEE ARTHROPLASTY WITH CECILY NAVIGATION;  Surgeon: Anthony Lujan MD;  Location: [facility];  Service:    • WISDOM TOOTH EXTRACTION          Social History     Occupational History   • Not on file.     Social History Main Topics   • Smoking status: Light Tobacco Smoker     Types: Cigars   • Smokeless tobacco: Never Used      Comment: FEW YEARLY    • Alcohol use Yes      Comment: COUPLE TIMES/ WEEK   • Drug use: No   • Sexual activity: Defer    Social History     Social History Narrative   • No narrative on file        Family History   Problem Relation Age of Onset   • Cancer Mother    • Cancer Father      Prostate, Bladder, Brain   •  Hypertension Sister    • Hypertension Brother    • Malig Hyperthermia Neg Hx          Physical Exam: 48 y.o. male Body mass index is 34.06 kg/m².    General Appearance:    Alert, cooperative, in no acute distress                    Vitals:    03/12/18 1919 03/12/18 2300 03/13/18 0300 03/13/18 0700   BP: 139/74 118/66 108/60 121/56   BP Location: Right arm Right arm Left arm Left arm   Patient Position: Lying Sitting Sitting Lying   Pulse: 85 92 87 78   Resp: 16 16 16 16   Temp:  97.4 °F (36.3 °C) 97.5 °F (36.4 °C) 97.9 °F (36.6 °C)   TempSrc:    Oral   SpO2: 96% 96% 97% 96%   Weight:       Height:            DIAGNOSTIC TESTS:   Admission on 03/12/2018   Component Date Value Ref Range Status   • Glucose 03/12/2018 102  70 - 130 mg/dL Final   • Glucose 03/13/2018 169* 65 - 99 mg/dL Final   • BUN 03/13/2018 15  6 - 20 mg/dL Final   • Creatinine 03/13/2018 0.87  0.76 - 1.27 mg/dL Final   • Sodium 03/13/2018 134* 136 - 145 mmol/L Final   • Potassium 03/13/2018 4.3  3.5 - 5.2 mmol/L Final   • Chloride 03/13/2018 98  98 - 107 mmol/L Final   • CO2 03/13/2018 22.1  22.0 - 29.0 mmol/L Final   • Calcium 03/13/2018 8.6  8.6 - 10.5 mg/dL Final   • eGFR Non African Amer 03/13/2018 93  >60 mL/min/1.73 Final   • BUN/Creatinine Ratio 03/13/2018 17.2  7.0 - 25.0 Final   • Anion Gap 03/13/2018 13.9  mmol/L Final   • Hemoglobin 03/13/2018 12.7* 13.7 - 17.6 g/dL Final   • Hematocrit 03/13/2018 40.4  40.4 - 52.2 % Final       No results found.    Hospital Course:  48 y.o. male admitted to [facility] to services of Anthony Lujan MD with Arthritis of right knee [M17.11]  Status post total left knee replacement [Z96.652]  Arthritis of right knee [M17.11]  Arthritis of right knee [M17.11] on 3/12/2018 and underwent RIGHT TOTAL KNEE ARTHROPLASTY WITH CECILY NAVIGATION  Per Anthony Lujan MD. Antibiotic and VTE prophylaxis were per SCIP protocols. Post-operatively the patient transferred to the post-operative floor where the patient  underwent mobilization therapy that included active as well as passive ROM exercises. Opioids were titrated to achieve appropriate pain management to allow for participation in mobilization exercises. Vital signs are now stable. On the day of discharge the wound was clean, dry and intact and calf was soft and non tender and Homans sign was negative. Operative extremity neurovascular status remains intact.   Appropriate education re: incision care, activity levels, medications, and follow up visits was completed and all questions were answered. The patient is now deemed stable for discharge.    Condition on Discharge:  Stable    Discharge Instructions: Patient is to continue with physical therapy exercises twice daily and continue working with the physical therapist as ordered. Patient may weight bear as tolerated unless otherwise specified. Continue EULA hose daily (for six weeks) and ice regularly. Patient instructed on frequent calf pumping exercises.  Patient also instructed on incentive spirometer during hospitalization and encouraged to continue to use at home regularly.  See wound care discharge instructions.    Follow up Instructions:  Follow up in the office with Dr. Anthony David in 2 weeks - patient to call the office at [phone] to schedule. Prescriptions were given for pain medication and blood thinner therapy.    Follow-up Appointments  Future Appointments  Date Time Provider Department Center   3/29/2018 9:40 AM Anthony David MD [facility] None     Additional Instructions for the Follow-ups that You Need to Schedule     Ambulatory Referral to Home Health    As directed      Face to Face Visit Date:  3/12/2018    Follow-up Provider for Plan of Care?:  I will be treating the patient on an ongoing basis.  Please send me the Plan of Care for signature.    Follow-up Provider:  ANTHONY DAVID [[id number]]    Reason/Clinical Findings:  post operativ pain with ambullation, requires use of walker for ambulation     Describe mobility limitations that make leaving home difficult:  requires assist of another to leave home    Nursing/Therapeutic Services Requested:  Skilled Nursing    Skilled nursing orders:  Wound care dressing/changes    Frequency:  1 Week 1         Discharge Follow-up with Specified Provider: Anthony Lujan M.D. at [facility] [phone]; 2 Weeks    As directed      To:  Anthony Lujan M.D. at [facility] [phone]    Follow Up:  2 Weeks         Dressing Change Instructions    As directed      IV. INCISION CARE: May shower and let water run over the incision on post-operative day #5. Do not scrub or rub the incision. Simply let the water run over the incision and pat dry. Keep covered with clean dry dressing as long as there is drainage.    Wash your hands prior to dressing changes  Change the dressing daily as needed to keep incision clean and dry. Utilize dry gauze and paper tape. Avoid touching the side of the gauze that goes against the incision with your hands.  No creams or ointments to the incision  May remove dressing once the incision is free of drainage usually around 5 days post op.  Do not touch or pick at the incision  Check incision every day and notify surgeon immediately if any of the following signs or symptoms are noted:  Increase in baseline redness (bruising is normal)  Increase in baseline swelling around the incision and of the entire extremity  Sudden increase in pain or inability to bend the knee  Drainage oozing from the incision more than 5 days out from surgery.  Pulling apart of the edges of the incision  Increase in overall body temperature (greater than 100.5 degrees) Make sure you are doing your incentive spirometer and deep breathing exercises every day while awake as this is the most frequent cause of low grade fever post operatively.  Your staple removal will be done in the office at your follow-up visit.     Order Comments:  IV. INCISION CARE: May shower and let water run over the incision on post-operative day #5. Do not scrub or rub the incision. Simply let the water run over the incision and pat dry. Keep covered with clean dry dressing as long as there is drainage. Wash your hands prior to dressing changes Change the dressing daily as needed to keep incision clean and dry. Utilize dry gauze and paper tape. Avoid touching the side of the gauze that goes against the incision with your hands. No creams or ointments to the incision May remove dressing once the incision is free of drainage usually around 5 days post op. Do not touch or pick at the incision Check incision every day and notify surgeon immediately if any of the following signs or symptoms are noted: Increase in baseline redness (bruising is normal) Increase in baseline swelling around the incision and of the entire extremity Sudden increase in pain or inability to bend the knee Drainage oozing from the incision more than 5 days out from surgery. Pulling apart of the edges of the incision Increase in overall body temperature (greater than 100.5 degrees) Make sure you are doing your incentive spirometer and deep breathing exercises every day while awake as this is the most frequent cause of low grade fever post operatively. Your staple removal will be done in the office at your follow-up visit.                Discharge Disposition Plan:today to home, home health and when cleared by physical therapy as safe for discharge    Date: 3/13/2018    Anthony Lujan MD        Electronically signed by Anthony Lujan MD at 3/13/2018  8:23 AM

## 2018-03-29 ENCOUNTER — OFFICE VISIT (OUTPATIENT)
Dept: ORTHOPEDIC SURGERY | Facility: CLINIC | Age: 48
End: 2018-03-29

## 2018-03-29 VITALS — BODY MASS INDEX: 32.48 KG/M2 | TEMPERATURE: 97.4 F | WEIGHT: 239.8 LBS | HEIGHT: 72 IN

## 2018-03-29 DIAGNOSIS — Z96.651 STATUS POST TOTAL RIGHT KNEE REPLACEMENT: Primary | ICD-10-CM

## 2018-03-29 PROCEDURE — 73562 X-RAY EXAM OF KNEE 3: CPT | Performed by: ORTHOPAEDIC SURGERY

## 2018-03-29 PROCEDURE — 99024 POSTOP FOLLOW-UP VISIT: CPT | Performed by: ORTHOPAEDIC SURGERY

## 2018-03-29 RX ORDER — OXYCODONE AND ACETAMINOPHEN 7.5; 325 MG/1; MG/1
TABLET ORAL
Qty: 100 TABLET | Refills: 0 | Status: SHIPPED | OUTPATIENT
Start: 2018-03-29 | End: 2018-04-26

## 2018-03-29 NOTE — PATIENT INSTRUCTIONS
DIAGNOSIS: 2 week follow up right total knee arthroplasty    SUBJECTIVE:Patient returns today for 2 week follow up of right total knee replacement. Patient reports doing well with no unusual complaints. Appears to be progressing appropriately.    OBJECTIVE:   Exam:. The incision is healing appropriately. No sign of infection. Range of motion is progressing as expected -10/113. The calf is soft and nontender with a negative Homans sign.    DIAGNOSTIC STUDIES  2V AP&Lat of the right knee were done in the office today    Indication, findings and comparison: images were reviewed for evaluation of recent knee replacement. They demonstrate a well positioned, well aligned knee replacement without complicating factors noted. In comparison with previous films there has been interval implant placement.    ASSESSMENT: 2 week status post right total knee replacement expected healing.    PLAN: 1) Staples removed and steri strips applied   2) Order given for PT and pain medicine (as needed)   3) Continue ice PRN   4) Continue EC Aspirin 325mg by mouth twice a day until 6 weeks post op.   5) Follow up in 4 weeks with repeat Xrays of right knee (2 views)   6) Continue with antibiotic prophylaxis with dental procedures for 2 yrs post total joint replacement.    Niurka Barron, APRN  3/29/2018     Pain Medications utilized after surgery are narcotics and the law requires that the following information be given to all patients that are prescribed narcotics:    CLASSIFICATION: Pain medications are called Opioids and are narcotics  LEGALITIES: It is illegal to share narcotics with others and to drive within 4 hours of taking narcotics  POTENTIAL SIDE EFFECTS: Potential side effects of opioids include: nausea, vomiting, itching, dizziness, drowsiness, dry mouth, constipation, and difficulty urinating.  POTENTIAL ADVERSE EFFECTS:   Opioid tolerance can develop with use of pain medications and this simply means that it requires more and  more of the medication to control pain; however, this is seen more in patients that use opioids for longer periods of time.  Opioid dependence can develop with use of Opioids and this simply means that to stop the medication can cause withdrawal symptoms so wean gradually (do not stop all at once); however, this is seen more with patients that use opioids for longer periods of time.  Opioid addiction can develop with use of Opioids and the incidence of this is very unlikely in patients who take the medications as ordered and stop the medications as instructed.  Opioid overdose can be dangerous, but is unlikely when the medication is taken as ordered and stopped when ordered. It is important not to mix opioids with alcohol or with and type of sedative such as Benadryl as this can lead to over sedation and respiratory difficulty.    DOSAGE:   Pain medications will need to be taken consistently for the first week to decrease pain and promote adequate pain relief and participation in physical therapy.    After the initial surgical pain begins to resolve, you may begin to decrease the pain medication. By the end of 8 weeks, you should be off of pain medications.    Refills will not be given by the office during evening hours, on weekends.  To seek refills on pain medications during the initial 6 week post-operative period, you must call the office 48 hours in advance to request the refill. The office will then notify you when to  the prescription. DO NOT wait until you are out of the medication to request a refill.    A MOUSTAPHA check will be made on-line, and will be repeated if prescription is renewed after a 90 day period. The patient agrees to adhering to the medication regimen as prescribed.

## 2018-03-29 NOTE — PROGRESS NOTES
Urbano Mares : 1970 MRN: [id number] DATE: 3/29/2018  Body mass index is 32.52 kg/m².  Vitals:    18 1053   Temp: 97.4 °F (36.3 °C)       DIAGNOSIS: 2 week follow up right total knee arthroplasty    SUBJECTIVE:Patient returns today for 2 week follow up of right total knee replacement. Patient reports doing well with no unusual complaints. Appears to be progressing appropriately.    OBJECTIVE:   Exam:. The incision is healing appropriately. No sign of infection. Range of motion is progressing as expected -10/113. The calf is soft and nontender with a negative Homans sign.    DIAGNOSTIC STUDIES  2V AP&Lat of the right knee were done in the office today    Indication, findings and comparison: images were reviewed for evaluation of recent knee replacement. They demonstrate a well positioned, well aligned knee replacement without complicating factors noted. In comparison with previous films there has been interval implant placement.    ASSESSMENT: 2 week status post right total knee replacement expected healing.    PLAN: 1) Staples removed and steri strips applied   2) Order given for PT and pain medicine (as needed)   3) Continue ice PRN   4) Continue EC Aspirin 325mg by mouth twice a day until 6 weeks post op.   5) Follow up in 4 weeks with repeat Xrays of right knee (2 views)   6) Continue with antibiotic prophylaxis with dental procedures for 2 yrs post total joint replacement.    JOHN Hinkle  3/29/2018  Seen today by Anthony Lujan M.D. and JOHN Armstrong     Pain Medications utilized after surgery are narcotics and the law requires that the following information be given to all patients that are prescribed narcotics:    CLASSIFICATION: Pain medications are called Opioids and are narcotics  LEGALITIES: It is illegal to share narcotics with others and to drive within 4 hours of taking narcotics  POTENTIAL SIDE EFFECTS: Potential side effects of opioids include: nausea, vomiting, itching,  dizziness, drowsiness, dry mouth, constipation, and difficulty urinating.  POTENTIAL ADVERSE EFFECTS:   Opioid tolerance can develop with use of pain medications and this simply means that it requires more and more of the medication to control pain; however, this is seen more in patients that use opioids for longer periods of time.  Opioid dependence can develop with use of Opioids and this simply means that to stop the medication can cause withdrawal symptoms so wean gradually (do not stop all at once); however, this is seen more with patients that use opioids for longer periods of time.  Opioid addiction can develop with use of Opioids and the incidence of this is very unlikely in patients who take the medications as ordered and stop the medications as instructed.  Opioid overdose can be dangerous, but is unlikely when the medication is taken as ordered and stopped when ordered. It is important not to mix opioids with alcohol or with and type of sedative such as Benadryl as this can lead to over sedation and respiratory difficulty.    DOSAGE:   Pain medications will need to be taken consistently for the first week to decrease pain and promote adequate pain relief and participation in physical therapy.    After the initial surgical pain begins to resolve, you may begin to decrease the pain medication. By the end of 8 weeks, you should be off of pain medications.    Refills will not be given by the office during evening hours, on weekends.  To seek refills on pain medications during the initial 6 week post-operative period, you must call the office 48 hours in advance to request the refill. The office will then notify you when to  the prescription. DO NOT wait until you are out of the medication to request a refill.    A MOUSTAPHA check will be made on-line, and will be repeated if prescription is renewed after a 90 day period. The patient agrees to adhering to the medication regimen as prescribed.

## 2018-04-18 ENCOUNTER — TELEPHONE (OUTPATIENT)
Dept: FAMILY MEDICINE CLINIC | Facility: CLINIC | Age: 48
End: 2018-04-18

## 2018-04-26 ENCOUNTER — OFFICE VISIT (OUTPATIENT)
Dept: ORTHOPEDIC SURGERY | Facility: CLINIC | Age: 48
End: 2018-04-26

## 2018-04-26 VITALS — WEIGHT: 240.2 LBS | HEIGHT: 72 IN | BODY MASS INDEX: 32.53 KG/M2 | TEMPERATURE: 98.3 F

## 2018-04-26 DIAGNOSIS — Z96.651 STATUS POST TOTAL RIGHT KNEE REPLACEMENT: Primary | ICD-10-CM

## 2018-04-26 PROCEDURE — 73560 X-RAY EXAM OF KNEE 1 OR 2: CPT | Performed by: NURSE PRACTITIONER

## 2018-04-26 PROCEDURE — 99024 POSTOP FOLLOW-UP VISIT: CPT | Performed by: NURSE PRACTITIONER

## 2018-04-26 NOTE — PATIENT INSTRUCTIONS
Chief Complaint and HPI: 6 weeks follow up right total knee    SUBJECTIVE:Patient returns today for follow up of total joint replacement. Patient reports doing well with no unusual complaints. Appears to be progressing appropriately. Feels his L TKA is stronger from this therapy on his Right TKA.     OBJECTIVE:   Exam:. The incision is healing appropriately. No sign of infection. Range of motion is progressing as expected-1/125. The calf is soft and nontender with a negative Homans sign.    DIAGNOSTIC STUDIES  Imaging done today, images were personally viewed and discussed with the patient:    Indication, findings and comparison:2V AP&Lat of the operative joint were done in the office today  images were reviewed for evaluation of recent joint replacement. They demonstrate a well positioned, well aligned total joint replacement without complicating factors noted. In comparison with previous films there has been no alignment change.    ASSESSMENT: status post right total knee replacement expected healing.    PLAN: 1) Continue with PT exercises as prescribed   2) Follow up 3 MONTHS  WITH XRAYS (2 views of same joint).   3) Continue with antibiotic prophylaxis with dental procedures for 2 yrs post total joint replacement.   4) May discontinue anticoagulant therapy (such as aspirin or xarelto) from surgery at this time and resume medications, vitamins, and supplements you were taking before surgery.     JOHN Hinkle  4/26/2018  Seen today by nAthony Lujan M.D. and JOHN Armstrong

## 2018-04-26 NOTE — PROGRESS NOTES
Urbano Mares : 1970 MRN: [id number] DATE: 2018  Body mass index is 32.58 kg/m².  Vitals:    18 1040   Temp: 98.3 °F (36.8 °C)       Chief Complaint and HPI: 6 weeks follow up right total knee    SUBJECTIVE:Patient returns today for follow up of total joint replacement. Patient reports doing well with no unusual complaints. Appears to be progressing appropriately.  Feels his L TKA is stronger from this therapy on his Right TKA.     OBJECTIVE:   Exam:. The incision is healing appropriately. No sign of infection. Range of motion is progressing as expected-1/125. The calf is soft and nontender with a negative Homans sign.    DIAGNOSTIC STUDIES  Imaging done today, images were personally viewed and discussed with the patient:    Indication, findings and comparison:2V AP&Lat of the operative joint were done in the office today  images were reviewed for evaluation of recent joint replacement. They demonstrate a well positioned, well aligned total joint replacement without complicating factors noted. In comparison with previous films there has been no alignment change.    ASSESSMENT: status post right total knee replacement expected healing.    PLAN: 1) Continue with PT exercises as prescribed   2) Follow up 3 MONTHS  WITH XRAYS (2 views of same joint).   3) Continue with antibiotic prophylaxis with dental procedures for 2 yrs post total joint replacement.   4) May discontinue anticoagulant therapy (such as aspirin or xarelto) from surgery at this time and resume medications, vitamins, and supplements you were taking before surgery.     JOHN Hinkle  2018  Seen today by Anthony Lujan M.D. and JOHN Armstrong

## 2018-07-26 ENCOUNTER — OFFICE VISIT (OUTPATIENT)
Dept: ORTHOPEDIC SURGERY | Facility: CLINIC | Age: 48
End: 2018-07-26

## 2018-07-26 VITALS — WEIGHT: 244 LBS | TEMPERATURE: 98.5 F | BODY MASS INDEX: 33.05 KG/M2 | HEIGHT: 72 IN

## 2018-07-26 DIAGNOSIS — M25.562 CHRONIC PAIN OF BOTH KNEES: Primary | ICD-10-CM

## 2018-07-26 DIAGNOSIS — M25.561 CHRONIC PAIN OF BOTH KNEES: Primary | ICD-10-CM

## 2018-07-26 DIAGNOSIS — Z96.651 STATUS POST TOTAL RIGHT KNEE REPLACEMENT: ICD-10-CM

## 2018-07-26 DIAGNOSIS — Z96.612 HISTORY OF LEFT SHOULDER REPLACEMENT: ICD-10-CM

## 2018-07-26 DIAGNOSIS — G89.29 CHRONIC PAIN OF BOTH KNEES: Primary | ICD-10-CM

## 2018-07-26 PROCEDURE — 73560 X-RAY EXAM OF KNEE 1 OR 2: CPT | Performed by: NURSE PRACTITIONER

## 2018-07-26 PROCEDURE — 99212 OFFICE O/P EST SF 10 MIN: CPT | Performed by: NURSE PRACTITIONER

## 2018-07-26 NOTE — PROGRESS NOTES
NAME: Urbano Mares  : 1970   MRN: [id number]   DATE: 2018    CC: 6 months Follow up status post total joint replacement on the right and 9 months on the left.     SUBJECTIVE:    HPI: Patient returns today for a follow up of total joint replacement. 6 months Follow up status post total joint replacement on the right and 9 months on the left. Patient reports doing well with no unusual complaints. Appears to be progressing appropriately. Was out on jet skis yesterday and states is doing well.   HPI    This problem is not new to this examiner.     Allergies: No Known Allergies    Medications:   Home Medications:  Current Outpatient Prescriptions on File Prior to Visit   Medication Sig   • lisinopril (PRINIVIL,ZESTRIL) 20 MG tablet Take 1 tablet by mouth Daily.     No current facility-administered medications on file prior to visit.        Current Medications:  Scheduled Meds:  Continuous Infusions:  No current facility-administered medications for this visit.   PRN Meds:.    I have reviewed the patient's medical history in detail and updated the computerized patient record.  Review and summarization of old records include:    Past Medical History:   Diagnosis Date   • Arthritis     OSTEO   • Hypertension    • Joint pain         Past Surgical History:   Procedure Laterality Date   • TOTAL KNEE ARTHROPLASTY Left 2017    Procedure: LEFT TOTAL KNEE ARTHROPLASTY WITH CECILY NAVIGATION;  Surgeon: Anthony Lujan MD;  Location: [facility];  Service:    • TOTAL KNEE ARTHROPLASTY Right 3/12/2018    Procedure: RIGHT TOTAL KNEE ARTHROPLASTY WITH CECILY NAVIGATION;  Surgeon: Anthony Lujan MD;  Location: [facility];  Service: Orthopedics   • WISDOM TOOTH EXTRACTION          Social History     Occupational History   • Not on file.     Social History Main Topics   • Smoking status: Light Tobacco Smoker     Types: Cigars   • Smokeless tobacco: Never Used      Comment: FEW YEARLY    • Alcohol use Yes      " Comment: COUPLE TIMES/ WEEK   • Drug use: No   • Sexual activity: Defer    Social History     Social History Narrative   • No narrative on file        Family History   Problem Relation Age of Onset   • Cancer Mother    • Cancer Father         Prostate, Bladder, Brain   • Hypertension Sister    • Hypertension Brother    • Malig Hyperthermia Neg Hx        ROS: 14 point review of systems was performed and was negative except for documented findings in HPI and today's encounter.     Allergies: No Known Allergies  Constitutional:  Denies fever, shaking or chills   Eyes:  Denies change in visual acuity   HENT:  Denies nasal congestion or sore throat   Respiratory:  Denies cough or shortness of breath   Cardiovascular:  Denies chest pain or severe LE edema   GI:  Denies abdominal pain, nausea, vomiting, bloody stools or diarrhea   Musculoskeletal:  Denies numbness tingling or loss of motor function except as outlined above in history of present illness.  : Denies painful urination or hematuria  Integument:  Denies rash, lesion or ulceration   Neurologic:  Denies headache or focal weakness  Endocrine:  Denies lymphadenopathy  Psych:  Denies confusion or change in mental status   Hem:  Denies active bleeding        OBJECTIVE:     Physical Exam:  Vital Signs:    Wt Readings from Last 3 Encounters:   07/26/18 111 kg (244 lb)   04/26/18 109 kg (240 lb 3.2 oz)   03/29/18 109 kg (239 lb 12.8 oz)     Ht Readings from Last 3 Encounters:   07/26/18 182.9 cm (72\")   04/26/18 182.9 cm (72\")   03/29/18 182.9 cm (72\")     Body mass index is 33.09 kg/m².  Facility age limit for growth percentiles is 20 years.  Vitals:    07/26/18 0921   Temp: 98.5 °F (36.9 °C)       Constitutional: Awake alert and oriented x3, well developed, well nourished, no acute distress, non-toxic appearance.  HEENT:  Normocephalic, Atraumatic, Bilateral external ears normal, Oropharynx moist, No oral exudates, Nose normal.   Respiratory:  No respiratory " distress, No wheezing  CV: No palpitations  Vascular:  Brisk cap refill, Intact distal pulses, No cyanosis, all compartments soft with no signs or symptoms of compartment syndrome or DVT.  Neurologic: Sensation grossly intact to light touch throughout the involved extremity and bilaterally symmetric, deep tendon reflexes are 2+ and bilaterally symmetric, No focal deficits noted.   Neck:  Normal range of motion, No tenderness, Supple, Negative Spurlings.  Integument: Well hydrated, no rash, warm, dry, no lesions or ulceration.   Musculoskeletal:  Affected extremity post op incision is healed appropriately. No sign of infection. Range of motion is progressing as expected. The calf is soft and nontender with a negative Homans sign. Distal pulses intact. Normal amount of swelling present for recovery time.     DIAGNOSTIC STUDIES  Imaging done today, images were personally viewed and discussed with the patient:  Indication, findings and comparison: 2V AP&Lat of the operative joint viewed for evaluation of past joint replacement and discussed with patient. They demonstrate a well positioned, well aligned total joint replacement without complicating factors noted. In comparison with the film from the last office visit, there has been no alignment change.    ASSESSMENT: Status post bilateral total knee replacement with expected healing    PLAN: 1) Continue home PT exercises as needed.   2) Continue with antibiotic prophylaxis with dental procedures for 2 yrs post total joint replacement.   3) Follow up only as needed in the future.     7/26/2018  Patient was seen by JOHN Armstrong in the office today.

## 2018-09-05 ENCOUNTER — OFFICE VISIT (OUTPATIENT)
Dept: FAMILY MEDICINE CLINIC | Facility: CLINIC | Age: 48
End: 2018-09-05

## 2018-09-05 VITALS
HEART RATE: 57 BPM | WEIGHT: 246 LBS | BODY MASS INDEX: 33.32 KG/M2 | SYSTOLIC BLOOD PRESSURE: 138 MMHG | DIASTOLIC BLOOD PRESSURE: 92 MMHG | HEIGHT: 72 IN | OXYGEN SATURATION: 98 %

## 2018-09-05 DIAGNOSIS — L24.7 IRRITANT CONTACT DERMATITIS DUE TO PLANTS, EXCEPT FOOD: Primary | ICD-10-CM

## 2018-09-05 PROCEDURE — 99212 OFFICE O/P EST SF 10 MIN: CPT | Performed by: NURSE PRACTITIONER

## 2018-09-05 RX ORDER — PREDNISONE 20 MG/1
TABLET ORAL
Qty: 19 TABLET | Refills: 0 | Status: SHIPPED | OUTPATIENT
Start: 2018-09-05 | End: 2021-08-04

## 2018-09-05 NOTE — PROGRESS NOTES
"Urbano Mares is a 48 y.o. male.Urbano has had a rash on his arms for 5 days, since then the rash has presented in other areas. Using Calamine lotion.       Assessment/Plan   Problem List Items Addressed This Visit     None      Visit Diagnoses     Irritant contact dermatitis due to plants, except food    -  Primary             No Follow-up on file.  There are no Patient Instructions on file for this visit.    Chief Complaint   Patient presents with   • Rash     Social History   Substance Use Topics   • Smoking status: Light Tobacco Smoker     Types: Cigars   • Smokeless tobacco: Never Used      Comment: FEW YEARLY    • Alcohol use Yes      Comment: COUPLE TIMES/ WEEK       History of Present Illness     The following portions of the patient's history were reviewed and updated as appropriate:PMHroutine: Social history , Allergies, Current Medications, Active Problem List and Health Maintenance    Review of Systems   Constitutional: Negative for activity change and appetite change.   Respiratory: Negative for cough.    Skin: Positive for rash.       Objective   Vitals:    09/05/18 0929   BP: 138/92   Pulse: 57   SpO2: 98%   Weight: 112 kg (246 lb)   Height: 182.9 cm (72\")     Body mass index is 33.36 kg/m².  Physical Exam   Constitutional: He appears well-developed and well-nourished. No distress.   HENT:   Head: Normocephalic and atraumatic.   Pulmonary/Chest: Effort normal.   Musculoskeletal: Normal range of motion.   Neurological: He is alert.   Skin: Rash noted.   Vesicular linear papular pruritic rash to a 4 extremities.   Nursing note and vitals reviewed.    Reviewed Data:  No visits with results within 1 Month(s) from this visit.   Latest known visit with results is:   Admission on 03/12/2018, Discharged on 03/13/2018   Component Date Value Ref Range Status   • Glucose 03/12/2018 102  70 - 130 mg/dL Final   • Glucose 03/13/2018 169* 65 - 99 mg/dL Final   • BUN 03/13/2018 15  6 - 20 mg/dL Final   • " Creatinine 03/13/2018 0.87  0.76 - 1.27 mg/dL Final   • Sodium 03/13/2018 134* 136 - 145 mmol/L Final   • Potassium 03/13/2018 4.3  3.5 - 5.2 mmol/L Final   • Chloride 03/13/2018 98  98 - 107 mmol/L Final   • CO2 03/13/2018 22.1  22.0 - 29.0 mmol/L Final   • Calcium 03/13/2018 8.6  8.6 - 10.5 mg/dL Final   • eGFR Non African Amer 03/13/2018 93  >60 mL/min/1.73 Final   • BUN/Creatinine Ratio 03/13/2018 17.2  7.0 - 25.0 Final   • Anion Gap 03/13/2018 13.9  mmol/L Final   • Hemoglobin 03/13/2018 12.7* 13.7 - 17.6 g/dL Final   • Hematocrit 03/13/2018 40.4  40.4 - 52.2 % Final

## 2019-04-02 ENCOUNTER — CLINICAL SUPPORT (OUTPATIENT)
Dept: FAMILY MEDICINE CLINIC | Facility: CLINIC | Age: 49
End: 2019-04-02

## 2019-04-02 DIAGNOSIS — Z23 NEED FOR VACCINATION: Primary | ICD-10-CM

## 2019-04-02 PROCEDURE — 90714 TD VACC NO PRESV 7 YRS+ IM: CPT | Performed by: NURSE PRACTITIONER

## 2019-04-02 PROCEDURE — 90471 IMMUNIZATION ADMIN: CPT | Performed by: NURSE PRACTITIONER

## 2019-04-02 NOTE — PROGRESS NOTES
Subjective   Urbano Mares is a 49 y.o. male.     History of Present Illness     The following portions of the patient's history were reviewed and updated as appropriate: allergies, current medications, past family history, past medical history, past social history, past surgical history and problem list.    Review of Systems    Objective   Physical Exam    Assessment/Plan   {Assess/PlanSmartLinks:[id number]}

## 2020-04-09 ENCOUNTER — TELEPHONE (OUTPATIENT)
Dept: FAMILY MEDICINE CLINIC | Facility: CLINIC | Age: 50
End: 2020-04-09

## 2020-04-09 NOTE — TELEPHONE ENCOUNTER
Avs discussed with Kyra Walker by Discharge Nurse Queenie Crawford LPN with [facility]. Dicussed with dad pt will get vaccines today. Pt will need to visit the dentist, Dr Omer Piper office info given. Explained that the is a fee for service and not part of the [facility]. He will need to apply for the carecard. Applications given and explained to mom. Pt will make appt to see Clint Santa to further discuss the care card. Pt will be in gym waiting for vaccines.   AVS printed and given to patient Queenie Crawford LPN PT IS CALLING IN REGARDS TO MEETING WITH SARAH SULLIVAN. PT STATES SHE WANTED TO CHECK IN WITH HIM.    PLEASE CALL AND ADVISE [phone]

## 2020-04-09 NOTE — TELEPHONE ENCOUNTER
"Called patient to follow-up per message in the chart. He states that Belem called in a medication for him last Friday and was told to call and let her know that he was doing well. He states that he's tolerating medication and doing \"well\" and just wanted to let her know that.   "

## 2021-03-01 ENCOUNTER — OFFICE VISIT (OUTPATIENT)
Dept: FAMILY MEDICINE CLINIC | Facility: CLINIC | Age: 51
End: 2021-03-01

## 2021-03-01 VITALS
RESPIRATION RATE: 15 BRPM | WEIGHT: 270 LBS | HEART RATE: 73 BPM | SYSTOLIC BLOOD PRESSURE: 130 MMHG | OXYGEN SATURATION: 98 % | TEMPERATURE: 97.8 F | BODY MASS INDEX: 36.62 KG/M2 | DIASTOLIC BLOOD PRESSURE: 96 MMHG

## 2021-03-01 DIAGNOSIS — I10 HYPERTENSION, UNSPECIFIED TYPE: ICD-10-CM

## 2021-03-01 DIAGNOSIS — Z00.00 PHYSICAL EXAM: Primary | ICD-10-CM

## 2021-03-01 LAB
ALBUMIN SERPL-MCNC: 4.6 G/DL (ref 3.5–5.2)
ALBUMIN/GLOB SERPL: 2.2 G/DL
ALP SERPL-CCNC: 64 U/L (ref 39–117)
ALT SERPL-CCNC: 44 U/L (ref 1–41)
AST SERPL-CCNC: 24 U/L (ref 1–40)
BILIRUB SERPL-MCNC: 0.5 MG/DL (ref 0–1.2)
BUN SERPL-MCNC: 16 MG/DL (ref 6–20)
BUN/CREAT SERPL: 17.4 (ref 7–25)
CALCIUM SERPL-MCNC: 9.5 MG/DL (ref 8.6–10.5)
CHLORIDE SERPL-SCNC: 102 MMOL/L (ref 98–107)
CHOLEST SERPL-MCNC: 206 MG/DL (ref 0–200)
CO2 SERPL-SCNC: 26.7 MMOL/L (ref 22–29)
CREAT SERPL-MCNC: 0.92 MG/DL (ref 0.76–1.27)
GLOBULIN SER CALC-MCNC: 2.1 GM/DL
GLUCOSE SERPL-MCNC: 121 MG/DL (ref 65–99)
HDLC SERPL-MCNC: 38 MG/DL (ref 40–60)
LDLC SERPL CALC-MCNC: 120 MG/DL (ref 0–100)
LDLC/HDLC SERPL: 2.97 {RATIO}
POTASSIUM SERPL-SCNC: 4.3 MMOL/L (ref 3.5–5.2)
PROT SERPL-MCNC: 6.7 G/DL (ref 6–8.5)
SODIUM SERPL-SCNC: 141 MMOL/L (ref 136–145)
TRIGL SERPL-MCNC: 275 MG/DL (ref 0–150)
VLDLC SERPL CALC-MCNC: 48 MG/DL (ref 5–40)

## 2021-03-01 PROCEDURE — 99396 PREV VISIT EST AGE 40-64: CPT | Performed by: NURSE PRACTITIONER

## 2021-03-01 RX ORDER — LISINOPRIL 20 MG/1
20 TABLET ORAL DAILY
Qty: 90 TABLET | Refills: 1 | Status: SHIPPED | OUTPATIENT
Start: 2021-03-01 | End: 2022-09-08 | Stop reason: SDUPTHER

## 2021-03-01 NOTE — PROGRESS NOTES
Subjective   Urbano Mares is a 50 y.o. male.   PMHX:hypertension  PSHX:reviewed in chart and with pt, nothing new  PFHX:sister and brother have hypertension  Exercise:nothing regular  Diet:well balanced  Sleep hygiene:good  Employment status:Owns a car shop      History of Present Illness   See above  The following portions of the patient's history were reviewed and updated as appropriate: allergies, current medications, past family history, past medical history, past social history, past surgical history and problem list.    Review of Systems   Constitutional: Negative for activity change, appetite change, chills and fever.   HENT: Negative for congestion.    Eyes: Negative for pain.   Respiratory: Negative for cough and shortness of breath.    Cardiovascular: Negative for chest pain and leg swelling.   Gastrointestinal: Negative for nausea and vomiting.   Genitourinary: Negative for difficulty urinating.   Skin: Negative for rash.   Neurological: Negative for dizziness, facial asymmetry and headache.       Objective   Physical Exam  Vitals signs and nursing note reviewed.   Constitutional:       General: He is not in acute distress.     Appearance: He is well-developed.   HENT:      Head: Normocephalic and atraumatic.      Right Ear: External ear normal.      Left Ear: External ear normal.   Eyes:      Pupils: Pupils are equal, round, and reactive to light.   Neck:      Musculoskeletal: Neck supple.   Cardiovascular:      Rate and Rhythm: Normal rate and regular rhythm.   Pulmonary:      Effort: Pulmonary effort is normal.      Breath sounds: Normal breath sounds.   Lymphadenopathy:      Cervical: No cervical adenopathy.   Skin:     General: Skin is warm and dry.   Neurological:      Mental Status: He is alert and oriented to person, place, and time.     Repeat /84      Assessment/Plan   Diagnoses and all orders for this visit:    1. Physical exam (Primary)  -     Comprehensive Metabolic Panel  -      Lipid Panel With LDL / HDL Ratio    2. Hypertension, unspecified type  -     lisinopril (PRINIVIL,ZESTRIL) 20 MG tablet; Take 1 tablet by mouth Daily.  Dispense: 90 tablet; Refill: 1  -     Comprehensive Metabolic Panel  -     Lipid Panel With LDL / HDL Ratio      Will monitor his blood pressure  Will call with lab results  Follow up in a year.  Preventative counseling for diet and exercise with patient at visit.  Pt reports that they wear their seatbelt regularly.

## 2021-03-30 ENCOUNTER — BULK ORDERING (OUTPATIENT)
Dept: CASE MANAGEMENT | Facility: OTHER | Age: 51
End: 2021-03-30

## 2021-03-30 DIAGNOSIS — Z23 IMMUNIZATION DUE: ICD-10-CM

## 2021-08-04 ENCOUNTER — OFFICE VISIT (OUTPATIENT)
Dept: FAMILY MEDICINE CLINIC | Facility: CLINIC | Age: 51
End: 2021-08-04

## 2021-08-04 VITALS — HEIGHT: 72 IN | WEIGHT: 264 LBS | BODY MASS INDEX: 35.76 KG/M2

## 2021-08-04 DIAGNOSIS — L25.9 CONTACT DERMATITIS, UNSPECIFIED CONTACT DERMATITIS TYPE, UNSPECIFIED TRIGGER: Primary | ICD-10-CM

## 2021-08-04 PROCEDURE — 99213 OFFICE O/P EST LOW 20 MIN: CPT | Performed by: NURSE PRACTITIONER

## 2021-08-04 PROCEDURE — 96372 THER/PROPH/DIAG INJ SC/IM: CPT | Performed by: NURSE PRACTITIONER

## 2021-08-04 RX ORDER — PREDNISONE 20 MG/1
TABLET ORAL
Qty: 13 TABLET | Refills: 0 | Status: SHIPPED | OUTPATIENT
Start: 2021-08-04 | End: 2022-09-08

## 2021-08-04 RX ORDER — TRIAMCINOLONE ACETONIDE 40 MG/ML
40 INJECTION, SUSPENSION INTRA-ARTICULAR; INTRAMUSCULAR ONCE
Status: COMPLETED | OUTPATIENT
Start: 2021-08-04 | End: 2021-08-04

## 2021-08-04 RX ADMIN — TRIAMCINOLONE ACETONIDE 40 MG: 40 INJECTION, SUSPENSION INTRA-ARTICULAR; INTRAMUSCULAR at 16:15

## 2021-08-04 NOTE — PROGRESS NOTES
Subjective   Urbano Mares is a 51 y.o. male.   Rash    History of Present Illness   Dermatitic rash that has been going on for weeks. He has been using otc lotion which has helped some but has not helped what is in his groin.He gets this every summer when he is cleaning up his yard.    The following portions of the patient's history were reviewed and updated as appropriate: allergies, current medications, past family history, past medical history, past social history, past surgical history and problem list.    Review of Systems   Constitutional: Negative for activity change and appetite change.   Skin: Positive for rash.       Objective   Physical Exam  Vitals and nursing note reviewed.   Constitutional:       General: He is not in acute distress.     Appearance: Normal appearance. He is normal weight.   HENT:      Head: Normocephalic and atraumatic.   Pulmonary:      Effort: Pulmonary effort is normal.   Skin:     Findings: Rash present.      Comments: Linear confluent pruritic rash to legs,arms,chest and groin area.   Neurological:      Mental Status: He is alert.           Assessment/Plan   Problem List Items Addressed This Visit     None      Visit Diagnoses     Contact dermatitis, unspecified contact dermatitis type, unspecified trigger    -  Primary    Relevant Medications    triamcinolone acetonide (KENALOG-40) injection 40 mg (Completed)        Prednisone tapered ordered for 10 days.  Return for worsening symptoms       Return if symptoms worsen or fail to improve.

## 2021-12-04 ENCOUNTER — IMMUNIZATION (OUTPATIENT)
Dept: VACCINE CLINIC | Facility: HOSPITAL | Age: 51
End: 2021-12-04

## 2021-12-04 PROCEDURE — 91300 HC SARSCOV02 VAC 30MCG/0.3ML IM: CPT | Performed by: INTERNAL MEDICINE

## 2021-12-04 PROCEDURE — 0004A HC ADM SARSCOV2 30MCG/0.3ML BOOSTER: CPT | Performed by: INTERNAL MEDICINE

## 2022-09-08 ENCOUNTER — OFFICE VISIT (OUTPATIENT)
Dept: FAMILY MEDICINE CLINIC | Facility: CLINIC | Age: 52
End: 2022-09-08

## 2022-09-08 VITALS
BODY MASS INDEX: 35.21 KG/M2 | RESPIRATION RATE: 16 BRPM | SYSTOLIC BLOOD PRESSURE: 142 MMHG | OXYGEN SATURATION: 98 % | HEIGHT: 72 IN | HEART RATE: 71 BPM | WEIGHT: 260 LBS | DIASTOLIC BLOOD PRESSURE: 90 MMHG

## 2022-09-08 DIAGNOSIS — Z12.11 COLON CANCER SCREENING: ICD-10-CM

## 2022-09-08 DIAGNOSIS — L24.7 IRRITANT CONTACT DERMATITIS DUE TO PLANTS, EXCEPT FOOD: Primary | ICD-10-CM

## 2022-09-08 DIAGNOSIS — I10 HYPERTENSION, UNSPECIFIED TYPE: ICD-10-CM

## 2022-09-08 PROCEDURE — 99213 OFFICE O/P EST LOW 20 MIN: CPT | Performed by: NURSE PRACTITIONER

## 2022-09-08 RX ORDER — LISINOPRIL 20 MG/1
20 TABLET ORAL DAILY
Qty: 90 TABLET | Refills: 1 | Status: SHIPPED | OUTPATIENT
Start: 2022-09-08 | End: 2023-03-06

## 2022-09-08 RX ORDER — PREDNISONE 20 MG/1
40 TABLET ORAL DAILY
Qty: 10 TABLET | Refills: 0 | Status: SHIPPED | OUTPATIENT
Start: 2022-09-08 | End: 2022-09-13

## 2022-09-08 NOTE — PROGRESS NOTES
Subjective   Urbano Mares is a 52 y.o. male.     History of Present Illness   Established Patient, New to this provider.  Acute visit for rash    Patient here with poison ivy or sumac contact dermatitis and blistering. Cleaning off fence line this week. Blisters and rash to R arm/hand, R abdomen and R neck. + itch. Using calamine lotion, ivy dry powder.    Chronic hypertension x years, he has been managed on lisinopril 20 mg qd. He was running low and has been taking qod. No chest pain, palpitation, headache or vision changes.  Today blood pressure 142/90.  Patient is having discomfort due to contact dermatitis.  BMI 35.    Patient is overdue for annual exam and screening.  He is open to Coluard at home colon cancer screening today and will reschedule for annual exam    The following portions of the patient's history were reviewed and updated as appropriate: allergies, current medications, past family history, past medical history, past social history, past surgical history and problem list.    Review of Systems    Objective   Physical Exam  Constitutional:       Appearance: Normal appearance.   HENT:      Head: Normocephalic.      Nose: Nose normal.      Mouth/Throat:      Mouth: Mucous membranes are moist.   Cardiovascular:      Rate and Rhythm: Normal rate and regular rhythm.      Pulses: Normal pulses.      Heart sounds: Normal heart sounds.   Pulmonary:      Effort: Pulmonary effort is normal.      Breath sounds: Normal breath sounds.   Abdominal:      Palpations: Abdomen is soft.   Skin:     Findings: Rash present.             Comments: Red, raised, blistered rash after cleaning off a fence line   Neurological:      General: No focal deficit present.      Mental Status: He is alert.         Vitals:    09/08/22 1413   BP: 142/90   Pulse: 71   Resp: 16   SpO2: 98%     Body mass index is 35.26 kg/m².    Procedures    Assessment & Plan   Problems Addressed this Visit        Cardiac and Vasculature    BP (high  blood pressure)    Relevant Medications    lisinopril (PRINIVIL,ZESTRIL) 20 MG tablet      Other Visit Diagnoses     Irritant contact dermatitis due to plants, except food    -  Primary    Colon cancer screening        Relevant Orders    Cologuard - Stool, Per Rectum      Diagnoses       Codes Comments    Irritant contact dermatitis due to plants, except food    -  Primary ICD-10-CM: L24.7  ICD-9-CM: 692.6     Hypertension, unspecified type     ICD-10-CM: I10  ICD-9-CM: 401.9     Colon cancer screening     ICD-10-CM: Z12.11  ICD-9-CM: V76.51         Orders Placed This Encounter   Procedures   • Cologuard - Stool, Per Rectum     Standing Status:   Future     Standing Expiration Date:   9/8/2023     Order Specific Question:   Release to patient     Answer:   Routine Release     Dermatitis-encourage patient to wear long sleeves and gloves, eye protection when trimming.  Rx prednisone 40 mg daily x5 days, continue with cool compresses calamine lotion as needed for itch    Hypertension-refill lisinopril 20 mg.  Patient to get new home blood pressure cuff and send BP log via [employer].  Goal less than 130/80.  Encourage patient to drink water and walk daily    Colon cancer screening-patient is open to Cologuard, ordered today         Education provided in AVS   Return in about 3 months (around 12/8/2022) for Annual.

## 2022-10-06 ENCOUNTER — OFFICE VISIT (OUTPATIENT)
Dept: FAMILY MEDICINE CLINIC | Facility: CLINIC | Age: 52
End: 2022-10-06

## 2022-10-06 VITALS
DIASTOLIC BLOOD PRESSURE: 88 MMHG | RESPIRATION RATE: 14 BRPM | SYSTOLIC BLOOD PRESSURE: 128 MMHG | BODY MASS INDEX: 34.4 KG/M2 | OXYGEN SATURATION: 99 % | HEART RATE: 84 BPM | HEIGHT: 72 IN | WEIGHT: 254 LBS

## 2022-10-06 DIAGNOSIS — Z13.228 SCREENING FOR METABOLIC DISORDER: ICD-10-CM

## 2022-10-06 DIAGNOSIS — Z13.220 SCREENING, LIPID: ICD-10-CM

## 2022-10-06 DIAGNOSIS — Z00.00 PREVENTATIVE HEALTH CARE: Primary | ICD-10-CM

## 2022-10-06 DIAGNOSIS — G57.93 NEUROPATHY OF BOTH FEET: ICD-10-CM

## 2022-10-06 DIAGNOSIS — I10 PRIMARY HYPERTENSION: ICD-10-CM

## 2022-10-06 DIAGNOSIS — Z11.59 ENCOUNTER FOR HEPATITIS C SCREENING TEST FOR LOW RISK PATIENT: ICD-10-CM

## 2022-10-06 PROCEDURE — 90471 IMMUNIZATION ADMIN: CPT | Performed by: NURSE PRACTITIONER

## 2022-10-06 PROCEDURE — 91312 COVID-19 (PFIZER) BIVALENT BOOSTER 12+YRS: CPT | Performed by: NURSE PRACTITIONER

## 2022-10-06 PROCEDURE — 90686 IIV4 VACC NO PRSV 0.5 ML IM: CPT | Performed by: NURSE PRACTITIONER

## 2022-10-06 PROCEDURE — 99396 PREV VISIT EST AGE 40-64: CPT | Performed by: NURSE PRACTITIONER

## 2022-10-06 PROCEDURE — 0124A PR ADM SARSCOV2 30MCG/0.3ML BST: CPT | Performed by: NURSE PRACTITIONER

## 2022-10-06 NOTE — PROGRESS NOTES
Subjective   Urbano Mares is a 52 y.o. male.     History of Present Illness   Established patient. New to this provider. Due for annual exam , labs, vaccinations, chronic illness management.    Chronic htn x years. BP is 128/88. He is taking lisinopril 20 qd. Denies cough, no chest pain, palps, or headache.     Patient reports some numbness and tingling in both feet that started after knee replacements. He wonders if this was related to knee replacements. He is not diabetic. Labs are due. Denies claudications. Smokes some cigars, not daily.     The following portions of the patient's history were reviewed and updated as appropriate: allergies, current medications, past family history, past medical history, past social history, past surgical history and problem list.    Review of Systems   Constitutional: Negative for activity change, chills, fatigue and unexpected weight loss.   HENT: Negative for congestion and sinus pressure.         Dental exam is utd     Eyes: Negative for visual disturbance.        Readers only, no recent eye exam    Respiratory: Negative for cough, shortness of breath and wheezing.    Cardiovascular: Negative for chest pain, palpitations and leg swelling.   Gastrointestinal: Negative for abdominal distention, blood in stool, constipation, diarrhea, nausea, vomiting and GERD (rare).   Endocrine: Negative for cold intolerance, heat intolerance, polydipsia, polyphagia and polyuria.   Genitourinary: Negative for dysuria, scrotal swelling, testicular pain and urinary incontinence.   Skin: Negative for skin lesions.   Allergic/Immunologic: Negative for environmental allergies.   Neurological: Negative for weakness and headache.   Hematological: Does not bruise/bleed easily.   Psychiatric/Behavioral: Negative for sleep disturbance and depressed mood. The patient is not nervous/anxious.        Objective   Physical Exam  Vitals reviewed.   Constitutional:       Appearance: Normal appearance. He is  obese.   HENT:      Head: Normocephalic.      Right Ear: Tympanic membrane normal.      Left Ear: Tympanic membrane normal.      Nose: Nose normal.      Mouth/Throat:      Mouth: Mucous membranes are moist.   Eyes:      Pupils: Pupils are equal, round, and reactive to light.   Cardiovascular:      Rate and Rhythm: Normal rate and regular rhythm.      Pulses: Normal pulses.           Dorsalis pedis pulses are 2+ on the right side and 2+ on the left side.        Posterior tibial pulses are 2+ on the right side and 2+ on the left side.      Heart sounds: Normal heart sounds.   Pulmonary:      Effort: Pulmonary effort is normal.      Breath sounds: Normal breath sounds.   Abdominal:      General: Bowel sounds are normal.      Palpations: Abdomen is soft.   Musculoskeletal:         General: Normal range of motion.      Cervical back: Normal range of motion.   Feet:      Right foot:      Skin integrity: Skin integrity normal.      Left foot:      Skin integrity: Skin integrity normal.   Skin:     General: Skin is warm.   Neurological:      General: No focal deficit present.      Mental Status: He is alert.   Psychiatric:         Mood and Affect: Mood normal.         Vitals:    10/06/22 1342   BP: 128/88   Pulse: 84   Resp: 14   SpO2: 99%     Body mass index is 34.45 kg/m².    Procedures    Assessment & Plan   Problems Addressed this Visit        Cardiac and Vasculature    BP (high blood pressure)    Relevant Orders    Comprehensive Metabolic Panel    Lipid Panel With / Chol / HDL Ratio      Other Visit Diagnoses     Preventative health care    -  Primary    Screening, lipid        Relevant Orders    Lipid Panel With / Chol / HDL Ratio    Screening for metabolic disorder        Relevant Orders    Comprehensive Metabolic Panel    Encounter for hepatitis C screening test for low risk patient        Relevant Orders    Hepatitis C Antibody    Neuropathy of both feet        Relevant Orders    Vitamin B12    Folate       Diagnoses       Codes Comments    Preventative health care    -  Primary ICD-10-CM: Z00.00  ICD-9-CM: V70.0     Primary hypertension     ICD-10-CM: I10  ICD-9-CM: 401.9     Screening, lipid     ICD-10-CM: Z13.220  ICD-9-CM: V77.91     Screening for metabolic disorder     ICD-10-CM: Z13.228  ICD-9-CM: V77.99     Encounter for hepatitis C screening test for low risk patient     ICD-10-CM: Z11.59  ICD-9-CM: V73.89     Neuropathy of both feet     ICD-10-CM: G57.93  ICD-9-CM: 356.9         Orders Placed This Encounter   Procedures   • COVID-19 Bivalent Booster (Pfizer) 12+yrs   • FluLaval/Fluzone >6 mos ([id number])   • Comprehensive Metabolic Panel     Order Specific Question:   Release to patient     Answer:   Routine Release   • Lipid Panel With / Chol / HDL Ratio     Order Specific Question:   Release to patient     Answer:   Routine Release   • Hepatitis C Antibody     Order Specific Question:   Release to patient     Answer:   Routine Release   • Vitamin B12     Order Specific Question:   Release to patient     Answer:   Routine Release   • Folate     Order Specific Question:   Release to patient     Answer:   Routine Release     Preventative care- Follow heart healthy diet, drink water, walk daily. Wear seatbelts, wear helmets, wear sunscreens. Follow CDC guidelines for covid pandemic.     Hypertension-continue with lisinopril 20 daily, check labs, follow heart healthy diet, low-sodium diet, walk daily and drink water    Neuropathy-check B12 and folate, keep feet clean and dry, inspect daily, walk daily.  Patient would benefit from limiting carbs/sweets and weight loss.       Education provided in AVS   Return in about 6 months (around 4/6/2023) for Recheck.

## 2022-10-07 LAB
ALBUMIN SERPL-MCNC: 5.2 G/DL (ref 3.8–4.9)
ALBUMIN/GLOB SERPL: 2.2 {RATIO} (ref 1.2–2.2)
ALP SERPL-CCNC: 66 IU/L (ref 44–121)
ALT SERPL-CCNC: 39 IU/L (ref 0–44)
AST SERPL-CCNC: 22 IU/L (ref 0–40)
BILIRUB SERPL-MCNC: 0.6 MG/DL (ref 0–1.2)
BUN SERPL-MCNC: 16 MG/DL (ref 6–24)
BUN/CREAT SERPL: 17 (ref 9–20)
CALCIUM SERPL-MCNC: 9.9 MG/DL (ref 8.7–10.2)
CHLORIDE SERPL-SCNC: 100 MMOL/L (ref 96–106)
CHOLEST SERPL-MCNC: 254 MG/DL (ref 100–199)
CHOLEST/HDLC SERPL: 6.4 RATIO (ref 0–5)
CO2 SERPL-SCNC: 23 MMOL/L (ref 20–29)
CREAT SERPL-MCNC: 0.94 MG/DL (ref 0.76–1.27)
EGFRCR SERPLBLD CKD-EPI 2021: 96 ML/MIN/1.73
GLOBULIN SER CALC-MCNC: 2.4 G/DL (ref 1.5–4.5)
GLUCOSE SERPL-MCNC: 97 MG/DL (ref 70–99)
HCV AB S/CO SERPL IA: <0.1 S/CO RATIO (ref 0–0.9)
HDLC SERPL-MCNC: 40 MG/DL
LDLC SERPL CALC-MCNC: 161 MG/DL (ref 0–99)
POTASSIUM SERPL-SCNC: 5.3 MMOL/L (ref 3.5–5.2)
PROT SERPL-MCNC: 7.6 G/DL (ref 6–8.5)
SODIUM SERPL-SCNC: 140 MMOL/L (ref 134–144)
TRIGL SERPL-MCNC: 281 MG/DL (ref 0–149)
VIT B12 SERPL-MCNC: 674 PG/ML (ref 232–1245)
VLDLC SERPL CALC-MCNC: 53 MG/DL (ref 5–40)

## 2023-03-04 DIAGNOSIS — I10 HYPERTENSION, UNSPECIFIED TYPE: ICD-10-CM

## 2023-03-06 RX ORDER — LISINOPRIL 20 MG/1
20 TABLET ORAL DAILY
Qty: 90 TABLET | Refills: 1 | Status: SHIPPED | OUTPATIENT
Start: 2023-03-06

## 2023-04-11 ENCOUNTER — OFFICE VISIT (OUTPATIENT)
Dept: FAMILY MEDICINE CLINIC | Facility: CLINIC | Age: 53
End: 2023-04-11
Payer: COMMERCIAL

## 2023-04-11 VITALS
HEART RATE: 68 BPM | HEIGHT: 72 IN | OXYGEN SATURATION: 95 % | WEIGHT: 251 LBS | DIASTOLIC BLOOD PRESSURE: 82 MMHG | BODY MASS INDEX: 34 KG/M2 | RESPIRATION RATE: 20 BRPM | SYSTOLIC BLOOD PRESSURE: 122 MMHG

## 2023-04-11 DIAGNOSIS — I10 ESSENTIAL HYPERTENSION: ICD-10-CM

## 2023-04-11 DIAGNOSIS — J40 BRONCHITIS: Primary | ICD-10-CM

## 2023-04-11 RX ORDER — DEXTROMETHORPHAN HYDROBROMIDE AND PROMETHAZINE HYDROCHLORIDE 15; 6.25 MG/5ML; MG/5ML
5 SYRUP ORAL 4 TIMES DAILY PRN
Qty: 180 ML | Refills: 0 | Status: SHIPPED | OUTPATIENT
Start: 2023-04-11

## 2023-04-11 RX ORDER — BENZONATATE 100 MG/1
100 CAPSULE ORAL 3 TIMES DAILY PRN
Qty: 30 CAPSULE | Refills: 0 | Status: SHIPPED | OUTPATIENT
Start: 2023-04-11 | End: 2023-04-21

## 2023-04-11 RX ORDER — METHYLPREDNISOLONE 4 MG/1
TABLET ORAL
Qty: 21 TABLET | Refills: 0 | Status: SHIPPED | OUTPATIENT
Start: 2023-04-11

## 2023-04-11 NOTE — PROGRESS NOTES
Subjective   Urbano Mares is a 53 y.o. male.     History of Present Illness   Estab pt here for cough and follow up     He has been coughing x 1 week, wife was also sick. Worse wheezing and spastic coughing with deep breathing and when he exhales. No daily smoking, occasional cigars. No fever. Thick sputum, yellow in color today.     Chronic HTN x years. He is on lisinopril 20 mg qd. He denies chest pain, palps, headache or vision changes. Working on weight loss.      The following portions of the patient's history were reviewed and updated as appropriate: allergies, current medications, past family history, past medical history, past social history, past surgical history and problem list.    Review of Systems    Objective   Physical Exam  Vitals reviewed.   Constitutional:       Appearance: Normal appearance. He is obese.   HENT:      Nose: Nose normal.      Mouth/Throat:      Mouth: Mucous membranes are moist.   Eyes:      Pupils: Pupils are equal, round, and reactive to light.   Cardiovascular:      Rate and Rhythm: Normal rate and regular rhythm.      Pulses: Normal pulses.      Heart sounds: Normal heart sounds.   Pulmonary:      Effort: Pulmonary effort is normal.      Breath sounds: Wheezing present. No rhonchi.   Abdominal:      General: Bowel sounds are normal.   Musculoskeletal:         General: Normal range of motion.   Skin:     General: Skin is warm.   Neurological:      General: No focal deficit present.      Mental Status: He is alert.         Vitals:    04/11/23 1339   BP: 122/82   Pulse: 68   Resp: 20   SpO2: 95%     Body mass index is 34.04 kg/m².    Procedures    Assessment & Plan   Problems Addressed this Visit    None  Visit Diagnoses     Bronchitis    -  Primary    Relevant Medications    benzonatate (Tessalon Perles) 100 MG capsule    promethazine-dextromethorphan (PROMETHAZINE-DM) 6.25-15 MG/5ML syrup    Essential hypertension          Diagnoses       Codes Comments    Bronchitis    -   Primary ICD-10-CM: J40  ICD-9-CM: 490     Essential hypertension     ICD-10-CM: I10  ICD-9-CM: 401.9       Bronchitis- rx medrol dose rae as directed, tessalon 100 tid, prometh dm syrup nightly , reviewed meds/e profile may cause sedation, do not drink alcohol or drive w meds   Rest, hydrate, hot tea w honey    HTN- cw lisinopirl 20 mg qd, low na diet, walk daily, continue to work on weight loss         Education provided in AVS   Return in about 6 months (around 10/11/2023) for Annual physical.

## 2023-05-22 ENCOUNTER — OFFICE VISIT (OUTPATIENT)
Dept: FAMILY MEDICINE CLINIC | Facility: CLINIC | Age: 53
End: 2023-05-22
Payer: COMMERCIAL

## 2023-05-22 VITALS
HEIGHT: 72 IN | RESPIRATION RATE: 18 BRPM | SYSTOLIC BLOOD PRESSURE: 132 MMHG | WEIGHT: 260 LBS | HEART RATE: 69 BPM | OXYGEN SATURATION: 97 % | BODY MASS INDEX: 35.21 KG/M2 | DIASTOLIC BLOOD PRESSURE: 102 MMHG

## 2023-05-22 DIAGNOSIS — L23.7 POISON IVY: Primary | ICD-10-CM

## 2023-05-22 RX ORDER — METHYLPREDNISOLONE 4 MG/1
TABLET ORAL
Qty: 1 EACH | Refills: 0 | Status: SHIPPED | OUTPATIENT
Start: 2023-05-22

## 2023-05-22 RX ORDER — TRIAMCINOLONE ACETONIDE 40 MG/ML
40 INJECTION, SUSPENSION INTRA-ARTICULAR; INTRAMUSCULAR ONCE
Status: COMPLETED | OUTPATIENT
Start: 2023-05-22 | End: 2023-05-22

## 2023-05-22 RX ADMIN — TRIAMCINOLONE ACETONIDE 40 MG: 40 INJECTION, SUSPENSION INTRA-ARTICULAR; INTRAMUSCULAR at 11:26

## 2023-05-22 NOTE — PATIENT INSTRUCTIONS
Return if symptoms worsen or fail to improve.   Call with any questions or concerns.   Take medrol dose pack if rash does not clear or improve in 3 days.

## 2023-05-22 NOTE — PROGRESS NOTES
Subjective   Urbano Mares is a 53 y.o. male.     History of Present Illness   Patient presents with c/o poison ivy dermatitis to his neck and arms. He reports that he was working in the yard over the weekend and noticed this on Saturday. He reports that rash is itchy. Patient has used calamine lotion.     The following portions of the patient's history were reviewed and updated as appropriate: allergies, current medications, past family history, past medical history, past social history, past surgical history and problem list.    Review of Systems   Constitutional: Negative for chills, fatigue and fever.   Respiratory: Negative for cough, chest tightness, shortness of breath and wheezing.    Cardiovascular: Negative for chest pain, palpitations and leg swelling.   Musculoskeletal: Negative for arthralgias and joint swelling.   Skin: Positive for rash (arms bilaterally and neck. ). Negative for color change, dry skin, pallor, skin lesions and wound.   Allergic/Immunologic: Negative.    Neurological: Negative for dizziness, weakness and headache.       Objective   Physical Exam  Vitals and nursing note reviewed.   Constitutional:       Appearance: He is well-developed.   HENT:      Head: Normocephalic and atraumatic.   Eyes:      Conjunctiva/sclera: Conjunctivae normal.      Pupils: Pupils are equal, round, and reactive to light.   Cardiovascular:      Rate and Rhythm: Normal rate and regular rhythm.      Heart sounds: Normal heart sounds. No murmur heard.  Pulmonary:      Effort: Pulmonary effort is normal.      Breath sounds: Normal breath sounds.   Musculoskeletal:         General: No deformity.      Cervical back: Normal range of motion and neck supple.   Lymphadenopathy:      Cervical: No cervical adenopathy.   Skin:     General: Skin is warm and dry.      Findings: Erythema and rash present. No lesion.      Comments: Raised erythematous rash to left neck and forearms bilaterally some blisters noted.     Neurological:      Mental Status: He is alert and oriented to person, place, and time.   Psychiatric:         Behavior: Behavior normal.         Thought Content: Thought content normal.         Judgment: Judgment normal.         Vitals:    05/22/23 1024   BP: (!) 132/102   Pulse: 69   Resp: 18   SpO2: 97%     Body mass index is 35.26 kg/m².      Procedures    Assessment & Plan   Problems Addressed this Visit    None  Visit Diagnoses     Poison ivy    -  Primary    Relevant Medications    triamcinolone acetonide (KENALOG-40) injection 40 mg    methylPREDNISolone (MEDROL) 4 MG dose pack      Diagnoses       Codes Comments    Poison ivy    -  Primary ICD-10-CM: L23.7  ICD-9-CM: 692.6           Triamcinolone injection 40mg IM X 1 dose  Medrol dose pack as needed if rash does not clear with shot in three days.         Return if symptoms worsen or fail to improve.

## 2023-10-17 ENCOUNTER — OFFICE VISIT (OUTPATIENT)
Dept: FAMILY MEDICINE CLINIC | Facility: CLINIC | Age: 53
End: 2023-10-17
Payer: COMMERCIAL

## 2023-10-17 VITALS
HEIGHT: 72 IN | BODY MASS INDEX: 32.7 KG/M2 | HEART RATE: 61 BPM | RESPIRATION RATE: 18 BRPM | DIASTOLIC BLOOD PRESSURE: 90 MMHG | WEIGHT: 241.4 LBS | SYSTOLIC BLOOD PRESSURE: 180 MMHG | OXYGEN SATURATION: 95 %

## 2023-10-17 DIAGNOSIS — L23.7 POISON IVY: ICD-10-CM

## 2023-10-17 DIAGNOSIS — Z12.5 PROSTATE CANCER SCREENING: ICD-10-CM

## 2023-10-17 DIAGNOSIS — I10 HYPERTENSION, UNSPECIFIED TYPE: ICD-10-CM

## 2023-10-17 DIAGNOSIS — Z00.00 ENCOUNTER FOR PREVENTATIVE ADULT HEALTH CARE EXAMINATION: Primary | ICD-10-CM

## 2023-10-17 DIAGNOSIS — G62.9 PERIPHERAL POLYNEUROPATHY: ICD-10-CM

## 2023-10-17 PROBLEM — E66.811 CLASS 1 OBESITY DUE TO EXCESS CALORIES WITHOUT SERIOUS COMORBIDITY WITH BODY MASS INDEX (BMI) OF 32.0 TO 32.9 IN ADULT: Status: ACTIVE | Noted: 2023-10-17

## 2023-10-17 PROBLEM — E66.09 CLASS 1 OBESITY DUE TO EXCESS CALORIES WITHOUT SERIOUS COMORBIDITY WITH BODY MASS INDEX (BMI) OF 32.0 TO 32.9 IN ADULT: Status: ACTIVE | Noted: 2023-10-17

## 2023-10-17 RX ORDER — LISINOPRIL 20 MG/1
20 TABLET ORAL DAILY
Qty: 90 TABLET | Refills: 2 | Status: SHIPPED | OUTPATIENT
Start: 2023-10-17

## 2023-10-17 RX ORDER — METHYLPREDNISOLONE 4 MG/1
TABLET ORAL
Qty: 1 EACH | Refills: 0 | Status: SHIPPED | OUTPATIENT
Start: 2023-10-17

## 2023-10-17 NOTE — PROGRESS NOTES
Subjective   Urbano Mares is a 53 y.o. male.     History of Present Illness   Estab patient here for annual exam , labs and updated screening/vaccines    Chronic Hypertension x years. He ran out of BP meds and did not refill, hoping BP would stay low. He has lost weight and was hoping to stay off meds. Today /90.  Patient reports he has been eating more takeout than usual and under stress getting his daughter's condo set up.  He denies chest pain, palpitations, headaches, ringing in the ears or vision changes.  Patient drinks some coffee daily    BMI 32. He has worked on weight loss.  Patient reports over the last few weeks his diet has not been good he has been eating a lot of convenience foods.    Acute alternating hot/cold burning feet.  Patient has never had diabetes in the past.  He reports he has been treating his feet for athletes foot.  Patient has never been diagnosed with neuropathy in the past.  He reports he is sleeping well and this feeling is not interrupting his sleep.  It is intermittent.  No temperature or color change to feet    Acute poison ivy.  Rash to neck, face, axilla, bilateral arms.  He denies wheezing or shortness of breath.  This is not uncommon for patient.  In the past he has taken oral steroids or required steroid injection.    The following portions of the patient's history were reviewed and updated as appropriate: allergies, current medications, past family history, past medical history, past social history, past surgical history and problem list.    Review of Systems   Constitutional:  Negative for activity change, diaphoresis, fatigue, fever and unexpected weight loss.   HENT:  Negative for congestion.         Dental exam is utd     Eyes:  Negative for visual disturbance.        Readers, eye exam is due      Respiratory:  Negative for cough, shortness of breath and wheezing.    Cardiovascular:  Negative for chest pain, palpitations and leg swelling.   Gastrointestinal:   Negative for abdominal distention, blood in stool, constipation, diarrhea and GERD.   Endocrine: Negative for cold intolerance, heat intolerance, polydipsia, polyphagia and polyuria.   Genitourinary:  Negative for dysuria, erectile dysfunction and urinary incontinence.   Musculoskeletal:  Positive for arthralgias. Negative for back pain.   Skin:  Positive for rash.   Allergic/Immunologic: Negative for environmental allergies.   Neurological:  Positive for numbness. Negative for weakness and headache.   Hematological:  Does not bruise/bleed easily.   Psychiatric/Behavioral:  Negative for sleep disturbance and depressed mood. The patient is not nervous/anxious.        Objective   Physical Exam  Vitals reviewed.   Constitutional:       Appearance: Normal appearance. He is normal weight.   HENT:      Head: Normocephalic.      Right Ear: Tympanic membrane normal.      Left Ear: Tympanic membrane normal.      Nose: Nose normal.      Mouth/Throat:      Mouth: Mucous membranes are moist.   Eyes:      Pupils: Pupils are equal, round, and reactive to light.   Cardiovascular:      Rate and Rhythm: Normal rate and regular rhythm.      Pulses: Normal pulses.           Dorsalis pedis pulses are 2+ on the right side.        Posterior tibial pulses are 2+ on the right side and 2+ on the left side.      Heart sounds: Normal heart sounds.   Pulmonary:      Effort: Pulmonary effort is normal.      Breath sounds: Normal breath sounds.   Abdominal:      General: Abdomen is flat. Bowel sounds are normal.      Palpations: Abdomen is soft.   Musculoskeletal:         General: Normal range of motion.      Cervical back: Normal range of motion.   Feet:      Right foot:      Protective Sensation: 5 sites tested.  3 sites sensed.      Left foot:      Protective Sensation: 5 sites tested.  3 sites sensed.   Skin:     General: Skin is warm.      Findings: Rash present.   Neurological:      General: No focal deficit present.      Mental Status: He  is alert.   Psychiatric:         Mood and Affect: Mood normal.         Vitals:    10/17/23 0945   BP: 180/90   Pulse: 61   Resp: 18   SpO2: 95%     Body mass index is 32.74 kg/m².    Procedures    Assessment & Plan   Problems Addressed this Visit       BP (high blood pressure)    Relevant Medications    lisinopril (PRINIVIL,ZESTRIL) 20 MG tablet    Other Relevant Orders    Comprehensive Metabolic Panel    CBC & Differential    Lipid Panel With / Chol / HDL Ratio    TSH    Hemoglobin A1c     Other Visit Diagnoses       Encounter for preventative adult health care examination    -  Primary    Poison ivy        Relevant Medications    methylPREDNISolone (MEDROL) 4 MG dose pack    Peripheral polyneuropathy        Relevant Orders    Hemoglobin A1c    Vitamin B12    Folate    Prostate cancer screening        Relevant Orders    PSA Screen          Diagnoses         Codes Comments    Encounter for preventative adult health care examination    -  Primary ICD-10-CM: Z00.00  ICD-9-CM: V70.0     Hypertension, unspecified type     ICD-10-CM: I10  ICD-9-CM: 401.9     Poison ivy     ICD-10-CM: L23.7  ICD-9-CM: 692.6     Peripheral polyneuropathy     ICD-10-CM: G62.9  ICD-9-CM: 356.9     Prostate cancer screening     ICD-10-CM: Z12.5  ICD-9-CM: V76.44           Orders Placed This Encounter   Procedures    Fluzone (or Fluarix & Flulaval for VFC) >6 Mos ([id number])    Comprehensive Metabolic Panel     Order Specific Question:   Release to patient     Answer:   Routine Release [[id number]]    Lipid Panel With / Chol / HDL Ratio     Order Specific Question:   Release to patient     Answer:   Routine Release [[id number]]    TSH     Order Specific Question:   Release to patient     Answer:   Routine Release [[id number]]    Hemoglobin A1c     Order Specific Question:   Release to patient     Answer:   Routine Release [[id number]]    Vitamin B12     Order Specific Question:   Release to patient     Answer:   Routine Release [[id number]]     Folate     Order Specific Question:   Release to patient     Answer:   Routine Release [[id number]]    PSA Screen     Order Specific Question:   Release to patient     Answer:   Routine Release [[id number]]    CBC & Differential     Order Specific Question:   Release to patient     Answer:   Routine Release [[id number]]       Preventative care- Follow heart healthy diet, drink water, walk daily. Wear seatbelts, wear helmets, wear sunscreens. Follow CDC guidelines for covid pandemic.     Hypertension-refill lisinopril 20 mg a day.  Patient denies cough or medication side effect.  Encourage patient to not run out of medication.  Follow heart healthy diet, low-sodium diet, walk most days, limit stress and stimulants    Neuropathy of feet-check B12, A1c, encourage patient to keep feet warm and dry, inspect feet daily, may use topical rubs    Poison ivy-Rx Medrol Dosepak, continue with Calmoseptine    Discussed prostate cancer screening, patient reports he has + Paternal history of prostate cancer , denies urinary symptoms, will check PSA, patient is aware we do not do digital exams       Additional time on blood pressure management, neuropathy and chronic illness management greater than 22 minutes  Education provided in AVS   Return in about 6 months (around 4/17/2024) for Recheck.

## 2023-10-18 DIAGNOSIS — G62.9 PERIPHERAL POLYNEUROPATHY: Primary | ICD-10-CM

## 2023-10-18 LAB
ALBUMIN SERPL-MCNC: 4.8 G/DL (ref 3.8–4.9)
ALBUMIN/GLOB SERPL: 2 {RATIO} (ref 1.2–2.2)
ALP SERPL-CCNC: 64 IU/L (ref 44–121)
ALT SERPL-CCNC: 30 IU/L (ref 0–44)
AST SERPL-CCNC: 22 IU/L (ref 0–40)
BASOPHILS # BLD AUTO: 0 X10E3/UL (ref 0–0.2)
BASOPHILS NFR BLD AUTO: 1 %
BILIRUB SERPL-MCNC: 0.6 MG/DL (ref 0–1.2)
BUN SERPL-MCNC: 12 MG/DL (ref 6–24)
BUN/CREAT SERPL: 12 (ref 9–20)
CALCIUM SERPL-MCNC: 9.7 MG/DL (ref 8.7–10.2)
CHLORIDE SERPL-SCNC: 102 MMOL/L (ref 96–106)
CHOLEST SERPL-MCNC: 240 MG/DL (ref 100–199)
CHOLEST/HDLC SERPL: 5.5 RATIO (ref 0–5)
CO2 SERPL-SCNC: 23 MMOL/L (ref 20–29)
CREAT SERPL-MCNC: 0.97 MG/DL (ref 0.76–1.27)
EGFRCR SERPLBLD CKD-EPI 2021: 92 ML/MIN/1.73
EOSINOPHIL # BLD AUTO: 0.1 X10E3/UL (ref 0–0.4)
EOSINOPHIL NFR BLD AUTO: 2 %
ERYTHROCYTE [DISTWIDTH] IN BLOOD BY AUTOMATED COUNT: 12.6 % (ref 11.6–15.4)
FOLATE SERPL-MCNC: 8.8 NG/ML
GLOBULIN SER CALC-MCNC: 2.4 G/DL (ref 1.5–4.5)
GLUCOSE SERPL-MCNC: 115 MG/DL (ref 70–99)
HBA1C MFR BLD: 6.2 % (ref 4.8–5.6)
HCT VFR BLD AUTO: 44.8 % (ref 37.5–51)
HDLC SERPL-MCNC: 44 MG/DL
HGB BLD-MCNC: 15.4 G/DL (ref 13–17.7)
IMM GRANULOCYTES # BLD AUTO: 0 X10E3/UL (ref 0–0.1)
IMM GRANULOCYTES NFR BLD AUTO: 1 %
LDLC SERPL CALC-MCNC: 161 MG/DL (ref 0–99)
LYMPHOCYTES # BLD AUTO: 1.4 X10E3/UL (ref 0.7–3.1)
LYMPHOCYTES NFR BLD AUTO: 21 %
MCH RBC QN AUTO: 30.7 PG (ref 26.6–33)
MCHC RBC AUTO-ENTMCNC: 34.4 G/DL (ref 31.5–35.7)
MCV RBC AUTO: 89 FL (ref 79–97)
MONOCYTES # BLD AUTO: 0.4 X10E3/UL (ref 0.1–0.9)
MONOCYTES NFR BLD AUTO: 6 %
NEUTROPHILS # BLD AUTO: 4.8 X10E3/UL (ref 1.4–7)
NEUTROPHILS NFR BLD AUTO: 69 %
PLATELET # BLD AUTO: 186 X10E3/UL (ref 150–450)
POTASSIUM SERPL-SCNC: 4.7 MMOL/L (ref 3.5–5.2)
PROT SERPL-MCNC: 7.2 G/DL (ref 6–8.5)
PSA SERPL-MCNC: 2.8 NG/ML (ref 0–4)
RBC # BLD AUTO: 5.02 X10E6/UL (ref 4.14–5.8)
SODIUM SERPL-SCNC: 140 MMOL/L (ref 134–144)
TRIGL SERPL-MCNC: 193 MG/DL (ref 0–149)
TSH SERPL DL<=0.005 MIU/L-ACNC: 1.15 UIU/ML (ref 0.45–4.5)
VIT B12 SERPL-MCNC: 536 PG/ML (ref 232–1245)
VLDLC SERPL CALC-MCNC: 35 MG/DL (ref 5–40)
WBC # BLD AUTO: 6.8 X10E3/UL (ref 3.4–10.8)

## 2024-04-17 ENCOUNTER — OFFICE VISIT (OUTPATIENT)
Dept: FAMILY MEDICINE CLINIC | Facility: CLINIC | Age: 54
End: 2024-04-17
Payer: COMMERCIAL

## 2024-04-17 VITALS
WEIGHT: 257 LBS | SYSTOLIC BLOOD PRESSURE: 130 MMHG | OXYGEN SATURATION: 98 % | RESPIRATION RATE: 18 BRPM | HEART RATE: 59 BPM | BODY MASS INDEX: 34.81 KG/M2 | DIASTOLIC BLOOD PRESSURE: 98 MMHG | HEIGHT: 72 IN

## 2024-04-17 DIAGNOSIS — J30.2 SEASONAL ALLERGIES: ICD-10-CM

## 2024-04-17 DIAGNOSIS — R73.03 PREDIABETES: ICD-10-CM

## 2024-04-17 DIAGNOSIS — I10 PRIMARY HYPERTENSION: Primary | ICD-10-CM

## 2024-04-17 PROCEDURE — 99214 OFFICE O/P EST MOD 30 MIN: CPT | Performed by: NURSE PRACTITIONER

## 2024-04-17 RX ORDER — LISINOPRIL 40 MG/1
40 TABLET ORAL DAILY
Qty: 90 TABLET | Refills: 3 | Status: SHIPPED | OUTPATIENT
Start: 2024-04-17 | End: 2024-07-16

## 2024-04-17 NOTE — PROGRESS NOTES
Subjective   Urbano Mares is a 54 y.o. male.     History of Present Illness     Estab pt here for hypertension x years. History of prediabetes , last A1c 6.2. He denies chest pain, palps, headache or vision changes. Occasional SOA w exertion (happened a couple weeks ago after bronchitis and carrying heavy rocks and loading rocks in and out had coughing and SOA). He does not drink alcohol daily. He drinks more on the weekends. Does report some snoring.    Chronic seasonal allergies , worse w recent bronchitis. Symptoms have improved, no longer coughing. Denies SOA or pain w  deep breathing, no fever. Uses zyrtec as needed.     The following portions of the patient's history were reviewed and updated as appropriate: allergies, current medications, past family history, past medical history, past social history, past surgical history and problem list.    Review of Systems    Objective   Physical Exam  Vitals reviewed.   Constitutional:       Appearance: Normal appearance. He is obese.   HENT:      Mouth/Throat:      Mouth: Mucous membranes are moist.   Cardiovascular:      Rate and Rhythm: Normal rate and regular rhythm.      Pulses: Normal pulses.      Heart sounds: Normal heart sounds.   Pulmonary:      Effort: Pulmonary effort is normal.      Breath sounds: Normal breath sounds.   Abdominal:      General: Bowel sounds are normal.   Musculoskeletal:      Right lower leg: No edema.      Left lower leg: No edema.   Skin:     General: Skin is warm.   Neurological:      General: No focal deficit present.      Mental Status: He is alert.         Vitals:    04/17/24 1043   BP: 130/98   Pulse: 59   Resp: 18   SpO2: 98%     Body mass index is 34.86 kg/m².      Procedures    Assessment & Plan   Problems Addressed this Visit       BP (high blood pressure) - Primary    Relevant Medications    lisinopril (PRINIVIL,ZESTRIL) 40 MG tablet     Other Visit Diagnoses       Seasonal allergies              Diagnoses         Codes  Comments    Primary hypertension    -  Primary ICD-10-CM: I10  ICD-9-CM: 401.9     Prediabetes     ICD-10-CM: R73.03  ICD-9-CM: 790.29     Seasonal allergies     ICD-10-CM: J30.2  ICD-9-CM: 477.9           HTN- goal 130/80, increase lisinopril 40 mg qd, check BP at home, walk , limit stress, work on quality sleep. Working on diet, weight loss. Consider sleep study if no better and no weight loss. Sleep on wedge, use breathe right strips, flonase as needed    Prediabetes- limit sweets, junk, alcohol, not interested in GLP 1 meds, drink water, more fiber    Seasonal allergies - cw zyrtec and flonase as needed. Saline as needed.              Return in about 6 months (around 10/17/2024) for Annual physical.

## 2024-08-08 ENCOUNTER — OFFICE VISIT (OUTPATIENT)
Dept: FAMILY MEDICINE CLINIC | Facility: CLINIC | Age: 54
End: 2024-08-08
Payer: COMMERCIAL

## 2024-08-08 VITALS
SYSTOLIC BLOOD PRESSURE: 138 MMHG | BODY MASS INDEX: 33.72 KG/M2 | RESPIRATION RATE: 18 BRPM | HEIGHT: 72 IN | OXYGEN SATURATION: 98 % | HEART RATE: 76 BPM | WEIGHT: 249 LBS | DIASTOLIC BLOOD PRESSURE: 92 MMHG

## 2024-08-08 DIAGNOSIS — L23.7 POISON IVY DERMATITIS: Primary | ICD-10-CM

## 2024-08-08 DIAGNOSIS — I10 PRIMARY HYPERTENSION: ICD-10-CM

## 2024-08-08 PROCEDURE — 99213 OFFICE O/P EST LOW 20 MIN: CPT | Performed by: NURSE PRACTITIONER

## 2024-08-08 RX ORDER — FLUTICASONE PROPIONATE 50 MCG
1 SPRAY, SUSPENSION (ML) NASAL DAILY
COMMUNITY
Start: 2024-04-03 | End: 2024-08-08

## 2024-08-08 RX ORDER — CETIRIZINE HYDROCHLORIDE 10 MG/1
10 TABLET ORAL DAILY
COMMUNITY
Start: 2024-04-03 | End: 2024-08-08

## 2024-08-08 RX ORDER — LISINOPRIL 40 MG/1
40 TABLET ORAL DAILY
Qty: 90 TABLET | Refills: 3 | Status: SHIPPED | OUTPATIENT
Start: 2024-08-08 | End: 2024-11-06

## 2024-08-08 RX ORDER — PREDNISONE 20 MG/1
TABLET ORAL
Qty: 17 TABLET | Refills: 1 | Status: SHIPPED | OUTPATIENT
Start: 2024-08-08

## 2024-08-08 NOTE — PROGRESS NOTES
Subjective   Urbano Mares is a 54 y.o. male.     History of Present Illness   Estab pt  here for rash    Acute rash. He has history of poison ivy dermatitis , started noticing rash 5 days ago. He is  at [facility]. He has been using benadryl and calamine lotions, soaking in epsom salts. He usually taking oral steroid taper for outbreaks. No wheezing or SOA.     Chronic hypertension x years. On lisinopril 40 mg qd. He is feeling well overall. BP mildly elevated today w rash and not sleeping. No chest pain, palps, headaches. No coughing or side effects.     The following portions of the patient's history were reviewed and updated as appropriate: allergies, current medications, past family history, past medical history, past social history, past surgical history and problem list.    Review of Systems      Current Outpatient Medications:     lisinopril (PRINIVIL,ZESTRIL) 40 MG tablet, Take 1 tablet by mouth Daily for 90 days., Disp: 90 tablet, Rfl: 3    predniSONE (DELTASONE) 20 MG tablet, 3 tabs daily for 3 days, then 2 tabs daily for 3 days then half tab for four days, Disp: 17 tablet, Rfl: 1    Objective   Physical Exam  Vitals reviewed.   Constitutional:       Appearance: Normal appearance.   Cardiovascular:      Rate and Rhythm: Normal rate and regular rhythm.      Pulses: Normal pulses.      Heart sounds: Normal heart sounds.   Pulmonary:      Effort: Pulmonary effort is normal.      Breath sounds: Normal breath sounds.   Skin:     Findings: Rash present.             Comments: Rasied pink rashes throughout   Neurological:      Mental Status: He is alert.         Vitals:    08/08/24 1031   BP: 138/92   Pulse: 76   Resp: 18   SpO2: 98%     Body mass index is 33.77 kg/m².    Procedures    TSH   Date Value Ref Range Status   10/17/2023 1.150 0.450 - 4.500 uIU/mL Final     Hemoglobin A1C   Date Value Ref Range Status   10/17/2023 6.2 (H) 4.8 - 5.6 % Final     Comment:              Prediabetes: 5.7 - 6.4            Diabetes: >6.4           Glycemic control for adults with diabetes: <7.0                     Assessment & Plan   Problems Addressed this Visit       BP (high blood pressure)    Relevant Medications    lisinopril (PRINIVIL,ZESTRIL) 40 MG tablet     Other Visit Diagnoses       Poison ivy dermatitis    -  Primary          Diagnoses         Codes Comments    Poison ivy dermatitis    -  Primary ICD-10-CM: L23.7  ICD-9-CM: 692.6     Primary hypertension     ICD-10-CM: I10  ICD-9-CM: 401.9           Poison ivy- rx pred taper 60 x 3 days, 40 x 3 days 10 x 4 days  Use calmoseptine, benadryl or zyrtec and pepcid as needed   If SOA or wheezing or worsening severely go to ER     HTN- refill lisinopril 40 qd, enc low na diet, exercise     Education provided in AVS   Return if symptoms worsen or fail to improve.

## 2024-10-21 ENCOUNTER — OFFICE VISIT (OUTPATIENT)
Dept: FAMILY MEDICINE CLINIC | Facility: CLINIC | Age: 54
End: 2024-10-21
Payer: COMMERCIAL

## 2024-10-21 VITALS
DIASTOLIC BLOOD PRESSURE: 98 MMHG | OXYGEN SATURATION: 93 % | HEIGHT: 72 IN | WEIGHT: 258 LBS | HEART RATE: 57 BPM | BODY MASS INDEX: 34.95 KG/M2 | RESPIRATION RATE: 18 BRPM | SYSTOLIC BLOOD PRESSURE: 138 MMHG

## 2024-10-21 DIAGNOSIS — I10 PRIMARY HYPERTENSION: ICD-10-CM

## 2024-10-21 DIAGNOSIS — D22.9 CHANGE IN COLOR OF SKIN MOLE: ICD-10-CM

## 2024-10-21 DIAGNOSIS — E66.811 CLASS 1 OBESITY DUE TO EXCESS CALORIES WITH SERIOUS COMORBIDITY AND BODY MASS INDEX (BMI) OF 34.0 TO 34.9 IN ADULT: ICD-10-CM

## 2024-10-21 DIAGNOSIS — R06.83 SNORING: ICD-10-CM

## 2024-10-21 DIAGNOSIS — G62.9 PERIPHERAL POLYNEUROPATHY: ICD-10-CM

## 2024-10-21 DIAGNOSIS — Z00.00 ANNUAL PHYSICAL EXAM: Primary | ICD-10-CM

## 2024-10-21 DIAGNOSIS — Z12.5 SCREENING FOR MALIGNANT NEOPLASM OF PROSTATE: ICD-10-CM

## 2024-10-21 DIAGNOSIS — R53.82 CHRONIC FATIGUE: ICD-10-CM

## 2024-10-21 DIAGNOSIS — E66.09 CLASS 1 OBESITY DUE TO EXCESS CALORIES WITH SERIOUS COMORBIDITY AND BODY MASS INDEX (BMI) OF 34.0 TO 34.9 IN ADULT: ICD-10-CM

## 2024-10-21 LAB
BASOPHILS # BLD AUTO: 0.05 10*3/MM3 (ref 0–0.2)
BASOPHILS NFR BLD AUTO: 0.7 % (ref 0–1.5)
EOSINOPHIL # BLD AUTO: 0.12 10*3/MM3 (ref 0–0.4)
EOSINOPHIL NFR BLD AUTO: 1.7 % (ref 0.3–6.2)
ERYTHROCYTE [DISTWIDTH] IN BLOOD BY AUTOMATED COUNT: 12.8 % (ref 12.3–15.4)
HCT VFR BLD AUTO: 45.1 % (ref 37.5–51)
HGB BLD-MCNC: 15.6 G/DL (ref 13–17.7)
IMM GRANULOCYTES # BLD AUTO: 0.05 10*3/MM3 (ref 0–0.05)
IMM GRANULOCYTES NFR BLD AUTO: 0.7 % (ref 0–0.5)
LYMPHOCYTES # BLD AUTO: 1.52 10*3/MM3 (ref 0.7–3.1)
LYMPHOCYTES NFR BLD AUTO: 21.3 % (ref 19.6–45.3)
MCH RBC QN AUTO: 31.7 PG (ref 26.6–33)
MCHC RBC AUTO-ENTMCNC: 34.6 G/DL (ref 31.5–35.7)
MCV RBC AUTO: 91.7 FL (ref 79–97)
MONOCYTES # BLD AUTO: 0.55 10*3/MM3 (ref 0.1–0.9)
MONOCYTES NFR BLD AUTO: 7.7 % (ref 5–12)
NEUTROPHILS # BLD AUTO: 4.84 10*3/MM3 (ref 1.7–7)
NEUTROPHILS NFR BLD AUTO: 67.9 % (ref 42.7–76)
NRBC BLD AUTO-RTO: 0 /100 WBC (ref 0–0.2)
PLATELET # BLD AUTO: 202 10*3/MM3 (ref 140–450)
RBC # BLD AUTO: 4.92 10*6/MM3 (ref 4.14–5.8)
WBC # BLD AUTO: 7.13 10*3/MM3 (ref 3.4–10.8)

## 2024-10-21 PROCEDURE — 99396 PREV VISIT EST AGE 40-64: CPT | Performed by: NURSE PRACTITIONER

## 2024-10-21 RX ORDER — LISINOPRIL 40 MG/1
40 TABLET ORAL DAILY
Qty: 90 TABLET | Refills: 3 | Status: SHIPPED | OUTPATIENT
Start: 2024-10-21 | End: 2025-01-19

## 2024-10-21 NOTE — ASSESSMENT & PLAN NOTE
Patient's (Body mass index is 34.99 kg/m².) indicates that they are obese (BMI >30) with health conditions that include hypertension and dyslipidemias . Weight is unchanged. BMI  is above average; BMI management plan is completed. We discussed portion control, increasing exercise, joining a fitness center or start home based exercise program, an ezequiel-based approach such as [employer] Pal or Lose It, and Information on healthy weight added to patient's after visit summary.

## 2024-10-21 NOTE — PROGRESS NOTES
Subjective   Urbano Mares is a 54 y.o. male.   Patient here for annual physical exam, labs, chronic illness management and updated screening.      History of Present Illness   Chronic hypertension x years. Managed on lisinopril 40 mg. No cough, no side effects. No chest pain, palps, headaches or vision changes.     Chronic obesity x years. BMI 34. 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 He does not do formal exercise or gym/weights. Chronic hyperlipidemia. He is occasional cigar smoker. He drinks occasional alcohol, not daily.     The 10-year ASCVD risk score (Yohnanes GAMBOA, et al., 2019) is: 19.3%    Values used to calculate the score:      Age: 54 years      Sex: Male      Is Non- : No      Diabetic: No      Tobacco smoker: Yes      Systolic Blood Pressure: 138 mmHg      Is BP treated: Yes      HDL Cholesterol: 44 mg/dL      Total Cholesterol: 240 mg/dL    Chronic fatigue and snoring, He is open to sleep apnea testing. BMI 34. He is more tired lately. He is wanting testosterone checked.     The following portions of the patient's history were reviewed and updated as appropriate: allergies, current medications, past family history, past medical history, past social history, past surgical history and problem list.    Review of Systems   Constitutional:  Negative for activity change and unexpected weight loss.   HENT:  Negative for congestion.    Eyes:  Negative for visual disturbance.   Respiratory:  Negative for cough and shortness of breath.    Cardiovascular:  Negative for chest pain, palpitations and leg swelling.   Gastrointestinal:  Negative for abdominal distention, constipation, diarrhea and GERD.   Endocrine: Negative for cold intolerance and heat intolerance.   Genitourinary:  Negative for urinary incontinence.   Neurological:  Negative for weakness.   Hematological:  Does not bruise/bleed easily.   Psychiatric/Behavioral:  Negative for sleep disturbance and  depressed mood.          Current Outpatient Medications:     lisinopril (PRINIVIL,ZESTRIL) 40 MG tablet, Take 1 tablet by mouth Daily for 90 days., Disp: 90 tablet, Rfl: 3    Objective   Physical Exam  Vitals reviewed.   Constitutional:       Appearance: Normal appearance. He is obese.   HENT:      Head: Normocephalic.      Right Ear: Tympanic membrane normal.      Left Ear: Tympanic membrane normal.      Nose: Nose normal.      Mouth/Throat:      Mouth: Mucous membranes are moist.   Eyes:      Pupils: Pupils are equal, round, and reactive to light.   Cardiovascular:      Rate and Rhythm: Normal rate and regular rhythm.      Pulses: Normal pulses.      Heart sounds: Normal heart sounds.   Pulmonary:      Effort: Pulmonary effort is normal.      Breath sounds: Normal breath sounds.   Abdominal:      General: Bowel sounds are normal.      Palpations: Abdomen is soft.   Musculoskeletal:         General: Normal range of motion.      Cervical back: Normal range of motion.   Skin:     General: Skin is warm.   Neurological:      General: No focal deficit present.      Mental Status: He is alert.   Psychiatric:         Mood and Affect: Mood normal.         Vitals:    10/21/24 0856   BP: 138/98   Pulse: 57   Resp: 18   SpO2: 93%     Body mass index is 34.99 kg/m².    Procedures    TSH   Date Value Ref Range Status   10/17/2023 1.150 0.450 - 4.500 uIU/mL Final     Hemoglobin A1C   Date Value Ref Range Status   10/17/2023 6.2 (H) 4.8 - 5.6 % Final     Comment:              Prediabetes: 5.7 - 6.4           Diabetes: >6.4           Glycemic control for adults with diabetes: <7.0                     Assessment & Plan   Problems Addressed this Visit       BP (high blood pressure)    Relevant Medications    lisinopril (PRINIVIL,ZESTRIL) 40 MG tablet    Class 1 obesity due to excess calories with serious comorbidity and body mass index (BMI) of 34.0 to 34.9 in adult     Patient's (Body mass index is 34.99 kg/m².) indicates that they  are obese (BMI >30) with health conditions that include hypertension and dyslipidemias . Weight is unchanged. BMI  is above average; BMI management plan is completed. We discussed portion control, increasing exercise, joining a fitness center or start home based exercise program, an ezequiel-based approach such as [employer] Pal or Lose It, and Information on healthy weight added to patient's after visit summary.          Relevant Orders    Ambulatory Referral to Sleep Medicine     Other Visit Diagnoses       Annual physical exam    -  Primary    Relevant Orders    Comprehensive Metabolic Panel    CBC & Differential    Lipid Panel With / Chol / HDL Ratio    Snoring        Relevant Orders    Ambulatory Referral to Sleep Medicine    Chronic fatigue        Relevant Orders    Testosterone, Free, Total    Ambulatory Referral to Sleep Medicine    Peripheral polyneuropathy        Relevant Orders    Hemoglobin A1c    Vitamin B12    Folate    Screening for malignant neoplasm of prostate        Relevant Orders    PSA Screen    Change in color of skin mole        Relevant Orders    Ambulatory Referral to Dermatology          Diagnoses         Codes Comments    Annual physical exam    -  Primary ICD-10-CM: Z00.00  ICD-9-CM: V70.0     Primary hypertension     ICD-10-CM: I10  ICD-9-CM: 401.9     Snoring     ICD-10-CM: R06.83  ICD-9-CM: 786.09     Class 1 obesity due to excess calories with serious comorbidity and body mass index (BMI) of 34.0 to 34.9 in adult     ICD-10-CM: E66.811, E66.09, Z68.34  ICD-9-CM: 278.00, V85.34     Chronic fatigue     ICD-10-CM: R53.82  ICD-9-CM: 780.79     Peripheral polyneuropathy     ICD-10-CM: G62.9  ICD-9-CM: 356.9     Screening for malignant neoplasm of prostate     ICD-10-CM: Z12.5  ICD-9-CM: V76.44     Change in color of skin mole     ICD-10-CM: D22.9  ICD-9-CM: 709.00           Orders Placed This Encounter   Procedures    Comprehensive Metabolic Panel     Order Specific Question:   Release to  patient     Answer:   Routine Release [1400000002]    Lipid Panel With / Chol / HDL Ratio     Order Specific Question:   Release to patient     Answer:   Routine Release [1400000002]    Hemoglobin A1c     Order Specific Question:   Release to patient     Answer:   Routine Release [1400000002]    Vitamin B12     Order Specific Question:   Release to patient     Answer:   Routine Release [1400000002]    Folate     Order Specific Question:   Release to patient     Answer:   Routine Release [1400000002]    PSA Screen     Order Specific Question:   Release to patient     Answer:   Routine Release [1400000002]    Testosterone, Free, Total     Order Specific Question:   Release to patient     Answer:   Routine Release [1400000002]    Ambulatory Referral to Sleep Medicine     Referral Priority:   Routine     Referral Type:   Consultation     Number of Visits Requested:   1    Ambulatory Referral to Dermatology     Referral Priority:   Routine     Referral Type:   Consultation     Referral Reason:   Specialty Services Required     Requested Specialty:   Dermatology     Number of Visits Requested:   1    CBC & Differential     Order Specific Question:   Release to patient     Answer:   Routine Release [1400000002]           Preventative care- Follow heart healthy diet, drink water, walk daily. Wear seatbelts, wear helmets, wear sunscreens. Follow CDC guidelines for covid and flu .     Htn- cw meds, check labs, enc exercise 150 min.week, follow heart healthy diet    Snoring/fatigue- refer sleep study, enc wt loss, try mouthguard, wedge, breath right strips.     Neuropathy- check labs , enc heart healthy diet, limit sweets and highly refined carbs     PSA screen- reviewed risk vs benefit     Requests DERM referral, enc sunscreen       Education provided in AVS   Return in about 1 year (around 10/21/2025) for Annual physical.

## 2024-10-22 DIAGNOSIS — R79.89 LOW TESTOSTERONE IN MALE: Primary | ICD-10-CM

## 2024-10-22 LAB
ALBUMIN SERPL-MCNC: 4.4 G/DL (ref 3.5–5.2)
ALBUMIN/GLOB SERPL: 1.8 G/DL
ALP SERPL-CCNC: 62 U/L (ref 39–117)
ALT SERPL-CCNC: 45 U/L (ref 1–41)
AST SERPL-CCNC: 28 U/L (ref 1–40)
BILIRUB SERPL-MCNC: 0.6 MG/DL (ref 0–1.2)
BUN SERPL-MCNC: 14 MG/DL (ref 6–20)
BUN/CREAT SERPL: 15.7 (ref 7–25)
CALCIUM SERPL-MCNC: 9.7 MG/DL (ref 8.6–10.5)
CHLORIDE SERPL-SCNC: 103 MMOL/L (ref 98–107)
CHOLEST SERPL-MCNC: 243 MG/DL (ref 0–200)
CHOLEST/HDLC SERPL: 6.23 {RATIO}
CO2 SERPL-SCNC: 24.6 MMOL/L (ref 22–29)
CREAT SERPL-MCNC: 0.89 MG/DL (ref 0.76–1.27)
EGFRCR SERPLBLD CKD-EPI 2021: 100.6 ML/MIN/1.73
FOLATE SERPL-MCNC: 12.9 NG/ML (ref 4.78–24.2)
GLOBULIN SER CALC-MCNC: 2.4 GM/DL
GLUCOSE SERPL-MCNC: 118 MG/DL (ref 65–99)
HBA1C MFR BLD: 6.5 % (ref 4.8–5.6)
HDLC SERPL-MCNC: 39 MG/DL (ref 40–60)
LDLC SERPL CALC-MCNC: 154 MG/DL (ref 0–100)
POTASSIUM SERPL-SCNC: 4.5 MMOL/L (ref 3.5–5.2)
PROT SERPL-MCNC: 6.8 G/DL (ref 6–8.5)
PSA SERPL-MCNC: 2.47 NG/ML (ref 0–4)
SODIUM SERPL-SCNC: 140 MMOL/L (ref 136–145)
TESTOST FREE SERPL-MCNC: 3.7 PG/ML (ref 7.2–24)
TESTOST SERPL-MCNC: 130 NG/DL (ref 264–916)
TRIGL SERPL-MCNC: 269 MG/DL (ref 0–150)
VIT B12 SERPL-MCNC: 506 PG/ML (ref 211–946)
VLDLC SERPL CALC-MCNC: 50 MG/DL (ref 5–40)

## 2024-10-22 RX ORDER — ATORVASTATIN CALCIUM 10 MG/1
10 TABLET, FILM COATED ORAL NIGHTLY
Qty: 30 TABLET | Refills: 3 | Status: SHIPPED | OUTPATIENT
Start: 2024-10-22

## 2025-01-16 RX ORDER — ATORVASTATIN CALCIUM 10 MG/1
10 TABLET, FILM COATED ORAL NIGHTLY
Qty: 30 TABLET | Refills: 3 | Status: SHIPPED | OUTPATIENT
Start: 2025-01-16

## 2025-01-23 ENCOUNTER — OFFICE VISIT (OUTPATIENT)
Dept: FAMILY MEDICINE CLINIC | Facility: CLINIC | Age: 55
End: 2025-01-23
Payer: COMMERCIAL

## 2025-01-23 VITALS
OXYGEN SATURATION: 99 % | SYSTOLIC BLOOD PRESSURE: 130 MMHG | DIASTOLIC BLOOD PRESSURE: 90 MMHG | HEART RATE: 54 BPM | WEIGHT: 240 LBS | HEIGHT: 72 IN | BODY MASS INDEX: 32.51 KG/M2 | RESPIRATION RATE: 18 BRPM

## 2025-01-23 DIAGNOSIS — R73.09 ELEVATED GLUCOSE: ICD-10-CM

## 2025-01-23 DIAGNOSIS — E78.2 MIXED HYPERLIPIDEMIA: ICD-10-CM

## 2025-01-23 DIAGNOSIS — I10 PRIMARY HYPERTENSION: Primary | ICD-10-CM

## 2025-01-23 LAB
ALBUMIN SERPL-MCNC: 4.8 G/DL (ref 3.5–5.2)
ALBUMIN/GLOB SERPL: 2.1 G/DL
ALP SERPL-CCNC: 59 U/L (ref 39–117)
ALT SERPL-CCNC: 20 U/L (ref 1–41)
AST SERPL-CCNC: 15 U/L (ref 1–40)
BILIRUB SERPL-MCNC: 0.7 MG/DL (ref 0–1.2)
BUN SERPL-MCNC: 18 MG/DL (ref 6–20)
BUN/CREAT SERPL: 20.5 (ref 7–25)
CALCIUM SERPL-MCNC: 10.3 MG/DL (ref 8.6–10.5)
CHLORIDE SERPL-SCNC: 99 MMOL/L (ref 98–107)
CO2 SERPL-SCNC: 26.1 MMOL/L (ref 22–29)
CREAT SERPL-MCNC: 0.88 MG/DL (ref 0.76–1.27)
EGFRCR SERPLBLD CKD-EPI 2021: 100.9 ML/MIN/1.73
GLOBULIN SER CALC-MCNC: 2.3 GM/DL
GLUCOSE SERPL-MCNC: 109 MG/DL (ref 65–99)
POTASSIUM SERPL-SCNC: 4.7 MMOL/L (ref 3.5–5.2)
PROT SERPL-MCNC: 7.1 G/DL (ref 6–8.5)
SODIUM SERPL-SCNC: 138 MMOL/L (ref 136–145)

## 2025-01-23 PROCEDURE — 99213 OFFICE O/P EST LOW 20 MIN: CPT | Performed by: NURSE PRACTITIONER

## 2025-01-23 RX ORDER — LISINOPRIL 40 MG/1
40 TABLET ORAL DAILY
Qty: 90 TABLET | Refills: 3 | Status: SHIPPED | OUTPATIENT
Start: 2025-01-23 | End: 2025-01-23 | Stop reason: SDUPTHER

## 2025-01-23 RX ORDER — ATORVASTATIN CALCIUM 10 MG/1
10 TABLET, FILM COATED ORAL NIGHTLY
Qty: 90 TABLET | Refills: 3 | Status: SHIPPED | OUTPATIENT
Start: 2025-01-23

## 2025-01-23 RX ORDER — LISINOPRIL 40 MG/1
40 TABLET ORAL DAILY
Qty: 90 TABLET | Refills: 3 | Status: SHIPPED | OUTPATIENT
Start: 2025-01-23 | End: 2025-04-23

## 2025-01-23 NOTE — PROGRESS NOTES
Subjective   Urbano Mares is a 54 y.o. male.     History of Present Illness     The following portions of the patient's history were reviewed and updated as appropriate: allergies, current medications, past family history, past medical history, past social history, past surgical history and problem list.       The patient is a 54-year-old male who presents for a 3-month follow-up.    He reports an overall improvement in his health status, with increased energy levels and alertness. He has made significant dietary modifications, including the elimination of sweets and the incorporation of protein-rich foods and vegetables. He has abstained from consuming cereal and ice cream for the past 3 months. Despite these changes, he does not engage in regular exercise. His sleep pattern is normal, and he feels well-rested upon awakening. His bowel movements are regular. He has experienced weight loss over the past 3 months, necessitating the adjustment of his belt.    Elevated A1c 6.5  3 months ago. He has been adhering to the prescribed regimen of metformin, administered twice daily, without experiencing any gastrointestinal disturbances such as diarrhea or abdominal pain. He reports no alterations in his urinary function. He recalls a period of visual disturbances several months ago, but no recent changes in his vision have been noted. However, he acknowledges that he has not had an ophthalmological evaluation in approximately 3 to 4 years.    Chronic hypertension x years. He is currently on a daily dose of lisinopril 40 mg, which he tolerates well without any associated cough. No chest pain, palps, headaches or vision changes.     Chronic hyperlipidemia. He was started on atorvastatin 10 mg. He is not experiencing any cramping or muscle pain.    SOCIAL HISTORY  He does not drink alcohol.    MEDICATIONS  Metformin, atorvastatin, lisinopril         Review of Systems        Current Outpatient Medications:     atorvastatin  (LIPITOR) 10 MG tablet, Take 1 tablet by mouth Every Night., Disp: 90 tablet, Rfl: 3    lisinopril (PRINIVIL,ZESTRIL) 40 MG tablet, Take 1 tablet by mouth Daily for 90 days., Disp: 90 tablet, Rfl: 3    metFORMIN (GLUCOPHAGE) 500 MG tablet, Take 1 tablet by mouth 2 (Two) Times a Day With Meals., Disp: 180 tablet, Rfl: 3    Objective   Physical Exam  Constitutional:       Appearance: Normal appearance. He is obese.   HENT:      Mouth/Throat:      Mouth: Mucous membranes are moist.   Cardiovascular:      Rate and Rhythm: Normal rate and regular rhythm.      Pulses: Normal pulses.      Heart sounds: Normal heart sounds.   Pulmonary:      Effort: Pulmonary effort is normal.      Breath sounds: Normal breath sounds.   Musculoskeletal:         General: Normal range of motion.   Skin:     General: Skin is warm.   Neurological:      General: No focal deficit present.      Mental Status: He is alert.         Vitals:    01/23/25 0915   BP: 130/90   Pulse: 54   Resp: 18   SpO2: 99%     Body mass index is 32.55 kg/m².    Procedures    TSH   Date Value Ref Range Status   10/17/2023 1.150 0.450 - 4.500 uIU/mL Final     Hemoglobin A1C   Date Value Ref Range Status   01/23/2025 5.80 (H) 4.80 - 5.60 % Final     Comment:     Hemoglobin A1C Ranges:  Increased Risk for Diabetes  5.7% to 6.4%  Diabetes                     >= 6.5%  Diabetic Goal                < 7.0%              Over the past 2 weeks, how often have you been bothered by any of the following problems?  Little interest or pleasure in doing things: Not at all  Feeling down, depressed, or hopeless: Not at all      Assessment & Plan   Problems Addressed this Visit       BP (high blood pressure) - Primary    Relevant Medications    lisinopril (PRINIVIL,ZESTRIL) 40 MG tablet    Other Relevant Orders    Comprehensive metabolic panel (Completed)    Hemoglobin A1c (Completed)     Other Visit Diagnoses       Elevated glucose        Relevant Orders    Comprehensive metabolic panel  (Completed)    Hemoglobin A1c (Completed)    Mixed hyperlipidemia        Relevant Medications    atorvastatin (LIPITOR) 10 MG tablet    Other Relevant Orders    Comprehensive metabolic panel (Completed)    Hemoglobin A1c (Completed)          Diagnoses         Codes Comments    Primary hypertension    -  Primary ICD-10-CM: I10  ICD-9-CM: 401.9     Elevated glucose     ICD-10-CM: R73.09  ICD-9-CM: 790.29     Mixed hyperlipidemia     ICD-10-CM: E78.2  ICD-9-CM: 272.2           Orders Placed This Encounter   Procedures    Comprehensive metabolic panel     Order Specific Question:   Release to patient     Answer:   Routine Release [[id number]]     Order Specific Question:   [facility] Has the patient fasted?     Answer:   Yes    Hemoglobin A1c     Order Specific Question:   Release to patient     Answer:   Routine Release [[id number]]     Order Specific Question:   [facility] Has the patient fasted?     Answer:   Yes          Elevated A1c   His A1c was previously 6.5, and he was started on Metformin. He reports no gastrointestinal issues with the medication. He has lost 18 pounds in the last 3 months through dietary changes, including cutting out sweets and focusing on protein and vegetables. He reports feeling better and more energetic. He is advised to continue dietary modifications, reduce sugar and alcohol intake, and incorporate more fiber, protein, and vegetables into his diet. Regular exercise, including walking and yoga, is recommended. He will continue taking Metformin 500 mg in the morning and 500 mg at night. An eye exam is recommended this year. His kidney function will be rechecked today.    2. Hyperlipidemia.  He is currently on Atorvastatin 10 mg daily and reports no muscle pain or cramping. He is advised to report any dark urine, muscle pain, or cramping. Cholesterol levels will be rechecked in 6 months.    3. Hypertension.  He is taking Lisinopril 40 mg once a day with no reported side effects. He will  continue with the current dosage.    4. Health Maintenance.  He is advised to maintain good hand hygiene by washing hands with soap and water for 20 seconds several times throughout the day and avoid contact with sick individuals. A balanced diet is recommended to support his immune system. He declined any vaccines today.              Education provided in AVS   Return in about 6 months (around 7/23/2025).    Patient or patient representative verbalized consent for the use of Ambient Listening during the visit with  JOHN Almaraz for chart documentation. 1/25/2025  09:38 EST   Answers submitted by the patient for this visit:  Primary Reason for Visit (Submitted on 1/16/2025)  What is the primary reason for your visit?: Diabetes

## 2025-01-24 LAB — HBA1C MFR BLD: 5.8 % (ref 4.8–5.6)

## 2025-03-01 NOTE — H&P (VIEW-ONLY)
History & Physical       Patient: Urbano Mares  YOB: 1970  Medical Record Number: [id number]  Body mass index is 33.91 kg/(m^2).    Surgeon:  Dr. Anthony Lujan    Chief Complaints:   Chief Complaint   Patient presents with   • Left Knee - Pre-op Exam       Subjective:    History of Present Illness: 47 y.o. male presents with   Chief Complaint   Patient presents with   • Left Knee - Pre-op Exam   . Onset of symptoms was years ago and has been progressively worsening despite more conservative treatment measures.  Symptoms are associated with ability to move, exercise, and perform activities of daily living.  Symptoms are aggravated by weight bearing and ROM necessary for activities of daily living.   Symptoms improve with rest, ice and elevation only minimally.      Allergies: No Known Allergies    Medications:   Home Medications:  Current Outpatient Prescriptions on File Prior to Visit   Medication Sig   • acetaminophen (TYLENOL) 500 MG tablet Take 500 mg by mouth Every 6 (Six) Hours As Needed for Mild Pain .   • CHLORHEXIDINE GLUCONATE CLOTH EX Apply  topically. AS DIRECTED:  PM DAY BEFORE SURGERY  AM DAY OF SURGERY   • ibuprofen (ADVIL,MOTRIN) 200 MG tablet Take 200 mg by mouth Every 6 (Six) Hours As Needed for Mild Pain . HOLD PRIOR TO SURGERY   • lisinopril (PRINIVIL,ZESTRIL) 10 MG tablet Take 2 tablets by mouth Daily.   • meloxicam (MOBIC) 15 MG tablet Take 15 mg by mouth Daily. HOLD PRIOR TO SURGERY   • mupirocin (BACTROBAN) 2 % nasal ointment into each nostril. AS DIRECTED:  AM & PM DAY BEFORE SURGERY  AM DAY OF SURGERY   • naproxen (NAPROSYN) 250 MG tablet Take 250 mg by mouth 2 (Two) Times a Day As Needed. HOLD PRIOR TO SURGERY     Current Facility-Administered Medications on File Prior to Visit   Medication   • [] mupirocin (BACTROBAN) 2 % nasal ointment     Current Medications:  Scheduled Meds:  Continuous Infusions:  No current facility-administered medications for this visit.    Problem: Pain  Goal: Acceptable pain level achieved/maintained at rest using appropriate pain scale for the patient  Outcome: Monitoring/Evaluating progress  Goal: Acceptable pain level achieved/maintained with activity using appropriate pain scale for the patient  Outcome: Monitoring/Evaluating progress  Goal: Acceptable pain level achieved/maintained without oversedation  Outcome: Monitoring/Evaluating progress     Problem: Diabetes  Goal: Achieves glycemic balance  Description: Goal is to maintain blood sugar within range with no episodes of hypoglycemia  Outcome: Monitoring/Evaluating progress  Goal: Verbalizes/demonstrates understanding of NEW diagnosis of diabetes and management  Description: Document on Patient Education Activity  Outcome: Monitoring/Evaluating progress  Goal: Verbalizes understanding of diabetes management including how to use HbA1C to evaluate status of blood sugar over time (Diabetes is NOT a new diagnosis)  Description: Diabetes Education  Outcome: Monitoring/Evaluating progress  Goal: Demonstrates ability to self-administer insulin  Description: Document on Patient Education Activity  Outcome: Monitoring/Evaluating progress        PRN Meds:.    I have reviewed the patient's medical history in detail and updated the computerized patient record.  Review and summarization of old records include:    Past Medical History:   Diagnosis Date   • Arthritis     OSTEO   • Hypertension    • Joint pain    • Left knee pain         Past Surgical History:   Procedure Laterality Date   • WISDOM TOOTH EXTRACTION          Social History     Occupational History   • Not on file.     Social History Main Topics   • Smoking status: Current Some Day Smoker     Types: Cigars   • Smokeless tobacco: Never Used      Comment: FEW YEARLY    • Alcohol use 3.6 oz/week     3 Shots of liquor, 3 Cans of beer per week   • Drug use: No   • Sexual activity: Defer    Social History     Social History Narrative        Family History   Problem Relation Age of Onset   • Cancer Mother    • Cancer Father      Prostate, Bladder, Brain   • Hypertension Sister    • Hypertension Brother    • Malig Hyperthermia Neg Hx        ROS: 14 point review of systems was performed and was negative except for documented findings in HPI and today's encounter.     Allergies: No Known Allergies  Constitutional:  Denies fever, shaking or chills   Eyes:  Denies change in visual acuity   HENT:  Denies nasal congestion or sore throat   Respiratory:  Denies cough or shortness of breath   Cardiovascular:  Denies chest pain or severe LE edema   GI:  Denies abdominal pain, nausea, vomiting, bloody stools or diarrhea   Musculoskeletal:  Denies numbness tingling or loss of motor function except as outlined above in history of present illness.  : Denies painful urination or hematuria  Integument:  Denies rash, lesion or ulceration   Neurologic:  Denies headache or focal weakness  Endocrine:  Denies lymphadenopathy  Psych:  Denies confusion or change in mental status   Hem:  Denies active bleeding    Physical Exam: 47 y.o. male  Body mass index is 33.91 kg/(m^2)., @HT@, @WT@  Vitals:    11/09/17 0748   Temp: 97.7  °F (36.5 °C)     Vital signs reviewed.   General Appearance:    Alert, cooperative, in no acute distress                  Eyes: conjunctiva clear  ENT: external ears and nose atraumatic  CV: no peripheral edema  Resp: normal respiratory effort  Skin: no rashes or wounds; normal turgor  Psych: mood and affect appropriate  Lymph: no nodes appreciated  Neuro: gross sensation intact  Vascular:  Palpable peripheral pulse in noted extremity  Musculoskeletal Extremities: DETAILED KNEE Exam: Left knee: Painful gait w/wo limp, muscle atrophy, erythema, ecchymosis, or gross deformity noted, Large knee effusion, + medial joint line tenderness, Active range of motion flexion contracture, 5/5 strength flexion and extension, The knee is stable to varus and valgus stress testing, VARUS VALGUS NEUTRAL: varus alignment of the limb, Lachman negative, Posterior drawer negative, Evy's negative, Patellofemoral grind +, Sensation grossly intact to light tough throughout the lower extremity, Skin is intact, Distal pulses are palpable, No signs or symptoms of DVT          Diagnostic Tests:  Appointment on 11/07/2017   Component Date Value Ref Range Status   • Glucose 11/07/2017 115* 65 - 99 mg/dL Final   • BUN 11/07/2017 21* 6 - 20 mg/dL Final   • Creatinine 11/07/2017 0.96  0.76 - 1.27 mg/dL Final   • Sodium 11/07/2017 140  136 - 145 mmol/L Final   • Potassium 11/07/2017 5.0  3.5 - 5.2 mmol/L Final   • Chloride 11/07/2017 102  98 - 107 mmol/L Final   • CO2 11/07/2017 24.9  22.0 - 29.0 mmol/L Final   • Calcium 11/07/2017 9.8  8.6 - 10.5 mg/dL Final   • eGFR Non African Amer 11/07/2017 83  >60 mL/min/1.73 Final   • BUN/Creatinine Ratio 11/07/2017 21.9  7.0 - 25.0 Final   • Anion Gap 11/07/2017 13.1  mmol/L Final   • WBC 11/07/2017 8.29  4.50 - 10.70 10*3/mm3 Final   • RBC 11/07/2017 4.76  4.60 - 6.00 10*6/mm3 Final   • Hemoglobin 11/07/2017 14.7  13.7 - 17.6 g/dL Final   • Hematocrit 11/07/2017 46.2  40.4 - 52.2 % Final   • MCV  11/07/2017 97.1* 79.8 - 96.2 fL Final   • MCH 11/07/2017 30.9  27.0 - 32.7 pg Final   • MCHC 11/07/2017 31.8* 32.6 - 36.4 g/dL Final   • RDW 11/07/2017 14.2  11.5 - 14.5 % Final   • RDW-SD 11/07/2017 50.7  37.0 - 54.0 fl Final   • MPV 11/07/2017 11.1  6.0 - 12.0 fL Final   • Platelets 11/07/2017 192  140 - 500 10*3/mm3 Final   • Color, UA 11/07/2017 Yellow  Yellow, Straw Final   • Appearance, UA 11/07/2017 Clear  Clear Final   • pH, UA 11/07/2017 <=5.0  5.0 - 8.0 Final   • Specific Gravity, UA 11/07/2017 1.020  1.005 - 1.030 Final   • Glucose, UA 11/07/2017 Negative  Negative Final   • Ketones, UA 11/07/2017 Negative  Negative Final   • Bilirubin, UA 11/07/2017 Negative  Negative Final   • Blood, UA 11/07/2017 Negative  Negative Final   • Protein, UA 11/07/2017 Negative  Negative Final   • Leuk Esterase, UA 11/07/2017 Negative  Negative Final   • Nitrite, UA 11/07/2017 Negative  Negative Final   • Urobilinogen, UA 11/07/2017 0.2 E.U./dL  0.2 - 1.0 E.U./dL Final     Xr Knee 3 View Bilateral    Result Date: 10/13/2017  Impression: Ordering physician's impression is located in the Encounter Note dated 10/13/17       Imaging was done previously in the office, images were personally viewed and discussed at length with the patient:    Indication: pain related symptoms,  Views: 3V AP, LAT & 40 degree PA bilateral knee(s)   Findings: severe end-stage arthritis (bone on bone, subchondral sclerosis/cysts, osteophytes)  Comparison views: viewed last xray done in the office.     Assessment:  Patient Active Problem List   Diagnosis   • Chronic pain of both knees   • Arthritis of left knee   • Arthritis of right knee       Plan:  Dr. Anthony Lujan reviewed anatomy of a total joint arthroplasty in laymen's terms, as well as typical postoperative recovery and possibly 6-12 months for maximal recovery, and possible need for rehabilitation stay after hospitalization. We also discussed risks, benefits, alternatives, and limitations  of procedure with risks including but not limited to neurovascular damage, bleeding, infection, malalignment, chronic pian, failure of implants, osteolysis, loosening of implants, loss of motion, weakness, stiffness, instability, DVT, pulmonary embolus, death, stroke, complex regional pain syndrome, myocardial infarction, and need for additional procedures. Concept of substitution vs. replacement discussed.  No guarantees were given regarding results of surgery.      Urbano Mares was given the opportunity to ask and have all questions answered today.  The patient voiced understanding of the risks, benefits, and alternative forms of treatment that were discussed and the patient consents to proceed with surgery.     Patient's blood clot history is negative.  Planned DVT prophylaxis for surgery:  Aspirin    Discharge Plan: POD 2-3 to home and home health    Patient was seen by JOHN Armstrong in the office today.    Date: 11/9/2017  JOHN Hinkle

## 2025-03-04 RX ORDER — ATORVASTATIN CALCIUM 10 MG/1
10 TABLET, FILM COATED ORAL NIGHTLY
Qty: 90 TABLET | Refills: 3 | Status: SHIPPED | OUTPATIENT
Start: 2025-03-04

## 2025-04-01 ENCOUNTER — OFFICE VISIT (OUTPATIENT)
Dept: FAMILY MEDICINE CLINIC | Facility: CLINIC | Age: 55
End: 2025-04-01
Payer: COMMERCIAL

## 2025-04-01 VITALS
BODY MASS INDEX: 32.1 KG/M2 | HEIGHT: 72 IN | DIASTOLIC BLOOD PRESSURE: 82 MMHG | RESPIRATION RATE: 18 BRPM | SYSTOLIC BLOOD PRESSURE: 122 MMHG | OXYGEN SATURATION: 100 % | HEART RATE: 53 BPM | WEIGHT: 237 LBS

## 2025-04-01 DIAGNOSIS — N45.1 EPIDIDYMITIS: Primary | ICD-10-CM

## 2025-04-01 LAB
BILIRUB BLD-MCNC: NEGATIVE MG/DL
CLARITY, POC: CLEAR
COLOR UR: YELLOW
GLUCOSE UR STRIP-MCNC: NEGATIVE MG/DL
KETONES UR QL: NEGATIVE
LEUKOCYTE EST, POC: NEGATIVE
NITRITE UR-MCNC: NEGATIVE MG/ML
PH UR: 6 [PH] (ref 5–8)
PROT UR STRIP-MCNC: NEGATIVE MG/DL
RBC # UR STRIP: NEGATIVE /UL
SP GR UR: 1.01 (ref 1–1.03)
UROBILINOGEN UR QL: NORMAL

## 2025-04-01 PROCEDURE — 81002 URINALYSIS NONAUTO W/O SCOPE: CPT | Performed by: NURSE PRACTITIONER

## 2025-04-01 PROCEDURE — 99214 OFFICE O/P EST MOD 30 MIN: CPT | Performed by: NURSE PRACTITIONER

## 2025-04-01 RX ORDER — DOXYCYCLINE HYCLATE 100 MG
100 TABLET ORAL 2 TIMES DAILY
Qty: 20 TABLET | Refills: 0 | Status: SHIPPED | OUTPATIENT
Start: 2025-04-01 | End: 2025-04-11

## 2025-04-01 NOTE — PROGRESS NOTES
Subjective   Urbano Mares is a 55 y.o. male.     Chief Complaint   Patient presents with    Testicle Pain     L testicle is sometimes sore but not painful x1 month        History of Present Illness     History of Present Illness  The patient presents for evaluation of left epididymitis.    He has been experiencing discomfort in his left testicle for approximately one month, which he suspects may be due to epididymitis. He reports no recent changes in sexual partners. He reports no observable swelling in the affected area. His urinary function appears normal, with no presence of blood in the urine, hesitancy, or pain during urination. He recalls a previous episode of epididymitis in the late 1990s, which necessitated an emergency room visit due to its severity. A few days prior, he discovered a tick on his right testicle during a self-examination, which was notably sensitive to touch.     The following portions of the patient's history were reviewed and updated as appropriate: allergies, current medications, past family history, past medical history, past social history, past surgical history and problem list.    Review of Systems   Constitutional:  Negative for chills, fatigue and fever.   Respiratory:  Negative for cough, chest tightness, shortness of breath and wheezing.    Cardiovascular:  Negative for chest pain, palpitations and leg swelling.   Genitourinary:  Positive for testicular pain (Left testicle). Negative for difficulty urinating, dysuria, flank pain, frequency, hematuria and scrotal swelling.   Neurological:  Negative for dizziness, weakness and headache.   Hematological: Negative.    Psychiatric/Behavioral:  Negative for agitation, behavioral problems and hallucinations.        Objective   Physical Exam  Vitals and nursing note reviewed.   Constitutional:       Appearance: He is well-developed.   HENT:      Head: Normocephalic and atraumatic.   Eyes:      Conjunctiva/sclera: Conjunctivae normal.       Pupils: Pupils are equal, round, and reactive to light.   Neck:      Thyroid: No thyromegaly.   Cardiovascular:      Rate and Rhythm: Normal rate and regular rhythm.      Pulses: Normal pulses.      Heart sounds: Normal heart sounds. No murmur heard.     No friction rub. No gallop.   Pulmonary:      Effort: Pulmonary effort is normal.      Breath sounds: Normal breath sounds.   Genitourinary:     Comments: Pt refused inspection  Musculoskeletal:      Cervical back: Normal range of motion and neck supple.   Lymphadenopathy:      Cervical: No cervical adenopathy.   Skin:     General: Skin is warm and dry.      Capillary Refill: Capillary refill takes 2 to 3 seconds.   Neurological:      Mental Status: He is alert and oriented to person, place, and time.      Cranial Nerves: No cranial nerve deficit.   Psychiatric:         Behavior: Behavior normal.         Thought Content: Thought content normal.         Judgment: Judgment normal.         Vitals:    04/01/25 0906   BP: 122/82   Pulse: 53   Resp: 18   SpO2: 100%     Body mass index is 32.14 kg/m².      Procedures    Assessment & Plan   Problems Addressed this Visit    None  Visit Diagnoses         Epididymitis    -  Primary    Relevant Medications    doxycycline (VIBRAMYICN) 100 MG tablet    Other Relevant Orders    POC Urinalysis Dipstick    Urine Culture - Urine, Urine, Clean Catch          Diagnoses         Codes Comments      Epididymitis    -  Primary ICD-10-CM: N45.1  ICD-9-CM: 604.90             Assessment & Plan  1. Epididymitis.  He reports soreness in the left testicle for about a month. There is no swelling, blood in the urine, hesitancy, or pain with urination. A prescription for doxycycline 100 mg, to be taken twice daily for 10 days, has been sent to [facility] on [address]. A urine test will be conducted today to rule out a urinary tract infection. If there is no improvement, he is advised to inform the clinic.            Return if symptoms worsen  or fail to improve.    Patient or patient representative verbalized consent for the use of Ambient Listening during the visit with  JOHN Uribe for chart documentation. 4/1/2025  09:20 EDT

## 2025-04-03 LAB
BACTERIA UR CULT: NO GROWTH
BACTERIA UR CULT: NORMAL

## 2025-07-30 ENCOUNTER — OFFICE VISIT (OUTPATIENT)
Dept: FAMILY MEDICINE CLINIC | Facility: CLINIC | Age: 55
End: 2025-07-30
Payer: COMMERCIAL

## 2025-07-30 VITALS
OXYGEN SATURATION: 99 % | WEIGHT: 238 LBS | HEIGHT: 72 IN | DIASTOLIC BLOOD PRESSURE: 88 MMHG | SYSTOLIC BLOOD PRESSURE: 120 MMHG | RESPIRATION RATE: 18 BRPM | HEART RATE: 64 BPM | BODY MASS INDEX: 32.23 KG/M2

## 2025-07-30 DIAGNOSIS — E11.9 TYPE 2 DIABETES MELLITUS TREATED WITHOUT INSULIN: ICD-10-CM

## 2025-07-30 DIAGNOSIS — Z12.11 COLON CANCER SCREENING: ICD-10-CM

## 2025-07-30 DIAGNOSIS — I10 ESSENTIAL HYPERTENSION: ICD-10-CM

## 2025-07-30 DIAGNOSIS — L23.7 POISON IVY DERMATITIS: Primary | ICD-10-CM

## 2025-07-30 RX ORDER — PREDNISONE 20 MG/1
TABLET ORAL
Qty: 22 TABLET | Refills: 1 | Status: SHIPPED | OUTPATIENT
Start: 2025-07-30

## 2025-07-30 NOTE — PROGRESS NOTES
Subjective   Urbano Mares is a 55 y.o. male.     History of Present Illness     The following portions of the patient's history were reviewed and updated as appropriate: allergies, current medications, past family history, past medical history, past social history, past surgical history and problem list.       The patient is a 55-year-old male who presents for a hypertension follow-up and an acute poison ivy outbreak.    Acute rash x 6 days. He has been managing his current poison ivy outbreak with a combination of steroid taper and injections, which he finds effective. The rash is present on his forearms, upper arms, groin, and right upper thigh. The outbreak has persisted for 6 days. He reports working at the [employer] recently, which led to the exposure. He has previously used prednisone for severe outbreaks but does not believe he needs both treatments this time. He expresses a desire to be free of poison ivy by his anniversary on 08/19/2025.    Chronic hypertension x years. He reports no side effects from the lisinopril 40 mg, such as coughing. He is currently taking lisinopril 40 mg for his hypertension.    He maintains a healthy diet and consumes near beer with a 2.6% alcohol content. He has declined the COVID-19 vaccine, influenza vaccine, and pneumonia vaccine. He has never undergone a colonoscopy and reports no changes in bowel habits, such as constipation, diarrhea, blood in stool, or changes in stool appearance. He is on metformin for diabetes and reports improved blood sugar levels, with a significant reduction from 6.5% in October to 5.8% in January.    Diet: He maintains a healthy diet.  Alcohol: He consumes near beer with a 2.6% alcohol content.    FAMILY HISTORY  He does not have a family history of colon cancer.         Review of Systems        Current Outpatient Medications:     atorvastatin (LIPITOR) 10 MG tablet, Take 1 tablet by mouth Every Night., Disp: 90 tablet, Rfl: 3    metFORMIN  (GLUCOPHAGE) 500 MG tablet, Take 1 tablet by mouth 2 (Two) Times a Day With Meals., Disp: 180 tablet, Rfl: 3    predniSONE (DELTASONE) 20 MG tablet, 3 tabs daily for 4 days, then 2 tabs daily for 4 days then half tab for four days, Disp: 22 tablet, Rfl: 1    lisinopril (PRINIVIL,ZESTRIL) 40 MG tablet, Take 1 tablet by mouth Daily for 90 days., Disp: 90 tablet, Rfl: 3    Objective   Physical Exam  Vitals reviewed.   Constitutional:       Appearance: Normal appearance. He is obese.   HENT:      Mouth/Throat:      Mouth: Mucous membranes are moist.   Cardiovascular:      Rate and Rhythm: Normal rate and regular rhythm.      Pulses: Normal pulses.      Heart sounds: Normal heart sounds.   Pulmonary:      Effort: Pulmonary effort is normal.   Musculoskeletal:         General: Normal range of motion.   Skin:     Findings: Rash present.          Neurological:      General: No focal deficit present.      Mental Status: He is alert.         Vitals:    07/30/25 0756   BP: 120/88   Pulse: 64   Resp: 18   SpO2: 99%     Body mass index is 32.28 kg/m².    Procedures    TSH   Date Value Ref Range Status   10/17/2023 1.150 0.450 - 4.500 uIU/mL Final     Hemoglobin A1C   Date Value Ref Range Status   01/23/2025 5.80 (H) 4.80 - 5.60 % Final     Comment:     Hemoglobin A1C Ranges:  Increased Risk for Diabetes  5.7% to 6.4%  Diabetes                     >= 6.5%  Diabetic Goal                < 7.0%            Assessment & Plan   Problems Addressed this Visit    None  Visit Diagnoses         Poison ivy dermatitis    -  Primary    Relevant Medications    predniSONE (DELTASONE) 20 MG tablet      Essential hypertension          Colon cancer screening        Relevant Medications    predniSONE (DELTASONE) 20 MG tablet    Other Relevant Orders    Cologuard - Stool, Per Rectum      Type 2 diabetes mellitus treated without insulin              Diagnoses         Codes Comments      Poison ivy dermatitis    -  Primary ICD-10-CM: L23.7  ICD-9-CM:  692.6       Essential hypertension     ICD-10-CM: I10  ICD-9-CM: 401.9       Colon cancer screening     ICD-10-CM: Z12.11  ICD-9-CM: V76.51       Type 2 diabetes mellitus treated without insulin     ICD-10-CM: E11.9  ICD-9-CM: 250.00                1. Acute poison ivy outbreak.  - Acute poison ivy outbreak affecting forearms, upper arms, groin, and right upper thigh for the past six days.  - Prescription for prednisone sent to pharmacy. Tapering schedule: 60 mg for 4 days, 40 mg for 4 days, and 20 mg for 4 days.  - Advised to wash hands frequently and avoid scratching the affected areas.  Hypertension.  - Currently on lisinopril 40 mg with no reported side effects such as coughing or angioedema.  - Advised to continue the current medication regimen.  - If unusual symptoms such as facial edema or persistent cough develop, he should stop taking lisinopril, take Benadryl, and notify the clinic.    2..Hypertension.  - Currently on lisinopril 40 mg with no reported side effects such as coughing or angioedema.  - Advised to continue the current medication regimen.  - If unusual symptoms such as facial edema or persistent cough develop, he should stop taking lisinopril, take Benadryl, and notify the clinic.      3. Type 2 diabetes mellitus.  - Blood sugar levels have improved significantly, with A1C decreasing from 6.5 in 10/2024 to 5.8 in 01/2025.  - Advised to continue the current diet and medication regimen, including metformin 500 bid.    4. Health maintenance.  - Kidney function satisfactory, liver enzyme levels normalized as of 01/2025.  - Declined COVID-19 vaccine, influenza vaccine, and pneumonia vaccine at this time.  - Cologuard test due in fall 2025; informed that a full colonoscopy is recommended at least once in his lifetime. Order for Cologuard test will be placed.  - Annual check-up scheduled after 10/2025, during which labs will be conducted.    Follow-up: Annual check-up after 10/2025.               Education provided in AVS   Return in about 3 months (around 10/30/2025) for Annual physical.    Patient or patient representative verbalized consent for the use of Ambient Listening during the visit with  JOHN Almaraz for chart documentation. 8/1/2025  08:07 EDT

## (undated) DEVICE — PREMIUM WET SKIN PREP TRAY: Brand: MEDLINE INDUSTRIES, INC.

## (undated) DEVICE — GLV SURG SENSICARE MICRO PF LF 8 STRL

## (undated) DEVICE — UNDERCAST PADDING: Brand: DEROYAL

## (undated) DEVICE — SUT VICRYL 1 CT1 27IN  JJ40G

## (undated) DEVICE — ENCORE® LATEX ORTHO SIZE 8.5, STERILE LATEX POWDER-FREE SURGICAL GLOVE: Brand: ENCORE

## (undated) DEVICE — COVER,MAYO STAND,STERILE: Brand: MEDLINE

## (undated) DEVICE — SYR LUERLOK 30CC

## (undated) DEVICE — BNDG ELAS CO-FLEX SLF ADHR 6IN 5YD LF STRL

## (undated) DEVICE — ENCORE® LATEX ORTHO SIZE 8, STERILE LATEX POWDER-FREE SURGICAL GLOVE: Brand: ENCORE

## (undated) DEVICE — PK KN TOTL 40

## (undated) DEVICE — NDL SPINE 18G 31/2IN PNK

## (undated) DEVICE — ENCORE® LATEX ORTHO SIZE 9, STERILE LATEX POWDER-FREE SURGICAL GLOVE: Brand: ENCORE

## (undated) DEVICE — DUAL CUT SAGITTAL BLADE

## (undated) DEVICE — STPLR SKIN VISISTAT WD 35CT

## (undated) DEVICE — GLV SURG TRIUMPH CLASSIC PF LTX 8 STRL

## (undated) DEVICE — BNDG ELAS ELITE V/CLOSE 6IN 5YD LF STRL

## (undated) DEVICE — APPL CHLORAPREP W/TINT 26ML ORNG

## (undated) DEVICE — GAUZE,SPONGE,FLUFF,6"X6.75",STRL,10/TRAY: Brand: MEDLINE

## (undated) DEVICE — P.F.C. DRILL BIT AND STEINMAN PIN PACKET (1 UNIT) .125IN DIA 5IN LGTH: Brand: P.F.C.

## (undated) DEVICE — MAT FLR ABSORBENT LG 4FT 10 2.5FT

## (undated) DEVICE — OCCLUSIVE GAUZE STRIP,3% BISMUTH TRIBROMOPHENATE IN PETROLATUM BLEND: Brand: XEROFORM

## (undated) DEVICE — GLV SURG TRIUMPH CLASSIC PF LTX 9 STRL

## (undated) DEVICE — INSTRUMENT BATTERY

## (undated) DEVICE — DRSNG PAD ABD 8X10IN STRL

## (undated) DEVICE — SPNG GZ WOVN 4X4IN 12PLY 10/BX STRL

## (undated) DEVICE — GLV SURG SENSICARE GREEN W/ALOE PF LF 9 STRL

## (undated) DEVICE — PAD,ABDOMINAL,8"X10",ST,LF: Brand: MEDLINE